# Patient Record
Sex: FEMALE | Race: WHITE | NOT HISPANIC OR LATINO | Employment: OTHER | URBAN - NONMETROPOLITAN AREA
[De-identification: names, ages, dates, MRNs, and addresses within clinical notes are randomized per-mention and may not be internally consistent; named-entity substitution may affect disease eponyms.]

---

## 2023-03-07 ENCOUNTER — TELEPHONE (OUTPATIENT)
Dept: PRIMARY CARE | Facility: CLINIC | Age: 68
End: 2023-03-07
Payer: MEDICAID

## 2023-03-31 ENCOUNTER — TELEPHONE (OUTPATIENT)
Dept: PRIMARY CARE | Facility: CLINIC | Age: 68
End: 2023-03-31
Payer: MEDICAID

## 2023-03-31 DIAGNOSIS — E66.9 DIABETES MELLITUS TYPE 2 IN OBESE: Primary | ICD-10-CM

## 2023-03-31 DIAGNOSIS — E11.69 DIABETES MELLITUS TYPE 2 IN OBESE: Primary | ICD-10-CM

## 2023-03-31 NOTE — TELEPHONE ENCOUNTER
Pt is asking if you could refer her to a Eye doctor so that she can get a DM eye  exam. Please advise, Thank you.

## 2023-04-18 ENCOUNTER — TELEPHONE (OUTPATIENT)
Dept: PRIMARY CARE | Facility: CLINIC | Age: 68
End: 2023-04-18
Payer: MEDICAID

## 2023-05-02 DIAGNOSIS — G62.89 OTHER POLYNEUROPATHY: Primary | ICD-10-CM

## 2023-05-02 PROBLEM — J30.2 SEASONAL ALLERGIES: Status: ACTIVE | Noted: 2023-05-02

## 2023-05-02 PROBLEM — K21.9 GERD (GASTROESOPHAGEAL REFLUX DISEASE): Status: ACTIVE | Noted: 2023-05-02

## 2023-05-02 PROBLEM — G62.9 PERIPHERAL NEUROPATHY: Status: ACTIVE | Noted: 2023-05-02

## 2023-05-02 PROBLEM — E78.5 HYPERLIPIDEMIA LDL GOAL <70: Status: ACTIVE | Noted: 2023-05-02

## 2023-05-02 PROBLEM — N39.3 STRESS INCONTINENCE IN FEMALE: Status: ACTIVE | Noted: 2023-05-02

## 2023-05-02 PROBLEM — G25.81 RESTLESS LEG: Status: ACTIVE | Noted: 2023-05-02

## 2023-05-02 PROBLEM — G47.00 INSOMNIA: Status: ACTIVE | Noted: 2023-05-02

## 2023-05-02 PROBLEM — E53.8 VITAMIN B12 DEFICIENCY: Status: ACTIVE | Noted: 2023-05-02

## 2023-05-02 PROBLEM — F01.50 VASCULAR DEMENTIA, UNCOMPLICATED (MULTI): Status: ACTIVE | Noted: 2023-05-02

## 2023-05-02 PROBLEM — E11.9 DIABETES MELLITUS, TYPE 2 (MULTI): Status: ACTIVE | Noted: 2023-05-02

## 2023-05-02 PROBLEM — M13.0 POLYARTHROPATHY: Status: ACTIVE | Noted: 2023-05-02

## 2023-05-02 PROBLEM — K22.81 ESOPHAGEAL POLYP: Status: ACTIVE | Noted: 2023-05-02

## 2023-05-02 PROBLEM — K58.9 IBS (IRRITABLE BOWEL SYNDROME): Status: ACTIVE | Noted: 2023-05-02

## 2023-05-02 PROBLEM — F32.0 DEPRESSION, MAJOR, SINGLE EPISODE, MILD (CMS-HCC): Status: ACTIVE | Noted: 2023-05-02

## 2023-05-02 PROBLEM — I25.10 CAD (CORONARY ARTERY DISEASE): Status: ACTIVE | Noted: 2023-05-02

## 2023-05-02 RX ORDER — GABAPENTIN 300 MG/1
300 CAPSULE ORAL 3 TIMES DAILY
Qty: 90 CAPSULE | Refills: 5 | Status: SHIPPED | OUTPATIENT
Start: 2023-05-02 | End: 2023-09-15 | Stop reason: SDUPTHER

## 2023-05-02 RX ORDER — GABAPENTIN 300 MG/1
300 CAPSULE ORAL 3 TIMES DAILY
COMMUNITY
Start: 2023-03-16 | End: 2023-05-02 | Stop reason: SDUPTHER

## 2023-06-05 ENCOUNTER — OFFICE VISIT (OUTPATIENT)
Dept: PRIMARY CARE | Facility: CLINIC | Age: 68
End: 2023-06-05
Payer: MEDICARE

## 2023-06-05 VITALS
BODY MASS INDEX: 35.82 KG/M2 | DIASTOLIC BLOOD PRESSURE: 102 MMHG | HEART RATE: 70 BPM | OXYGEN SATURATION: 98 % | HEIGHT: 65 IN | SYSTOLIC BLOOD PRESSURE: 182 MMHG | WEIGHT: 215 LBS

## 2023-06-05 DIAGNOSIS — E66.01 OBESITY, MORBID (MULTI): Primary | ICD-10-CM

## 2023-06-05 DIAGNOSIS — E11.9 TYPE 2 DIABETES MELLITUS WITHOUT COMPLICATION, WITHOUT LONG-TERM CURRENT USE OF INSULIN (MULTI): ICD-10-CM

## 2023-06-05 DIAGNOSIS — K58.0 IRRITABLE BOWEL SYNDROME WITH DIARRHEA: ICD-10-CM

## 2023-06-05 DIAGNOSIS — I10 PRIMARY HYPERTENSION: ICD-10-CM

## 2023-06-05 DIAGNOSIS — F01.50 VASCULAR DEMENTIA, UNCOMPLICATED (MULTI): ICD-10-CM

## 2023-06-05 DIAGNOSIS — E78.5 HYPERLIPIDEMIA LDL GOAL <70: ICD-10-CM

## 2023-06-05 DIAGNOSIS — R53.83 FATIGUE, UNSPECIFIED TYPE: ICD-10-CM

## 2023-06-05 DIAGNOSIS — F32.0 DEPRESSION, MAJOR, SINGLE EPISODE, MILD (CMS-HCC): ICD-10-CM

## 2023-06-05 PROBLEM — N32.81 OVERACTIVE BLADDER: Status: ACTIVE | Noted: 2022-02-16

## 2023-06-05 PROBLEM — Z85.3 HISTORY OF BREAST CANCER: Status: ACTIVE | Noted: 2022-02-16

## 2023-06-05 PROCEDURE — 1159F MED LIST DOCD IN RCRD: CPT | Performed by: INTERNAL MEDICINE

## 2023-06-05 PROCEDURE — 4010F ACE/ARB THERAPY RXD/TAKEN: CPT | Performed by: INTERNAL MEDICINE

## 2023-06-05 PROCEDURE — 99214 OFFICE O/P EST MOD 30 MIN: CPT | Performed by: INTERNAL MEDICINE

## 2023-06-05 PROCEDURE — 3080F DIAST BP >= 90 MM HG: CPT | Performed by: INTERNAL MEDICINE

## 2023-06-05 PROCEDURE — 3077F SYST BP >= 140 MM HG: CPT | Performed by: INTERNAL MEDICINE

## 2023-06-05 PROCEDURE — 3051F HG A1C>EQUAL 7.0%<8.0%: CPT | Performed by: INTERNAL MEDICINE

## 2023-06-05 RX ORDER — LANOLIN ALCOHOL/MO/W.PET/CERES
1000 CREAM (GRAM) TOPICAL
COMMUNITY
Start: 2022-02-16 | End: 2023-08-07

## 2023-06-05 RX ORDER — HYDROXYZINE PAMOATE 25 MG/1
25 CAPSULE ORAL 3 TIMES DAILY PRN
COMMUNITY
Start: 2022-02-15 | End: 2023-11-08 | Stop reason: SDUPTHER

## 2023-06-05 RX ORDER — FLUTICASONE PROPIONATE 50 MCG
2 SPRAY, SUSPENSION (ML) NASAL DAILY
COMMUNITY
Start: 2022-01-06

## 2023-06-05 RX ORDER — LOPERAMIDE HYDROCHLORIDE 2 MG/1
CAPSULE ORAL
COMMUNITY

## 2023-06-05 RX ORDER — ESOMEPRAZOLE MAGNESIUM 40 MG/1
40 CAPSULE, DELAYED RELEASE ORAL
COMMUNITY
Start: 2021-11-12 | End: 2023-09-15 | Stop reason: SDUPTHER

## 2023-06-05 RX ORDER — LOSARTAN POTASSIUM 50 MG/1
50 TABLET ORAL DAILY
Qty: 30 TABLET | Refills: 11 | Status: SHIPPED | OUTPATIENT
Start: 2023-06-05 | End: 2023-09-15 | Stop reason: SDUPTHER

## 2023-06-05 RX ORDER — ACETAMINOPHEN 500 MG
500 TABLET ORAL EVERY 6 HOURS PRN
COMMUNITY

## 2023-06-05 RX ORDER — TRAZODONE HYDROCHLORIDE 50 MG/1
50 TABLET ORAL NIGHTLY
COMMUNITY
Start: 2021-11-11 | End: 2023-08-07

## 2023-06-05 RX ORDER — NITROGLYCERIN 0.4 MG/1
0.4 TABLET SUBLINGUAL EVERY 5 MIN PRN
COMMUNITY
End: 2023-11-22 | Stop reason: SDUPTHER

## 2023-06-05 RX ORDER — ROSUVASTATIN CALCIUM 5 MG/1
10 TABLET, COATED ORAL DAILY
COMMUNITY
Start: 2020-09-11 | End: 2023-08-07

## 2023-06-05 RX ORDER — DESVENLAFAXINE 100 MG/1
100 TABLET, EXTENDED RELEASE ORAL DAILY
COMMUNITY
Start: 2022-04-20 | End: 2023-08-07

## 2023-06-05 RX ORDER — METFORMIN HYDROCHLORIDE 500 MG/1
2 TABLET ORAL 2 TIMES DAILY
COMMUNITY
Start: 2022-03-07 | End: 2023-06-05 | Stop reason: SINTOL

## 2023-06-05 RX ORDER — GLIPIZIDE 2.5 MG/1
2.5 TABLET, EXTENDED RELEASE ORAL DAILY
COMMUNITY
Start: 2023-02-20 | End: 2023-06-26

## 2023-06-05 RX ORDER — METOPROLOL TARTRATE 100 MG/1
100 TABLET ORAL 2 TIMES DAILY
Qty: 60 TABLET | Refills: 5 | Status: SHIPPED | OUTPATIENT
Start: 2023-06-05 | End: 2023-09-15 | Stop reason: SDUPTHER

## 2023-06-05 RX ORDER — NAPROXEN SODIUM 220 MG/1
81 TABLET, FILM COATED ORAL
COMMUNITY

## 2023-06-05 RX ORDER — BUPROPION HYDROCHLORIDE 300 MG/1
300 TABLET ORAL EVERY MORNING
COMMUNITY
Start: 2022-04-20 | End: 2023-08-07

## 2023-06-05 ASSESSMENT — ENCOUNTER SYMPTOMS
PALPITATIONS: 0
ABDOMINAL PAIN: 0
SHORTNESS OF BREATH: 0
ARTHRALGIAS: 0
CHEST TIGHTNESS: 0
NAUSEA: 0
BACK PAIN: 0
WHEEZING: 0
DIARRHEA: 0
VOMITING: 0
COUGH: 0
FATIGUE: 1
BLOOD IN STOOL: 0

## 2023-06-05 NOTE — PATIENT INSTRUCTIONS
Please continue taking your metoprolol twice daily and please also remember to bring all of your pill bottles in a bag so that we can reconcile your medication list  Your blood pressure may have been elevated today due to coming into the doctor's office so I would like for you to check your blood pressure at home with your personal monitor when you have the opportunity to sit and relax for 10 to 20 minutes.  Please do this several times before your next follow-up visit and please bring your monitor with you to the visit  I sent a prescription to your pharmacy for medication called losartan 50 mg to be taken once daily.  This is also a blood pressure medicine that you can take every morning with your first dose of metoprolol  I really suggest that you set up a pill minder where you put all the pills in boxes at the beginning of every week and that way you do not have to worry about whether you have taken or not  I am also asking that you go get lab work so that we can assess your diabetes, your kidney function, your cholesterol, and also take a look at your thyroid and blood count.  This lab work should be fasting and please try to get it done at your earliest convenience

## 2023-06-05 NOTE — ASSESSMENT & PLAN NOTE
-I have asked that she fill her prescription for her testing kit supplies and start checking her sugar twice daily before breakfast and supper so that when she comes back we can evaluate  -She is currently taking Glucotrol XL 2.5 mg daily

## 2023-06-05 NOTE — PROGRESS NOTES
Subjective   Patient ID: Silva Corrigan is a 67 y.o. female who presents for No chief complaint on file..  HPI  She is here today for evaluation and accompanied by her son Jamie.  When she arrived her blood pressure was quite a bit generous.  We spent some time discussing her medications and the importance of taking them as directed and on time.  She is very adamant that she has a system for which she remembers to take it every day.  We talked about simply monitoring to make sure everything is on track.  We talked about her diagnosis of vascular dementia and how sometimes it can lead to forgetting certain important things like taking medicines and so forth.  Her son is noticed several changes in the past several years.  We discussed her irritable bowel syndrome and last visit she had stopped the metformin and felt like it was helping with her symptoms.  She states that she has not picked up her diabetic supplies yet but she will start checking her sugars to see what is going on with the numbers.  We talked about doing some laboratory testing.  I am going to add a blood pressure medication to her regimen today and then her son is going to give her a personal blood pressure monitor to perform at home.  We will see her back to go over the numbers and also reevaluate her blood pressure and how everything is going.  I will summarize everything in a problem based format  Review of Systems   Constitutional:  Positive for fatigue.   Respiratory:  Negative for cough, chest tightness, shortness of breath and wheezing.    Cardiovascular:  Negative for chest pain, palpitations and leg swelling.   Gastrointestinal:  Negative for abdominal pain, blood in stool, diarrhea, nausea and vomiting.   Musculoskeletal:  Negative for arthralgias and back pain.     Objective   Physical Exam  Vitals and nursing note reviewed. Exam conducted with a chaperone present (Angers easily).   Constitutional:       General: She is not in acute  distress.     Appearance: Normal appearance.   HENT:      Head: Normocephalic and atraumatic.   Eyes:      Conjunctiva/sclera: Conjunctivae normal.   Cardiovascular:      Rate and Rhythm: Normal rate and regular rhythm.      Heart sounds: Normal heart sounds.   Pulmonary:      Effort: No respiratory distress.      Breath sounds: No wheezing.   Abdominal:      Palpations: Abdomen is soft.      Tenderness: There is no abdominal tenderness. There is no guarding.   Musculoskeletal:         General: No swelling. Normal range of motion.   Skin:     General: Skin is warm and dry.   Neurological:      General: No focal deficit present.      Mental Status: She is alert and oriented to person, place, and time.   Psychiatric:         Behavior: Behavior normal.         Assessment/Plan   Problem List Items Addressed This Visit          Nervous    Vascular dementia, uncomplicated (CMS/HCC)     -We discussed some of the issues with memory loss            Circulatory    Primary hypertension     -She reports taking her metoprolol tartrate 100 mg twice daily  -I am asking that she bring all of her pill bottles in a bag when she comes for her follow-up visit so we can make sure that what we have listed here is what she is taking at home  -I have requested that she let her son at least monitor her intake to make sure we are on track  -I am adding losartan 50 mg once daily to her blood pressure regimen and she will check her blood pressure at home with her personal monitor and bring it back with her         Relevant Medications    metoprolol tartrate (Lopressor) 100 mg tablet    losartan (Cozaar) 50 mg tablet       Digestive    IBS (irritable bowel syndrome)     -She discontinued the metformin which helped with her bowel symptoms            Endocrine/Metabolic    Diabetes mellitus, type 2 (CMS/HCC)     -I have asked that she fill her prescription for her testing kit supplies and start checking her sugar twice daily before breakfast and  supper so that when she comes back we can evaluate  -She is currently taking Glucotrol XL 2.5 mg daily         Obesity, morbid (CMS/HCC) - Primary       Other    Depression, major, single episode, mild (CMS/HCC)    Hyperlipidemia LDL goal <70     -We will be checking her cholesterol with her set of labs coming up in the next few weeks         Fatigue     -I will order a TSH and CBC as part of her evaluation for chronic fatigue               Alicia Mendoza, DO

## 2023-06-05 NOTE — ASSESSMENT & PLAN NOTE
-She reports taking her metoprolol tartrate 100 mg twice daily  -I am asking that she bring all of her pill bottles in a bag when she comes for her follow-up visit so we can make sure that what we have listed here is what she is taking at home  -I have requested that she let her son at least monitor her intake to make sure we are on track  -I am adding losartan 50 mg once daily to her blood pressure regimen and she will check her blood pressure at home with her personal monitor and bring it back with her

## 2023-06-09 ENCOUNTER — LAB (OUTPATIENT)
Dept: LAB | Facility: LAB | Age: 68
End: 2023-06-09
Payer: MEDICARE

## 2023-06-09 DIAGNOSIS — R53.83 FATIGUE, UNSPECIFIED TYPE: ICD-10-CM

## 2023-06-09 DIAGNOSIS — E11.9 TYPE 2 DIABETES MELLITUS WITHOUT COMPLICATION, WITHOUT LONG-TERM CURRENT USE OF INSULIN (MULTI): ICD-10-CM

## 2023-06-09 DIAGNOSIS — E78.5 HYPERLIPIDEMIA LDL GOAL <70: ICD-10-CM

## 2023-06-09 LAB
ANION GAP IN SER/PLAS: 14 MMOL/L (ref 10–20)
BASOPHILS (10*3/UL) IN BLOOD BY AUTOMATED COUNT: 0.07 X10E9/L (ref 0–0.1)
BASOPHILS/100 LEUKOCYTES IN BLOOD BY AUTOMATED COUNT: 0.9 % (ref 0–2)
CALCIUM (MG/DL) IN SER/PLAS: 9.6 MG/DL (ref 8.6–10.3)
CARBON DIOXIDE, TOTAL (MMOL/L) IN SER/PLAS: 27 MMOL/L (ref 21–32)
CHLORIDE (MMOL/L) IN SER/PLAS: 104 MMOL/L (ref 98–107)
CHOLESTEROL (MG/DL) IN SER/PLAS: 188 MG/DL (ref 0–199)
CHOLESTEROL IN HDL (MG/DL) IN SER/PLAS: 41 MG/DL
CHOLESTEROL/HDL RATIO: 4.6
CREATININE (MG/DL) IN SER/PLAS: 0.92 MG/DL (ref 0.5–1.05)
EOSINOPHILS (10*3/UL) IN BLOOD BY AUTOMATED COUNT: 0.1 X10E9/L (ref 0–0.7)
EOSINOPHILS/100 LEUKOCYTES IN BLOOD BY AUTOMATED COUNT: 1.3 % (ref 0–6)
ERYTHROCYTE DISTRIBUTION WIDTH (RATIO) BY AUTOMATED COUNT: 13.7 % (ref 11.5–14.5)
ERYTHROCYTE MEAN CORPUSCULAR HEMOGLOBIN CONCENTRATION (G/DL) BY AUTOMATED: 32 G/DL (ref 32–36)
ERYTHROCYTE MEAN CORPUSCULAR VOLUME (FL) BY AUTOMATED COUNT: 91 FL (ref 80–100)
ERYTHROCYTES (10*6/UL) IN BLOOD BY AUTOMATED COUNT: 5.02 X10E12/L (ref 4–5.2)
ESTIMATED AVERAGE GLUCOSE FOR HBA1C: 186 MG/DL
GFR FEMALE: 68 ML/MIN/1.73M2
GLUCOSE (MG/DL) IN SER/PLAS: 154 MG/DL (ref 74–99)
HEMATOCRIT (%) IN BLOOD BY AUTOMATED COUNT: 45.9 % (ref 36–46)
HEMOGLOBIN (G/DL) IN BLOOD: 14.7 G/DL (ref 12–16)
HEMOGLOBIN A1C/HEMOGLOBIN TOTAL IN BLOOD: 8.1 %
IMMATURE GRANULOCYTES/100 LEUKOCYTES IN BLOOD BY AUTOMATED COUNT: 0.3 % (ref 0–0.9)
LDL: 95 MG/DL (ref 0–99)
LEUKOCYTES (10*3/UL) IN BLOOD BY AUTOMATED COUNT: 7.8 X10E9/L (ref 4.4–11.3)
LYMPHOCYTES (10*3/UL) IN BLOOD BY AUTOMATED COUNT: 2.68 X10E9/L (ref 1.2–4.8)
LYMPHOCYTES/100 LEUKOCYTES IN BLOOD BY AUTOMATED COUNT: 34.2 % (ref 13–44)
MONOCYTES (10*3/UL) IN BLOOD BY AUTOMATED COUNT: 0.57 X10E9/L (ref 0.1–1)
MONOCYTES/100 LEUKOCYTES IN BLOOD BY AUTOMATED COUNT: 7.3 % (ref 2–10)
NEUTROPHILS (10*3/UL) IN BLOOD BY AUTOMATED COUNT: 4.4 X10E9/L (ref 1.2–7.7)
NEUTROPHILS/100 LEUKOCYTES IN BLOOD BY AUTOMATED COUNT: 56 % (ref 40–80)
NON HDL CHOLESTEROL: 147 MG/DL
PLATELETS (10*3/UL) IN BLOOD AUTOMATED COUNT: 285 X10E9/L (ref 150–450)
POTASSIUM (MMOL/L) IN SER/PLAS: 4.7 MMOL/L (ref 3.5–5.3)
SODIUM (MMOL/L) IN SER/PLAS: 140 MMOL/L (ref 136–145)
THYROTROPIN (MIU/L) IN SER/PLAS BY DETECTION LIMIT <= 0.05 MIU/L: 2.71 MIU/L (ref 0.44–3.98)
TRIGLYCERIDE (MG/DL) IN SER/PLAS: 261 MG/DL (ref 0–149)
UREA NITROGEN (MG/DL) IN SER/PLAS: 16 MG/DL (ref 6–23)
VLDL: 52 MG/DL (ref 0–40)

## 2023-06-09 PROCEDURE — 83036 HEMOGLOBIN GLYCOSYLATED A1C: CPT

## 2023-06-09 PROCEDURE — 80048 BASIC METABOLIC PNL TOTAL CA: CPT

## 2023-06-09 PROCEDURE — 85025 COMPLETE CBC W/AUTO DIFF WBC: CPT

## 2023-06-09 PROCEDURE — 80061 LIPID PANEL: CPT

## 2023-06-09 PROCEDURE — 36415 COLL VENOUS BLD VENIPUNCTURE: CPT

## 2023-06-09 PROCEDURE — 84443 ASSAY THYROID STIM HORMONE: CPT

## 2023-06-26 ENCOUNTER — OFFICE VISIT (OUTPATIENT)
Dept: PRIMARY CARE | Facility: CLINIC | Age: 68
End: 2023-06-26
Payer: MEDICARE

## 2023-06-26 VITALS
BODY MASS INDEX: 35.99 KG/M2 | WEIGHT: 216 LBS | DIASTOLIC BLOOD PRESSURE: 80 MMHG | SYSTOLIC BLOOD PRESSURE: 132 MMHG | HEART RATE: 77 BPM | HEIGHT: 65 IN

## 2023-06-26 DIAGNOSIS — E78.5 HYPERLIPIDEMIA LDL GOAL <70: ICD-10-CM

## 2023-06-26 DIAGNOSIS — L29.9 PRURITUS: ICD-10-CM

## 2023-06-26 DIAGNOSIS — E11.9 TYPE 2 DIABETES MELLITUS WITHOUT COMPLICATION, WITHOUT LONG-TERM CURRENT USE OF INSULIN (MULTI): Primary | ICD-10-CM

## 2023-06-26 PROCEDURE — 3075F SYST BP GE 130 - 139MM HG: CPT | Performed by: INTERNAL MEDICINE

## 2023-06-26 PROCEDURE — 4010F ACE/ARB THERAPY RXD/TAKEN: CPT | Performed by: INTERNAL MEDICINE

## 2023-06-26 PROCEDURE — 3079F DIAST BP 80-89 MM HG: CPT | Performed by: INTERNAL MEDICINE

## 2023-06-26 PROCEDURE — 99214 OFFICE O/P EST MOD 30 MIN: CPT | Performed by: INTERNAL MEDICINE

## 2023-06-26 PROCEDURE — 3052F HG A1C>EQUAL 8.0%<EQUAL 9.0%: CPT | Performed by: INTERNAL MEDICINE

## 2023-06-26 PROCEDURE — 1160F RVW MEDS BY RX/DR IN RCRD: CPT | Performed by: INTERNAL MEDICINE

## 2023-06-26 PROCEDURE — 1159F MED LIST DOCD IN RCRD: CPT | Performed by: INTERNAL MEDICINE

## 2023-06-26 RX ORDER — GLIPIZIDE 5 MG/1
5 TABLET, FILM COATED, EXTENDED RELEASE ORAL DAILY
Qty: 30 TABLET | Refills: 5 | Status: SHIPPED | OUTPATIENT
Start: 2023-06-26 | End: 2023-09-15 | Stop reason: SDUPTHER

## 2023-06-26 NOTE — ASSESSMENT & PLAN NOTE
-We discussed her hemoglobin A1c of 8.1  -We are increasing her glipizide XL from 2.5 mg up to 5 mg daily.  -She will monitor her sugars closely and give us an update and we will see her back in 3 months for reevaluation

## 2023-06-26 NOTE — PROGRESS NOTES
Subjective   Patient ID: Silva Corrigan is a 67 y.o. female who presents for No chief complaint on file..  HPI  She is here today for her 2-week follow-up.  She comes alone.  We went over the issues that we had identified at her last visit and we did specifically talk about her memory loss.  I had requested that she bring in all of her medications in a bag but she forgot.  We also discussed how keeping track of the medications is very important and either omission or may be double dosing can be dangerous.  Regardless her blood pressure looks okay today.  We also went over the results of all of her lab work.  Overall most of her numbers looked great.  Her kidney function is normal and her CBC was also completely normal.  We discussed her cholesterol and the only problem and really seeing her the high triglycerides.  Also unfortunately her blood sugars of also been higher than desirable as evidenced by hemoglobin A1c of 8.1.  This is up from 7 previously.  We discussed how high sugars and high triglycerides often go hand-in-hand so if we can lower the sugars usually the triglycerides also improved.  I am going to increase her glipizide from 2.5 mg up to 5 mg daily and I just asked that she watch sugars closely and give us an update.  We also discussed canceling her August appointment and then recalibrating a follow-up visit 3 months from now.  I will also dictate a list of tasks that we will want to accomplish just prior to her next follow-up visit.  Towards the end of her visit she had mentioned that she was having an itching scalp in place to the crown of her head.  I do not see any lesions per se but it does look like she has little bit of seborrhea so I recommended that she try over-the-counter Selsun Blue twice a week and to call if it is not successful in eradicating her symptoms.  As we were wrapping up she went on to state that she has been itching and she just started scratching.  She does not have a visible  rash.  We talked about how sometimes this can be a sign a dry skin or maybe even a reaction to certain personal hygienic products.  I will have her try a hypoallergenic regimen and if this is not successful in eradicating her itching she will get back with me.  Review of Systems  Objective   Physical Exam  Vitals and nursing note reviewed.   Constitutional:       General: She is not in acute distress.     Appearance: Normal appearance.   HENT:      Head: Normocephalic and atraumatic.   Eyes:      Conjunctiva/sclera: Conjunctivae normal.   Cardiovascular:      Rate and Rhythm: Normal rate and regular rhythm.      Heart sounds: Normal heart sounds.   Pulmonary:      Effort: No respiratory distress.      Breath sounds: No wheezing.   Abdominal:      Palpations: Abdomen is soft.      Tenderness: There is no abdominal tenderness. There is no guarding.   Musculoskeletal:         General: No swelling. Normal range of motion.   Skin:     General: Skin is warm and dry.   Neurological:      General: No focal deficit present.      Mental Status: She is alert and oriented to person, place, and time.   Psychiatric:         Behavior: Behavior normal.       Recent Results (from the past 504 hour(s))   Basic Metabolic Panel    Collection Time: 06/09/23 12:46 PM   Result Value Ref Range    Glucose 154 (H) 74 - 99 mg/dL    Sodium 140 136 - 145 mmol/L    Potassium 4.7 3.5 - 5.3 mmol/L    Chloride 104 98 - 107 mmol/L    Bicarbonate 27 21 - 32 mmol/L    Anion Gap 14 10 - 20 mmol/L    Urea Nitrogen 16 6 - 23 mg/dL    Creatinine 0.92 0.50 - 1.05 mg/dL    GFR Female 68 >90 mL/min/1.73m2    Calcium 9.6 8.6 - 10.3 mg/dL   Hemoglobin A1C    Collection Time: 06/09/23 12:46 PM   Result Value Ref Range    Hemoglobin A1C 8.1 (A) %    Estimated Average Glucose 186 MG/DL   TSH with reflex to Free T4 if abnormal    Collection Time: 06/09/23 12:46 PM   Result Value Ref Range    TSH 2.71 0.44 - 3.98 mIU/L   CBC and Auto Differential    Collection  Time: 06/09/23 12:46 PM   Result Value Ref Range    WBC 7.8 4.4 - 11.3 x10E9/L    RBC 5.02 4.00 - 5.20 x10E12/L    Hemoglobin 14.7 12.0 - 16.0 g/dL    Hematocrit 45.9 36.0 - 46.0 %    MCV 91 80 - 100 fL    MCHC 32.0 32.0 - 36.0 g/dL    Platelets 285 150 - 450 x10E9/L    RDW 13.7 11.5 - 14.5 %    Neutrophils % 56.0 40.0 - 80.0 %    Immature Granulocytes %, Automated 0.3 0.0 - 0.9 %    Lymphocytes % 34.2 13.0 - 44.0 %    Monocytes % 7.3 2.0 - 10.0 %    Eosinophils % 1.3 0.0 - 6.0 %    Basophils % 0.9 0.0 - 2.0 %    Neutrophils Absolute 4.40 1.20 - 7.70 x10E9/L    Lymphocytes Absolute 2.68 1.20 - 4.80 x10E9/L    Monocytes Absolute 0.57 0.10 - 1.00 x10E9/L    Eosinophils Absolute 0.10 0.00 - 0.70 x10E9/L    Basophils Absolute 0.07 0.00 - 0.10 x10E9/L   Lipid panel    Collection Time: 06/09/23 12:46 PM   Result Value Ref Range    Cholesterol 188 0 - 199 mg/dL    HDL 41.0 mg/dL    Cholesterol/HDL Ratio 4.6     LDL 95 0 - 99 mg/dL    VLDL 52 (H) 0 - 40 mg/dL    Triglycerides 261 (H) 0 - 149 mg/dL    Non HDL Cholesterol 147 mg/dL       Assessment/Plan   Problem List Items Addressed This Visit       Diabetes mellitus, type 2 (CMS/[facility]) - Primary     -We discussed her hemoglobin A1c of 8.1  -We are increasing her glipizide XL from 2.5 mg up to 5 mg daily.  -She will monitor her sugars closely and give us an update and we will see her back in 3 months for reevaluation         Relevant Medications    glipiZIDE XL (Glucotrol XL) 5 mg 24 hr tablet    Hyperlipidemia LDL goal <70     -We discussed her cholesterol and basically her triglycerides were high at 261.  We discussed how often times high sugars lead to high triglyceride levels and by lowering her sugar we should see improvement in the triglycerides.  I am making no changes in the dose of her cholesterol-lowering medication and she will continue taking rosuvastatin 5 mg daily         Pruritus     -I have outlined a hypoallergenic regimen including the use of Dove sensitive  soap for bathing  -Limiting bathing to shorter duration with lukewarm temperatures  -Dreft laundry detergent with double rinse cycle  -No fabric softeners of any kind  -Aquaphor twice daily for moisturizing                 Alicia S Royal, DO

## 2023-06-26 NOTE — ASSESSMENT & PLAN NOTE
-I have outlined a hypoallergenic regimen including the use of Dove sensitive soap for bathing  -Limiting bathing to shorter duration with lukewarm temperatures  -Dreft laundry detergent with double rinse cycle  -No fabric softeners of any kind  -Aquaphor twice daily for moisturizing

## 2023-06-26 NOTE — ASSESSMENT & PLAN NOTE
-We discussed her cholesterol and basically her triglycerides were high at 261.  We discussed how often times high sugars lead to high triglyceride levels and by lowering her sugar we should see improvement in the triglycerides.  I am making no changes in the dose of her cholesterol-lowering medication and she will continue taking rosuvastatin 5 mg daily

## 2023-06-26 NOTE — PATIENT INSTRUCTIONS
As we discussed your blood glucose levels have been higher than desirable as evidenced by hemoglobin A1c of 8.1.  This is up from a previous value of 7.0.  We decided to increase the dose of your glipizide from 2.5 mg up to 5 mg a day.  You can use up your 2.5 mg tablets by taking 2 at the very same time and then when you run out you will have a new prescription at the pharmacy for the 5 mg dose.  You will take that once a day.  Please check your sugars with this transition of medication and please call if you are having any difficulties.  I anticipate that by lowering your sugars your triglycerides will follow suit so we are not changing your cholesterol medication at this time.  We discussed the itchy scalp that you experience and I am recommending you try over-the-counter Selsun Blue twice a week and if this does not take care of it let us now.  We are going to cancel your August appointment and have you come back 3 months from now.  Please remember to get your lab work done just prior to that visit  I would still like for you to bring all of your pill bottles including over-the-counter medications in a bag for medication reconciliation  We discussed the itching that you have been experiencing and I do not see any evidence of a rash.  I do appreciate however that it looks like you have some dry skin which is very common as we age because her oil glands dry out.  I am going to asked that you do the following skin treatment and if your itching is not resolved to get back with us.  First:  Use Dove sensitive soap for bathing only it comes in a white box with pale green markings  Do not use any lotions as they can have a lot of perfume and ST alcohol in them which can have a drying effect.  For your dry skin I am going to recommend Aquaphor which you can find it almost any retail store and try to apply that twice a day to your skin.  Aquaphor is very neutral and does not have a lot of chemicals in it.  Use Dreft  laundry detergent for your clothing as a lot of people have allergies to laundry detergents.  Try to use to double rinse cycle in your washing machine so that all the soap is rinsed away and do not use any fabric softeners of any kind  Try to limit your bathing because often times hot long showers or baths can really dry out the skin.  You should try to only bathe a couple times a week and then just do a sponge bath in the hot spots  Please let us know if this is not effective in improving your itching  We will try to track down your last colorectal cancer screening test from [facility]

## 2023-07-20 ENCOUNTER — APPOINTMENT (OUTPATIENT)
Dept: PRIMARY CARE | Facility: CLINIC | Age: 68
End: 2023-07-20
Payer: MEDICAID

## 2023-07-28 DIAGNOSIS — F32.0 DEPRESSION, MAJOR, SINGLE EPISODE, MILD (CMS-HCC): ICD-10-CM

## 2023-07-28 DIAGNOSIS — E78.5 HYPERLIPIDEMIA LDL GOAL <70: ICD-10-CM

## 2023-07-28 DIAGNOSIS — G47.00 INSOMNIA, UNSPECIFIED TYPE: ICD-10-CM

## 2023-07-28 DIAGNOSIS — E53.8 VITAMIN B12 DEFICIENCY: ICD-10-CM

## 2023-07-31 ENCOUNTER — OFFICE VISIT (OUTPATIENT)
Dept: PRIMARY CARE | Facility: CLINIC | Age: 68
End: 2023-07-31
Payer: MEDICARE

## 2023-07-31 VITALS
HEIGHT: 65 IN | OXYGEN SATURATION: 98 % | BODY MASS INDEX: 36.49 KG/M2 | WEIGHT: 219 LBS | DIASTOLIC BLOOD PRESSURE: 80 MMHG | HEART RATE: 69 BPM | SYSTOLIC BLOOD PRESSURE: 126 MMHG

## 2023-07-31 DIAGNOSIS — R26.89 BALANCE DISORDER: Primary | ICD-10-CM

## 2023-07-31 PROCEDURE — 3074F SYST BP LT 130 MM HG: CPT | Performed by: INTERNAL MEDICINE

## 2023-07-31 PROCEDURE — 4010F ACE/ARB THERAPY RXD/TAKEN: CPT | Performed by: INTERNAL MEDICINE

## 2023-07-31 PROCEDURE — 99213 OFFICE O/P EST LOW 20 MIN: CPT | Performed by: INTERNAL MEDICINE

## 2023-07-31 PROCEDURE — 3079F DIAST BP 80-89 MM HG: CPT | Performed by: INTERNAL MEDICINE

## 2023-07-31 PROCEDURE — 1159F MED LIST DOCD IN RCRD: CPT | Performed by: INTERNAL MEDICINE

## 2023-07-31 PROCEDURE — 3052F HG A1C>EQUAL 8.0%<EQUAL 9.0%: CPT | Performed by: INTERNAL MEDICINE

## 2023-07-31 PROCEDURE — 1160F RVW MEDS BY RX/DR IN RCRD: CPT | Performed by: INTERNAL MEDICINE

## 2023-07-31 NOTE — PROGRESS NOTES
"Subjective   Patient ID: Silva Corrigan is a 67 y.o. female who presents for No chief complaint on file..  HPI  She made an appointment today to be seen because \"my son has really been on me recently about my falls \".  When asked how many times she has fallen in the last year she does not quite remember but she does state that she has had 2 falls whereby she luckily did not sustain any serious injuries.  She states she is concerned about her balance.  She states she cannot use a cane but she does have a walker in her possession.  I had her do an get up and go test and she was very slow to get up out of the seat that does not have armrests.  I also had her pace the room and she is very slowed and calculated with her walking.  Based on today's exam I do feel it would be worthwhile to try physical therapy to work on balance and strengthening and also to go over instruction about the use of a walker.  I told her to be very careful when she is moving about and hopefully the physical therapy sessions will improve her balance and reduce her risk of falling.  We also discussed possibly having her son come with her to her subsequent visits and she states that he does have Mondays off and could come with her then.  She states she is thinking about bringing him next time.  Review of Systems  Objective   Physical Exam  Vitals and nursing note reviewed.   Constitutional:       General: She is not in acute distress.     Appearance: Normal appearance.   HENT:      Head: Normocephalic and atraumatic.   Eyes:      Conjunctiva/sclera: Conjunctivae normal.   Cardiovascular:      Rate and Rhythm: Normal rate and regular rhythm.      Heart sounds: Normal heart sounds.   Pulmonary:      Effort: No respiratory distress.      Breath sounds: No wheezing.   Abdominal:      Palpations: Abdomen is soft.      Tenderness: There is no abdominal tenderness. There is no guarding.   Musculoskeletal:         General: No swelling. Normal range of " motion.   Skin:     General: Skin is warm and dry.   Neurological:      General: No focal deficit present.      Mental Status: She is alert and oriented to person, place, and time.   Psychiatric:         Behavior: Behavior normal.       Assessment/Plan   Problem List Items Addressed This Visit       Balance disorder - Primary     -We will send her to physical therapy for evaluation of her balance to work on strengthening and gait with a fall reduction  -I have recommended she use a walker at all times until we get things straightened out         Relevant Orders    Referral to Physical Therapy          Alicia Mendoza DO  Patient was identified as a fall risk. Risk prevention instructions provided.Patient was identified as a fall risk. Risk prevention instructions provided.

## 2023-07-31 NOTE — PATIENT INSTRUCTIONS
As we discussed we are going to refer you to physical therapy to help work on your balance and reduce your risk of falls  I would like for you to use your walker at all times to prevent fall and injury  For your future follow-up visits it would be nice to have your son accompany you      Ways to Help Prevent Falls at Home    Quick Tips   ? Ask for help if you need it. Most people want to help!   ? Get up slowly after sitting or laying down   ? Wear a medical alert device or keep cell phone in your pocket   ? Use night lights, especially areas near a bathroom   ? Keep the items you use often within reach on a small stool or end table   ? Use an assistive device such as walker or cane, as directed by provider/physical therapy   ? Use a non-slip mat and grab bars in your bathroom. Look for home health sections for best options     Other Areas to Focus On   ? Exercise and nutrition: Regular exercise or taking a falls prevention class are great ways improve strength and balance. Don’t forget to stay hydrated and bring a snack!   ? Medicine side effects: Some medicines can make you sleepy or dizzy, which could cause a fall. Ask your healthcare provider about the side effects your medicines could cause. Be sure to let them know if you take any vitamins or supplements as well.   ? Tripping hazards: Remove items you could trip on, such as loose mats, rugs, cords, and clutter. Wear closed toe shoes with rubber soles.   ? Health and wellness: Get regular checkups with your healthcare provider, plus routine vision and hearing screenings. Talk with your healthcare provider about:   o Your medicines and the possible side effects - bring them in a bag if that is easier!   o Problems with balance or feeling dizzy   o Ways to promote bone health, such as Vitamin D and calcium supplements   o Questions or concerns about falling     *Ask your healthcare team if you have questions     [facility], 2022         Ways to Help  Prevent Falls at Home    Quick Tips   ? Ask for help if you need it. Most people want to help!   ? Get up slowly after sitting or laying down   ? Wear a medical alert device or keep cell phone in your pocket   ? Use night lights, especially areas near a bathroom   ? Keep the items you use often within reach on a small stool or end table   ? Use an assistive device such as walker or cane, as directed by provider/physical therapy   ? Use a non-slip mat and grab bars in your bathroom. Look for home health sections for best options     Other Areas to Focus On   ? Exercise and nutrition: Regular exercise or taking a falls prevention class are great ways improve strength and balance. Don’t forget to stay hydrated and bring a snack!   ? Medicine side effects: Some medicines can make you sleepy or dizzy, which could cause a fall. Ask your healthcare provider about the side effects your medicines could cause. Be sure to let them know if you take any vitamins or supplements as well.   ? Tripping hazards: Remove items you could trip on, such as loose mats, rugs, cords, and clutter. Wear closed toe shoes with rubber soles.   ? Health and wellness: Get regular checkups with your healthcare provider, plus routine vision and hearing screenings. Talk with your healthcare provider about:   o Your medicines and the possible side effects - bring them in a bag if that is easier!   o Problems with balance or feeling dizzy   o Ways to promote bone health, such as Vitamin D and calcium supplements   o Questions or concerns about falling     *Ask your healthcare team if you have questions     ©[facility], 2022

## 2023-07-31 NOTE — ASSESSMENT & PLAN NOTE
-We will send her to physical therapy for evaluation of her balance to work on strengthening and gait with a fall reduction  -I have recommended she use a walker at all times until we get things straightened out

## 2023-08-07 RX ORDER — TRAZODONE HYDROCHLORIDE 50 MG/1
50 TABLET ORAL NIGHTLY
Qty: 90 TABLET | Refills: 1 | Status: SHIPPED | OUTPATIENT
Start: 2023-08-07

## 2023-08-07 RX ORDER — ROSUVASTATIN CALCIUM 5 MG/1
5 TABLET, COATED ORAL DAILY
Qty: 90 TABLET | Refills: 1 | Status: SHIPPED | OUTPATIENT
Start: 2023-08-07

## 2023-08-07 RX ORDER — DESVENLAFAXINE 100 MG/1
100 TABLET, EXTENDED RELEASE ORAL DAILY
Qty: 90 TABLET | Refills: 0 | Status: SHIPPED | OUTPATIENT
Start: 2023-08-07 | End: 2023-11-22 | Stop reason: SDUPTHER

## 2023-08-07 RX ORDER — LANOLIN ALCOHOL/MO/W.PET/CERES
1000 CREAM (GRAM) TOPICAL DAILY
Qty: 90 TABLET | Refills: 1 | Status: SHIPPED | OUTPATIENT
Start: 2023-08-07

## 2023-08-07 RX ORDER — BUPROPION HYDROCHLORIDE 300 MG/1
300 TABLET ORAL DAILY
Qty: 90 TABLET | Refills: 1 | Status: SHIPPED | OUTPATIENT
Start: 2023-08-07 | End: 2024-02-14

## 2023-09-12 ENCOUNTER — TELEPHONE (OUTPATIENT)
Dept: PRIMARY CARE | Facility: CLINIC | Age: 68
End: 2023-09-12
Payer: MEDICAID

## 2023-09-12 DIAGNOSIS — R30.0 DYSURIA: Primary | ICD-10-CM

## 2023-09-12 DIAGNOSIS — R32 URINARY INCONTINENCE, UNSPECIFIED TYPE: ICD-10-CM

## 2023-09-12 NOTE — TELEPHONE ENCOUNTER
Please schedule an ultrasound of the bladder  I called her because we had not discussed the symptoms at her prior appointment.  She states is only been getting worse in recent times.  She states she is pretty sure she does not have a urinary tract infection but we did discuss checking a urinalysis and a urine culture.  Because of the incontinence I am also ordering a urinary bladder.  I told her I would call her right away if we saw evidence of a urinary tract infection but otherwise she is fine waiting until her follow-up appointment later this month.

## 2023-09-12 NOTE — TELEPHONE ENCOUNTER
Pt called in requesting a medication for incontinence, states that it is getting worse and she is becoming allergic to the pads she has to wear everyday. Please advise, thank you.     [facility] pharmacy.

## 2023-09-15 DIAGNOSIS — K21.9 GASTROESOPHAGEAL REFLUX DISEASE WITHOUT ESOPHAGITIS: Primary | ICD-10-CM

## 2023-09-15 DIAGNOSIS — G62.89 OTHER POLYNEUROPATHY: ICD-10-CM

## 2023-09-15 DIAGNOSIS — I10 PRIMARY HYPERTENSION: ICD-10-CM

## 2023-09-15 DIAGNOSIS — E11.9 TYPE 2 DIABETES MELLITUS WITHOUT COMPLICATION, WITHOUT LONG-TERM CURRENT USE OF INSULIN (MULTI): ICD-10-CM

## 2023-09-15 RX ORDER — GLIPIZIDE 5 MG/1
5 TABLET, FILM COATED, EXTENDED RELEASE ORAL DAILY
Qty: 30 TABLET | Refills: 5 | Status: SHIPPED
Start: 2023-09-15 | End: 2023-09-22

## 2023-09-15 RX ORDER — LOSARTAN POTASSIUM 50 MG/1
50 TABLET ORAL DAILY
Qty: 30 TABLET | Refills: 11 | Status: SHIPPED | OUTPATIENT
Start: 2023-09-15 | End: 2024-09-14

## 2023-09-15 RX ORDER — METOPROLOL TARTRATE 100 MG/1
100 TABLET ORAL 2 TIMES DAILY
Qty: 60 TABLET | Refills: 5 | Status: SHIPPED | OUTPATIENT
Start: 2023-09-15 | End: 2024-03-13

## 2023-09-15 RX ORDER — GABAPENTIN 300 MG/1
300 CAPSULE ORAL 3 TIMES DAILY
Qty: 90 CAPSULE | Refills: 5 | Status: SHIPPED | OUTPATIENT
Start: 2023-09-15

## 2023-09-15 RX ORDER — ESOMEPRAZOLE MAGNESIUM 40 MG/1
40 CAPSULE, DELAYED RELEASE ORAL
Qty: 90 CAPSULE | Refills: 1 | Status: SHIPPED | OUTPATIENT
Start: 2023-09-15

## 2023-09-21 ENCOUNTER — OFFICE VISIT (OUTPATIENT)
Dept: PRIMARY CARE | Facility: CLINIC | Age: 68
End: 2023-09-21
Payer: MEDICARE

## 2023-09-21 ENCOUNTER — LAB (OUTPATIENT)
Dept: LAB | Facility: LAB | Age: 68
End: 2023-09-21
Payer: COMMERCIAL

## 2023-09-21 VITALS
HEIGHT: 65 IN | HEART RATE: 72 BPM | DIASTOLIC BLOOD PRESSURE: 82 MMHG | BODY MASS INDEX: 36.49 KG/M2 | SYSTOLIC BLOOD PRESSURE: 130 MMHG | WEIGHT: 219 LBS

## 2023-09-21 DIAGNOSIS — R26.89 BALANCE DISORDER: Primary | ICD-10-CM

## 2023-09-21 DIAGNOSIS — E11.9 TYPE 2 DIABETES MELLITUS WITHOUT COMPLICATION, WITHOUT LONG-TERM CURRENT USE OF INSULIN (MULTI): ICD-10-CM

## 2023-09-21 DIAGNOSIS — F01.50 VASCULAR DEMENTIA, UNCOMPLICATED (MULTI): ICD-10-CM

## 2023-09-21 DIAGNOSIS — E78.5 HYPERLIPIDEMIA LDL GOAL <70: ICD-10-CM

## 2023-09-21 DIAGNOSIS — Z00.00 HEALTH CARE MAINTENANCE: ICD-10-CM

## 2023-09-21 DIAGNOSIS — F32.0 DEPRESSION, MAJOR, SINGLE EPISODE, MILD (CMS-HCC): ICD-10-CM

## 2023-09-21 DIAGNOSIS — G47.09 OTHER INSOMNIA: ICD-10-CM

## 2023-09-21 PROBLEM — R30.0 DYSURIA: Status: RESOLVED | Noted: 2023-09-12 | Resolved: 2023-09-21

## 2023-09-21 PROBLEM — L29.9 PRURITUS: Status: RESOLVED | Noted: 2023-06-26 | Resolved: 2023-09-21

## 2023-09-21 PROBLEM — N39.3 STRESS INCONTINENCE IN FEMALE: Status: RESOLVED | Noted: 2023-05-02 | Resolved: 2023-09-21

## 2023-09-21 LAB
ESTIMATED AVERAGE GLUCOSE FOR HBA1C: 209 MG/DL
HEMOGLOBIN A1C/HEMOGLOBIN TOTAL IN BLOOD: 8.9 %

## 2023-09-21 PROCEDURE — 3075F SYST BP GE 130 - 139MM HG: CPT | Performed by: INTERNAL MEDICINE

## 2023-09-21 PROCEDURE — 4010F ACE/ARB THERAPY RXD/TAKEN: CPT | Performed by: INTERNAL MEDICINE

## 2023-09-21 PROCEDURE — 90471 IMMUNIZATION ADMIN: CPT | Performed by: INTERNAL MEDICINE

## 2023-09-21 PROCEDURE — 3079F DIAST BP 80-89 MM HG: CPT | Performed by: INTERNAL MEDICINE

## 2023-09-21 PROCEDURE — 99214 OFFICE O/P EST MOD 30 MIN: CPT | Performed by: INTERNAL MEDICINE

## 2023-09-21 PROCEDURE — 83036 HEMOGLOBIN GLYCOSYLATED A1C: CPT

## 2023-09-21 PROCEDURE — 1160F RVW MEDS BY RX/DR IN RCRD: CPT | Performed by: INTERNAL MEDICINE

## 2023-09-21 PROCEDURE — 1159F MED LIST DOCD IN RCRD: CPT | Performed by: INTERNAL MEDICINE

## 2023-09-21 PROCEDURE — 3052F HG A1C>EQUAL 8.0%<EQUAL 9.0%: CPT | Performed by: INTERNAL MEDICINE

## 2023-09-21 PROCEDURE — 90662 IIV NO PRSV INCREASED AG IM: CPT | Performed by: INTERNAL MEDICINE

## 2023-09-21 PROCEDURE — 36415 COLL VENOUS BLD VENIPUNCTURE: CPT

## 2023-09-21 ASSESSMENT — ENCOUNTER SYMPTOMS
ABDOMINAL PAIN: 0
PALPITATIONS: 0
BLOOD IN STOOL: 0
ARTHRALGIAS: 0
NAUSEA: 0
FATIGUE: 0
COUGH: 0
BACK PAIN: 0
WHEEZING: 0
SHORTNESS OF BREATH: 0
VOMITING: 0
DIARRHEA: 0

## 2023-09-21 NOTE — PATIENT INSTRUCTIONS
Today before leaving you will have your hemoglobin A1c drawn and once the results are known we will contact you.  If your hemoglobin A1c is high we will make adjustments in medications and see you back in mid December for follow-up.  We would like for you to check your sugars regularly as they have been high in the past  We are also giving you the season's flu vaccine for individuals over the age of 65  You are up-to-date with your mammogram and will be due again in February of next year  It sounds like you had colorectal cancer screening by sending in the specimen a few days ago and please call us when you get your report card so that we can update your file  Please continue with physical therapy and please call if you are not doing well

## 2023-09-21 NOTE — PROGRESS NOTES
Subjective   Patient ID: Silva Corrigan is a 68 y.o. female who presents for 3 MONTH FOLLOW UP.  HPI  She is here today for follow-up and accompanied by her son.  We discussed how we do not like to have her family attend her appointments with her because of the issues with memory.  She states she has attended 1 session of physical therapy and will go back tomorrow.  I told her I was very pleased because we want to do everything within her power to keep her from falling in the future.  I told her to be sure and complete her full sessions and do everything that they ask her to do.  We even talked about possibly using a cane or walker if she is not able to maintain steadiness on her own.  We also had intended for her to get lab work done prior to this visit but she forgot.  We are going to check a hemoglobin A1c and I will contact her with results.  We will decide then when she should be seen back for follow-up.  Her last hemoglobin A1c was little high in the 8 range and she states she has not been checking her blood sugars.  We also talked about insomnia and her sleep schedule.  We discussed how she should try to get back on a more routine type sleeping pattern.  She also states she received a kit in the mail for colorectal cancer screening and she completed about 3 days ago and mailed it.  I told her that when she gets her results to let us know as we would like to update her medical chart.  We also are giving her the season's flu vaccine.  She also had her mammogram back in February and all was well.  We will continue with an annual checkup  Review of Systems   Constitutional:  Negative for fatigue.   Respiratory:  Negative for cough, shortness of breath and wheezing.    Cardiovascular:  Negative for chest pain, palpitations and leg swelling.   Gastrointestinal:  Negative for abdominal pain, blood in stool, diarrhea, nausea and vomiting.   Musculoskeletal:  Negative for arthralgias and back pain.     Objective    Physical Exam  Vitals and nursing note reviewed.   Constitutional:       General: She is not in acute distress.     Appearance: Normal appearance.   HENT:      Head: Normocephalic and atraumatic.   Eyes:      Conjunctiva/sclera: Conjunctivae normal.   Cardiovascular:      Rate and Rhythm: Normal rate and regular rhythm.      Heart sounds: Normal heart sounds.   Pulmonary:      Effort: No respiratory distress.      Breath sounds: No wheezing.   Abdominal:      Palpations: Abdomen is soft.      Tenderness: There is no abdominal tenderness. There is no guarding.   Musculoskeletal:         General: No swelling. Normal range of motion.   Skin:     General: Skin is warm and dry.   Neurological:      General: No focal deficit present.      Mental Status: She is alert and oriented to person, place, and time.   Psychiatric:         Behavior: Behavior normal.       Assessment/Plan   Problem List Items Addressed This Visit       Depression, major, single episode, mild (CMS/HCC)    Diabetes mellitus, type 2 (CMS/HCC)     -She will get a hemoglobin A1c drawn today before leaving and I have agreed to contact her with results  -If the hemoglobin A1c is unacceptably high we will make adjustments in medications and see her back in mid December  -If it is normal or within acceptable range we will see her back in 6 months  -She will continue taking glipizide XL 5 mg daily         Relevant Orders    Hemoglobin A1C    Hyperlipidemia LDL goal <70    Insomnia     -I have suggested that she try to get back on a more routine sleep schedule         Vascular dementia, uncomplicated (CMS/HCC)     -She will continue to come with her son or other family members to visits         Balance disorder - Primary     -She will continue with her sessions of physical therapy and call if they are not effective  -We discussed using an assistive device such as a cane or walker               Alicia Mendoza DO

## 2023-09-21 NOTE — ASSESSMENT & PLAN NOTE
-She will continue with her sessions of physical therapy and call if they are not effective  -We discussed using an assistive device such as a cane or walker

## 2023-09-21 NOTE — ASSESSMENT & PLAN NOTE
-She will get a hemoglobin A1c drawn today before leaving and I have agreed to contact her with results  -If the hemoglobin A1c is unacceptably high we will make adjustments in medications and see her back in mid December  -If it is normal or within acceptable range we will see her back in 6 months  -She will continue taking glipizide XL 5 mg daily

## 2023-09-22 DIAGNOSIS — E11.9 TYPE 2 DIABETES MELLITUS WITHOUT COMPLICATION, WITHOUT LONG-TERM CURRENT USE OF INSULIN (MULTI): Primary | ICD-10-CM

## 2023-09-22 RX ORDER — GLIPIZIDE 10 MG/1
10 TABLET, FILM COATED, EXTENDED RELEASE ORAL DAILY
Qty: 30 TABLET | Refills: 11 | Status: SHIPPED | OUTPATIENT
Start: 2023-09-22 | End: 2024-09-21

## 2023-09-22 NOTE — RESULT ENCOUNTER NOTE
Please call Gladys and let her know that her hemoglobin A1c came back high at 8.9 meaning that we need to make an adjustment in her medication.  We have down that she is on the glipizide XL 5 mg daily.  ( She does have some issues with memory) so please ask her to confirm that she is taking the current medication.  You might just have her grab the bottle and read it off.  Tell her that I would like to increase the dose to 10 mg daily and if she is okay with that I will send a new prescription.  Thank you

## 2023-09-29 ENCOUNTER — TELEPHONE (OUTPATIENT)
Dept: PRIMARY CARE | Facility: CLINIC | Age: 68
End: 2023-09-29
Payer: MEDICAID

## 2023-09-29 NOTE — TELEPHONE ENCOUNTER
She does have dementia but acute confusion could be a sign of a medical problem.  For example some people can have a urinary tract infection which can cause confusion.  I suppose since she went back to her baseline then she likely does not have 1 but please advise that if she gets like that again she would need to be evaluated in over the weekend it might include a visit to the emergency department.  Thank you

## 2023-09-29 NOTE — TELEPHONE ENCOUNTER
"Pt son abdi called in stating that yesterday his mom was hallucinating for quite sometime during the day, then just went back to \"normal\". Thinking people are in her attic, she said that she told him she could \"smell\" them up there. He just wanted you to be aware, I did offer appointment.    "

## 2023-10-05 ENCOUNTER — APPOINTMENT (OUTPATIENT)
Dept: PHYSICAL THERAPY | Facility: CLINIC | Age: 68
End: 2023-10-05
Payer: MEDICAID

## 2023-10-06 ENCOUNTER — APPOINTMENT (OUTPATIENT)
Dept: PHYSICAL THERAPY | Facility: CLINIC | Age: 68
End: 2023-10-06
Payer: MEDICARE

## 2023-11-06 DIAGNOSIS — F41.9 ANXIETY: ICD-10-CM

## 2023-11-08 ENCOUNTER — TELEPHONE (OUTPATIENT)
Dept: PRIMARY CARE | Facility: CLINIC | Age: 68
End: 2023-11-08
Payer: MEDICAID

## 2023-11-08 DIAGNOSIS — L29.9 PRURITUS: ICD-10-CM

## 2023-11-08 DIAGNOSIS — E11.9 TYPE 2 DIABETES MELLITUS WITHOUT COMPLICATION, WITHOUT LONG-TERM CURRENT USE OF INSULIN (MULTI): Primary | ICD-10-CM

## 2023-11-08 RX ORDER — HYDROXYZINE PAMOATE 25 MG/1
25 CAPSULE ORAL 3 TIMES DAILY PRN
Qty: 30 CAPSULE | Refills: 3 | Status: SHIPPED | OUTPATIENT
Start: 2023-11-08

## 2023-11-08 RX ORDER — LANCETS
EACH MISCELLANEOUS
Qty: 100 EACH | Refills: 11 | Status: SHIPPED | OUTPATIENT
Start: 2023-11-08

## 2023-11-08 RX ORDER — INSULIN PUMP SYRINGE, 3 ML
EACH MISCELLANEOUS
Qty: 1 EACH | Refills: 0 | Status: SHIPPED | OUTPATIENT
Start: 2023-11-08 | End: 2024-11-07

## 2023-11-08 RX ORDER — IBUPROFEN 200 MG
100 CAPSULE ORAL 2 TIMES DAILY
Qty: 100 STRIP | Refills: 11 | Status: SHIPPED | OUTPATIENT
Start: 2023-11-08

## 2023-11-08 NOTE — TELEPHONE ENCOUNTER
I resent them.  Unfortunately her pharmacy listed was [facility] so I went ahead and changed it.  Thank you I also called her to let her know that I have been sent in

## 2023-11-08 NOTE — TELEPHONE ENCOUNTER
Silva called because [facility] does not have her Rxs.  She said she has called here to get them sent in but it's not being done for some reason.  She needs:    Glipizide  Sugar Kit to check her blood sugar, including lancets, test strips  Hydroxyzine    I told her I wasn't sure what happened but I would send you a message myself to get this taken care of.

## 2023-11-10 RX ORDER — HYDROXYZINE PAMOATE 25 MG/1
25 CAPSULE ORAL 3 TIMES DAILY PRN
Qty: 90 CAPSULE | Refills: 0 | Status: SHIPPED | OUTPATIENT
Start: 2023-11-10

## 2023-11-16 ENCOUNTER — TELEPHONE (OUTPATIENT)
Dept: PRIMARY CARE | Facility: CLINIC | Age: 68
End: 2023-11-16
Payer: MEDICAID

## 2023-11-16 DIAGNOSIS — R30.0 DYSURIA: Primary | ICD-10-CM

## 2023-11-16 NOTE — TELEPHONE ENCOUNTER
I tried to call her this evening around 6 PM and I got no answer but I did get a voicemail.  I explained that I was calling to find out more details about her symptoms.  I did not leave any other information other than I was going to leave a message with you and that she could call and ask for you.  Please explained to her that with urinary incontinence there are all kinds of different causes.  We first need to rule out an infection and I would asked that she go and get a urinalysis and urine culture first.  I did go ahead and place the orders.  I would like to see her in follow-up so we can discuss her urinary incontinence in detail.  Thank you!

## 2023-11-16 NOTE — TELEPHONE ENCOUNTER
Patient called and left . Patient is requesting medication for incontinence. Stated she is wearing a pad 24/7 and it is irritating her skin. She stated that she pees when standing, sitting, etc. I did not speak with her at this time.

## 2023-11-22 ENCOUNTER — OFFICE VISIT (OUTPATIENT)
Dept: PRIMARY CARE | Facility: CLINIC | Age: 68
End: 2023-11-22
Payer: MEDICARE

## 2023-11-22 VITALS
SYSTOLIC BLOOD PRESSURE: 124 MMHG | BODY MASS INDEX: 36.44 KG/M2 | DIASTOLIC BLOOD PRESSURE: 78 MMHG | OXYGEN SATURATION: 94 % | HEART RATE: 86 BPM | WEIGHT: 219 LBS

## 2023-11-22 DIAGNOSIS — F01.50 VASCULAR DEMENTIA, UNCOMPLICATED (MULTI): ICD-10-CM

## 2023-11-22 DIAGNOSIS — R32 URINARY INCONTINENCE, UNSPECIFIED TYPE: Primary | ICD-10-CM

## 2023-11-22 DIAGNOSIS — F32.0 DEPRESSION, MAJOR, SINGLE EPISODE, MILD (CMS-HCC): ICD-10-CM

## 2023-11-22 DIAGNOSIS — I25.83 CORONARY ARTERY DISEASE DUE TO LIPID RICH PLAQUE: ICD-10-CM

## 2023-11-22 DIAGNOSIS — I25.10 CORONARY ARTERY DISEASE DUE TO LIPID RICH PLAQUE: ICD-10-CM

## 2023-11-22 PROCEDURE — 3052F HG A1C>EQUAL 8.0%<EQUAL 9.0%: CPT | Performed by: INTERNAL MEDICINE

## 2023-11-22 PROCEDURE — 99214 OFFICE O/P EST MOD 30 MIN: CPT | Performed by: INTERNAL MEDICINE

## 2023-11-22 PROCEDURE — 1160F RVW MEDS BY RX/DR IN RCRD: CPT | Performed by: INTERNAL MEDICINE

## 2023-11-22 PROCEDURE — 3078F DIAST BP <80 MM HG: CPT | Performed by: INTERNAL MEDICINE

## 2023-11-22 PROCEDURE — 1159F MED LIST DOCD IN RCRD: CPT | Performed by: INTERNAL MEDICINE

## 2023-11-22 PROCEDURE — 3074F SYST BP LT 130 MM HG: CPT | Performed by: INTERNAL MEDICINE

## 2023-11-22 PROCEDURE — 4010F ACE/ARB THERAPY RXD/TAKEN: CPT | Performed by: INTERNAL MEDICINE

## 2023-11-22 RX ORDER — NITROGLYCERIN 0.4 MG/1
0.4 TABLET SUBLINGUAL EVERY 5 MIN PRN
Qty: 30 TABLET | Refills: 1 | Status: SHIPPED | OUTPATIENT
Start: 2023-11-22

## 2023-11-22 RX ORDER — DESVENLAFAXINE 100 MG/1
100 TABLET, EXTENDED RELEASE ORAL DAILY
Qty: 90 TABLET | Refills: 1 | Status: SHIPPED | OUTPATIENT
Start: 2023-11-22

## 2023-11-22 ASSESSMENT — ENCOUNTER SYMPTOMS
WHEEZING: 0
SHORTNESS OF BREATH: 0
COUGH: 0
ARTHRALGIAS: 0
BACK PAIN: 0
FEVER: 0
DYSURIA: 0
DIARRHEA: 0
HEMATURIA: 0

## 2023-11-22 ASSESSMENT — PATIENT HEALTH QUESTIONNAIRE - PHQ9
1. LITTLE INTEREST OR PLEASURE IN DOING THINGS: NOT AT ALL
SUM OF ALL RESPONSES TO PHQ9 QUESTIONS 1 AND 2: 0
2. FEELING DOWN, DEPRESSED OR HOPELESS: NOT AT ALL

## 2023-11-22 NOTE — ASSESSMENT & PLAN NOTE
-She was diagnosed with vascular mention by a prior provider around 2018.  -I told them that it was perfectly fine to see a neurologist for evaluation and more or less a second opinion about her condition and possible treatments  -We will help facilitate a referral  -We talked about heart healthy lifestyle practices

## 2023-11-22 NOTE — PATIENT INSTRUCTIONS
As we discussed we will have you submit a urine specimen by using the clean-catch technique and once the results come back I will contact you.  The staff will be helping you to get an appointment with a neurologist to discuss your memory loss

## 2023-11-22 NOTE — PROGRESS NOTES
Subjective   Patient ID: Silva Corrigan is a 68 y.o. female who presents for UTI (Patient reports urinary incontinence. ).  UTI   Pertinent negatives include no hematuria.   She is here today for evaluation and accompanied by her son.  She has been having problems with urinary incontinence and would like to work towards a solution.  We talked about her symptoms.  She seems to have a combination of stress and urge incontinence and we discussed further evaluation.  She did have an ultrasound of her bladder which did not show a significant postvoid residual.  She denies ever seeing blood in her urine but she states she sometimes has a feeling of fullness and points to the lower portion of her pelvic region.  She does drink Coca-Cola and her son reports she probably goes through a couple of 2 L bottles every couple of days.  We talked about how the caffeinated beverage with caffeine can really cause a lot of bladder symptoms including bladder spasms and increased urgency and frequency.  I did challenge her to give up the soda to see if her urine symptoms would improve.  We talked about weaning the beverage because of caffeine withdrawal headache.  We also will be checking a clean-catch urinalysis to make sure there is no blood and also rule out any infection.  I told her that once these test results come back I will contact her and then we can decide to try medicine or even possibly see a specialist.  Her son also inquired about seeing a neurologist for her dementia.  Long ago she was diagnosed with vascular dementia and he is seeing some changes.  I told him there is nothing wrong with sitting down with a specialist just to see if there is other options or possibly treatments etc.  We talked about vascular dementia specifically and about how we want to control blood pressure well, keep cholesterol low, and practice heart healthy lifestyle practices including exercise and eating healthy.  We are providing some refills on  medications today.  Review of Systems   Constitutional:  Negative for fever.   Respiratory:  Negative for cough, shortness of breath and wheezing.    Gastrointestinal:  Negative for diarrhea.   Genitourinary:  Negative for dysuria and hematuria.   Musculoskeletal:  Negative for arthralgias and back pain.     Objective   Physical Exam  Vitals and nursing note reviewed.   Constitutional:       General: She is not in acute distress.     Appearance: Normal appearance.   HENT:      Head: Normocephalic and atraumatic.   Eyes:      Conjunctiva/sclera: Conjunctivae normal.   Cardiovascular:      Rate and Rhythm: Normal rate and regular rhythm.      Heart sounds: Normal heart sounds.   Pulmonary:      Effort: No respiratory distress.      Breath sounds: No wheezing.   Abdominal:      Palpations: Abdomen is soft.      Tenderness: There is no abdominal tenderness. There is no guarding.   Musculoskeletal:         General: No swelling. Normal range of motion.   Skin:     General: Skin is warm and dry.   Neurological:      General: No focal deficit present.      Mental Status: She is alert and oriented to person, place, and time.   Psychiatric:         Behavior: Behavior normal.       Assessment/Plan   Problem List Items Addressed This Visit             ICD-10-CM    CAD (coronary artery disease) I25.10    Relevant Medications    nitroglycerin (Nitrostat) 0.4 mg SL tablet    Depression, major, single episode, mild (CMS/HCC) F32.0     -Stable         Relevant Medications    desvenlafaxine 100 mg 24 hr tablet    Vascular dementia, uncomplicated (CMS/HCC) F01.50     -She was diagnosed with vascular mention by a prior provider around 2018.  -I told them that it was perfectly fine to see a neurologist for evaluation and more or less a second opinion about her condition and possible treatments  -We will help facilitate a referral  -We talked about heart healthy lifestyle practices         Relevant Orders    Referral to Neurology     Urinary incontinence - Primary R32     -Bladder ultrasound this past year did not show significant postvoid residual  -We discussed the role of caffeinated beverages and causing a lot of her urinary symptoms  -I have recommended she give up the caffeinated beverages and soda for the next 4 weeks to see if it has a positive impact on her urinary symptoms  -She will provide a clean-catch specimen today for checking blood and also checking for infection.  -I have agreed to contact her with the results  -We discussed possibly a trial of medication and we also talked about urogynecology         Relevant Orders    Urinalysis with Reflex Microscopic    Urine Culture          Alicia Mendoza DO

## 2023-12-12 ENCOUNTER — LAB (OUTPATIENT)
Dept: LAB | Facility: LAB | Age: 68
End: 2023-12-12
Payer: MEDICARE

## 2023-12-12 DIAGNOSIS — R32 URINARY INCONTINENCE, UNSPECIFIED TYPE: ICD-10-CM

## 2023-12-12 LAB
APPEARANCE UR: ABNORMAL
BACTERIA #/AREA URNS AUTO: ABNORMAL /HPF
BILIRUB UR STRIP.AUTO-MCNC: NEGATIVE MG/DL
COLOR UR: ABNORMAL
GLUCOSE UR STRIP.AUTO-MCNC: ABNORMAL MG/DL
KETONES UR STRIP.AUTO-MCNC: NEGATIVE MG/DL
LEUKOCYTE ESTERASE UR QL STRIP.AUTO: ABNORMAL
MUCOUS THREADS #/AREA URNS AUTO: ABNORMAL /LPF
NITRITE UR QL STRIP.AUTO: POSITIVE
PH UR STRIP.AUTO: 5 [PH]
PROT UR STRIP.AUTO-MCNC: ABNORMAL MG/DL
RBC # UR STRIP.AUTO: NEGATIVE /UL
RBC #/AREA URNS AUTO: ABNORMAL /HPF
SP GR UR STRIP.AUTO: 1.03
SQUAMOUS #/AREA URNS AUTO: ABNORMAL /HPF
UROBILINOGEN UR STRIP.AUTO-MCNC: <2 MG/DL
WBC #/AREA URNS AUTO: >50 /HPF
WBC CLUMPS #/AREA URNS AUTO: ABNORMAL /HPF

## 2023-12-12 PROCEDURE — 81001 URINALYSIS AUTO W/SCOPE: CPT

## 2023-12-14 DIAGNOSIS — N30.00 ACUTE CYSTITIS WITHOUT HEMATURIA: Primary | ICD-10-CM

## 2023-12-14 RX ORDER — NITROFURANTOIN 25; 75 MG/1; MG/1
100 CAPSULE ORAL 2 TIMES DAILY
Qty: 10 CAPSULE | Refills: 0 | Status: SHIPPED | OUTPATIENT
Start: 2023-12-14 | End: 2023-12-19

## 2023-12-14 NOTE — RESULT ENCOUNTER NOTE
I called her.  We had ordered a urinalysis a long time ago but she just recently ended up going to get the urinalysis.  She states she is having some slight discomfort with urination.  I told her I thought she had a urinary tract infection and we would treat her with Macrobid.  I sent to her pharmacy and asked that she give us a call after the completion of the antibiotic to let us know if she has any more symptoms.  She agreed

## 2024-02-13 DIAGNOSIS — F32.0 DEPRESSION, MAJOR, SINGLE EPISODE, MILD (CMS-HCC): ICD-10-CM

## 2024-02-14 RX ORDER — BUPROPION HYDROCHLORIDE 300 MG/1
300 TABLET ORAL DAILY
Qty: 90 TABLET | Refills: 1 | Status: SHIPPED | OUTPATIENT
Start: 2024-02-14

## 2024-02-25 ENCOUNTER — CLINICAL SUPPORT (OUTPATIENT)
Dept: CARDIOLOGY | Facility: CLINIC | Age: 69
End: 2024-02-25
Payer: MEDICARE

## 2024-02-25 ENCOUNTER — APPOINTMENT (OUTPATIENT)
Dept: RADIOLOGY | Facility: HOSPITAL | Age: 69
End: 2024-02-25
Payer: MEDICARE

## 2024-02-25 ENCOUNTER — HOSPITAL ENCOUNTER (OUTPATIENT)
Facility: HOSPITAL | Age: 69
Setting detail: OBSERVATION
LOS: 1 days | Discharge: NURSING, REHAB OR LONG-TERM CARE | End: 2024-02-27
Attending: EMERGENCY MEDICINE | Admitting: INTERNAL MEDICINE
Payer: MEDICARE

## 2024-02-25 DIAGNOSIS — Z74.09 IMPAIRED MOBILITY AND ADLS: ICD-10-CM

## 2024-02-25 DIAGNOSIS — R29.6 FREQUENT FALLS: Primary | ICD-10-CM

## 2024-02-25 DIAGNOSIS — S10.93XA CONTUSION OF NECK, INITIAL ENCOUNTER: ICD-10-CM

## 2024-02-25 DIAGNOSIS — S09.90XA HEAD INJURY, INITIAL ENCOUNTER: ICD-10-CM

## 2024-02-25 DIAGNOSIS — Z78.9 IMPAIRED MOBILITY AND ADLS: ICD-10-CM

## 2024-02-25 LAB
ALBUMIN SERPL BCP-MCNC: 3.6 G/DL (ref 3.4–5)
ALP SERPL-CCNC: 54 U/L (ref 33–136)
ALT SERPL W P-5'-P-CCNC: 13 U/L (ref 7–45)
ANION GAP SERPL CALC-SCNC: 12 MMOL/L (ref 10–20)
APPEARANCE UR: CLEAR
AST SERPL W P-5'-P-CCNC: 13 U/L (ref 9–39)
BACTERIA #/AREA URNS AUTO: ABNORMAL /HPF
BILIRUB SERPL-MCNC: 0.8 MG/DL (ref 0–1.2)
BILIRUB UR STRIP.AUTO-MCNC: NEGATIVE MG/DL
BUN SERPL-MCNC: 12 MG/DL (ref 6–23)
CALCIUM SERPL-MCNC: 8.8 MG/DL (ref 8.6–10.3)
CARDIAC TROPONIN I PNL SERPL HS: 3 NG/L (ref 0–13)
CHLORIDE SERPL-SCNC: 102 MMOL/L (ref 98–107)
CO2 SERPL-SCNC: 27 MMOL/L (ref 21–32)
COLOR UR: YELLOW
CREAT SERPL-MCNC: 0.66 MG/DL (ref 0.5–1.05)
EGFRCR SERPLBLD CKD-EPI 2021: >90 ML/MIN/1.73M*2
ERYTHROCYTE [DISTWIDTH] IN BLOOD BY AUTOMATED COUNT: 14.1 % (ref 11.5–14.5)
FLUAV RNA RESP QL NAA+PROBE: NOT DETECTED
FLUBV RNA RESP QL NAA+PROBE: NOT DETECTED
GLUCOSE SERPL-MCNC: 161 MG/DL (ref 74–99)
GLUCOSE UR STRIP.AUTO-MCNC: NEGATIVE MG/DL
HCT VFR BLD AUTO: 39.8 % (ref 36–46)
HGB BLD-MCNC: 13.1 G/DL (ref 12–16)
KETONES UR STRIP.AUTO-MCNC: NEGATIVE MG/DL
LEUKOCYTE ESTERASE UR QL STRIP.AUTO: ABNORMAL
MCH RBC QN AUTO: 28.5 PG (ref 26–34)
MCHC RBC AUTO-ENTMCNC: 32.9 G/DL (ref 32–36)
MCV RBC AUTO: 87 FL (ref 80–100)
NITRITE UR QL STRIP.AUTO: NEGATIVE
NRBC BLD-RTO: 0 /100 WBCS (ref 0–0)
PH UR STRIP.AUTO: 6 [PH]
PLATELET # BLD AUTO: 253 X10*3/UL (ref 150–450)
POTASSIUM SERPL-SCNC: 3.2 MMOL/L (ref 3.5–5.3)
PROT SERPL-MCNC: 6.3 G/DL (ref 6.4–8.2)
PROT UR STRIP.AUTO-MCNC: NEGATIVE MG/DL
RBC # BLD AUTO: 4.6 X10*6/UL (ref 4–5.2)
RBC # UR STRIP.AUTO: NEGATIVE /UL
RBC #/AREA URNS AUTO: ABNORMAL /HPF
SARS-COV-2 RNA RESP QL NAA+PROBE: NOT DETECTED
SODIUM SERPL-SCNC: 138 MMOL/L (ref 136–145)
SP GR UR STRIP.AUTO: 1.01
SQUAMOUS #/AREA URNS AUTO: ABNORMAL /HPF
UROBILINOGEN UR STRIP.AUTO-MCNC: 4 MG/DL
WBC # BLD AUTO: 10.9 X10*3/UL (ref 4.4–11.3)
WBC #/AREA URNS AUTO: ABNORMAL /HPF

## 2024-02-25 PROCEDURE — 1100000001 HC PRIVATE ROOM DAILY

## 2024-02-25 PROCEDURE — 72125 CT NECK SPINE W/O DYE: CPT

## 2024-02-25 PROCEDURE — 84484 ASSAY OF TROPONIN QUANT: CPT | Performed by: PHYSICIAN ASSISTANT

## 2024-02-25 PROCEDURE — 73560 X-RAY EXAM OF KNEE 1 OR 2: CPT | Mod: RIGHT SIDE | Performed by: RADIOLOGY

## 2024-02-25 PROCEDURE — 36415 COLL VENOUS BLD VENIPUNCTURE: CPT | Performed by: PHYSICIAN ASSISTANT

## 2024-02-25 PROCEDURE — 80053 COMPREHEN METABOLIC PANEL: CPT | Performed by: PHYSICIAN ASSISTANT

## 2024-02-25 PROCEDURE — 93005 ELECTROCARDIOGRAM TRACING: CPT

## 2024-02-25 PROCEDURE — 99222 1ST HOSP IP/OBS MODERATE 55: CPT | Performed by: INTERNAL MEDICINE

## 2024-02-25 PROCEDURE — 85027 COMPLETE CBC AUTOMATED: CPT | Performed by: PHYSICIAN ASSISTANT

## 2024-02-25 PROCEDURE — 72100 X-RAY EXAM L-S SPINE 2/3 VWS: CPT

## 2024-02-25 PROCEDURE — 87086 URINE CULTURE/COLONY COUNT: CPT | Mod: SAMLAB | Performed by: PHYSICIAN ASSISTANT

## 2024-02-25 PROCEDURE — 2500000001 HC RX 250 WO HCPCS SELF ADMINISTERED DRUGS (ALT 637 FOR MEDICARE OP): Performed by: STUDENT IN AN ORGANIZED HEALTH CARE EDUCATION/TRAINING PROGRAM

## 2024-02-25 PROCEDURE — 72170 X-RAY EXAM OF PELVIS: CPT | Mod: FOREIGN READ | Performed by: RADIOLOGY

## 2024-02-25 PROCEDURE — 96361 HYDRATE IV INFUSION ADD-ON: CPT | Mod: 59

## 2024-02-25 PROCEDURE — 72100 X-RAY EXAM L-S SPINE 2/3 VWS: CPT | Mod: FOREIGN READ | Performed by: RADIOLOGY

## 2024-02-25 PROCEDURE — 70450 CT HEAD/BRAIN W/O DYE: CPT | Performed by: RADIOLOGY

## 2024-02-25 PROCEDURE — 2500000004 HC RX 250 GENERAL PHARMACY W/ HCPCS (ALT 636 FOR OP/ED): Performed by: PHYSICIAN ASSISTANT

## 2024-02-25 PROCEDURE — 73560 X-RAY EXAM OF KNEE 1 OR 2: CPT | Mod: RT

## 2024-02-25 PROCEDURE — 72170 X-RAY EXAM OF PELVIS: CPT

## 2024-02-25 PROCEDURE — 99285 EMERGENCY DEPT VISIT HI MDM: CPT | Mod: 25

## 2024-02-25 PROCEDURE — 72125 CT NECK SPINE W/O DYE: CPT | Performed by: RADIOLOGY

## 2024-02-25 PROCEDURE — 81001 URINALYSIS AUTO W/SCOPE: CPT | Performed by: PHYSICIAN ASSISTANT

## 2024-02-25 PROCEDURE — 70450 CT HEAD/BRAIN W/O DYE: CPT

## 2024-02-25 PROCEDURE — 87636 SARSCOV2 & INF A&B AMP PRB: CPT | Performed by: PHYSICIAN ASSISTANT

## 2024-02-25 RX ORDER — ROSUVASTATIN CALCIUM 5 MG/1
5 TABLET, COATED ORAL DAILY
Status: DISCONTINUED | OUTPATIENT
Start: 2024-02-25 | End: 2024-02-25 | Stop reason: ALTCHOICE

## 2024-02-25 RX ORDER — SODIUM CHLORIDE 9 MG/ML
125 INJECTION, SOLUTION INTRAVENOUS CONTINUOUS
Status: DISCONTINUED | OUTPATIENT
Start: 2024-02-25 | End: 2024-02-25

## 2024-02-25 RX ORDER — NITROGLYCERIN 0.4 MG/1
0.4 TABLET SUBLINGUAL EVERY 5 MIN PRN
Status: DISCONTINUED | OUTPATIENT
Start: 2024-02-25 | End: 2024-02-27 | Stop reason: HOSPADM

## 2024-02-25 RX ORDER — GABAPENTIN 300 MG/1
300 CAPSULE ORAL 3 TIMES DAILY
Status: DISCONTINUED | OUTPATIENT
Start: 2024-02-25 | End: 2024-02-26 | Stop reason: ALTCHOICE

## 2024-02-25 RX ORDER — BUPROPION HYDROCHLORIDE 300 MG/1
300 TABLET ORAL DAILY
Status: DISCONTINUED | OUTPATIENT
Start: 2024-02-25 | End: 2024-02-27 | Stop reason: HOSPADM

## 2024-02-25 RX ORDER — INSULIN LISPRO 100 [IU]/ML
0-10 INJECTION, SOLUTION INTRAVENOUS; SUBCUTANEOUS
Status: DISCONTINUED | OUTPATIENT
Start: 2024-02-26 | End: 2024-02-27 | Stop reason: HOSPADM

## 2024-02-25 RX ORDER — NAPROXEN SODIUM 220 MG/1
81 TABLET, FILM COATED ORAL DAILY
Status: DISCONTINUED | OUTPATIENT
Start: 2024-02-25 | End: 2024-02-27 | Stop reason: HOSPADM

## 2024-02-25 RX ORDER — FENTANYL CITRATE 50 UG/ML
50 INJECTION, SOLUTION INTRAMUSCULAR; INTRAVENOUS ONCE
Status: DISCONTINUED | OUTPATIENT
Start: 2024-02-25 | End: 2024-02-27 | Stop reason: HOSPADM

## 2024-02-25 RX ORDER — PANTOPRAZOLE SODIUM 40 MG/1
40 TABLET, DELAYED RELEASE ORAL
Status: DISCONTINUED | OUTPATIENT
Start: 2024-02-26 | End: 2024-02-27 | Stop reason: HOSPADM

## 2024-02-25 RX ORDER — DESVENLAFAXINE 50 MG/1
100 TABLET, EXTENDED RELEASE ORAL DAILY
Status: DISCONTINUED | OUTPATIENT
Start: 2024-02-25 | End: 2024-02-27 | Stop reason: HOSPADM

## 2024-02-25 RX ORDER — DEXTROSE 50 % IN WATER (D50W) INTRAVENOUS SYRINGE
25
Status: DISCONTINUED | OUTPATIENT
Start: 2024-02-25 | End: 2024-02-27 | Stop reason: HOSPADM

## 2024-02-25 RX ORDER — HYDROXYZINE PAMOATE 25 MG/1
25 CAPSULE ORAL 3 TIMES DAILY PRN
Status: DISCONTINUED | OUTPATIENT
Start: 2024-02-25 | End: 2024-02-27 | Stop reason: HOSPADM

## 2024-02-25 RX ORDER — ONDANSETRON HYDROCHLORIDE 2 MG/ML
4 INJECTION, SOLUTION INTRAVENOUS ONCE
Status: DISCONTINUED | OUTPATIENT
Start: 2024-02-25 | End: 2024-02-27 | Stop reason: HOSPADM

## 2024-02-25 RX ORDER — LOSARTAN POTASSIUM 50 MG/1
50 TABLET ORAL DAILY
Status: DISCONTINUED | OUTPATIENT
Start: 2024-02-25 | End: 2024-02-27 | Stop reason: HOSPADM

## 2024-02-25 RX ORDER — METOPROLOL TARTRATE 100 MG/1
100 TABLET ORAL 2 TIMES DAILY
Status: DISCONTINUED | OUTPATIENT
Start: 2024-02-25 | End: 2024-02-27 | Stop reason: HOSPADM

## 2024-02-25 RX ORDER — DEXTROSE MONOHYDRATE 100 MG/ML
0.3 INJECTION, SOLUTION INTRAVENOUS ONCE AS NEEDED
Status: DISCONTINUED | OUTPATIENT
Start: 2024-02-25 | End: 2024-02-27 | Stop reason: HOSPADM

## 2024-02-25 RX ORDER — ATORVASTATIN CALCIUM 20 MG/1
20 TABLET, FILM COATED ORAL NIGHTLY
Status: DISCONTINUED | OUTPATIENT
Start: 2024-02-25 | End: 2024-02-27 | Stop reason: HOSPADM

## 2024-02-25 RX ORDER — TRAZODONE HYDROCHLORIDE 50 MG/1
50 TABLET ORAL NIGHTLY
Status: DISCONTINUED | OUTPATIENT
Start: 2024-02-25 | End: 2024-02-27 | Stop reason: HOSPADM

## 2024-02-25 RX ADMIN — METOPROLOL TARTRATE 100 MG: 100 TABLET, FILM COATED ORAL at 20:06

## 2024-02-25 RX ADMIN — SODIUM CHLORIDE 500 ML: 9 INJECTION, SOLUTION INTRAVENOUS at 14:00

## 2024-02-25 RX ADMIN — ATORVASTATIN CALCIUM 20 MG: 20 TABLET, FILM COATED ORAL at 20:06

## 2024-02-25 RX ADMIN — TRAZODONE HYDROCHLORIDE 50 MG: 50 TABLET ORAL at 20:06

## 2024-02-25 RX ADMIN — GABAPENTIN 300 MG: 300 CAPSULE ORAL at 20:06

## 2024-02-25 SDOH — SOCIAL STABILITY: SOCIAL INSECURITY: ARE THERE ANY APPARENT SIGNS OF INJURIES/BEHAVIORS THAT COULD BE RELATED TO ABUSE/NEGLECT?: NO

## 2024-02-25 SDOH — SOCIAL STABILITY: SOCIAL INSECURITY: DO YOU FEEL UNSAFE GOING BACK TO THE PLACE WHERE YOU ARE LIVING?: NO

## 2024-02-25 SDOH — SOCIAL STABILITY: SOCIAL INSECURITY: DOES ANYONE TRY TO KEEP YOU FROM HAVING/CONTACTING OTHER FRIENDS OR DOING THINGS OUTSIDE YOUR HOME?: NO

## 2024-02-25 SDOH — SOCIAL STABILITY: SOCIAL INSECURITY: WERE YOU ABLE TO COMPLETE ALL THE BEHAVIORAL HEALTH SCREENINGS?: YES

## 2024-02-25 SDOH — SOCIAL STABILITY: SOCIAL INSECURITY: ABUSE: ADULT

## 2024-02-25 SDOH — SOCIAL STABILITY: SOCIAL INSECURITY: DO YOU FEEL ANYONE HAS EXPLOITED OR TAKEN ADVANTAGE OF YOU FINANCIALLY OR OF YOUR PERSONAL PROPERTY?: NO

## 2024-02-25 SDOH — SOCIAL STABILITY: SOCIAL INSECURITY: HAS ANYONE EVER THREATENED TO HURT YOUR FAMILY OR YOUR PETS?: NO

## 2024-02-25 SDOH — SOCIAL STABILITY: SOCIAL INSECURITY: HAVE YOU HAD THOUGHTS OF HARMING ANYONE ELSE?: NO

## 2024-02-25 SDOH — SOCIAL STABILITY: SOCIAL INSECURITY: ARE YOU OR HAVE YOU BEEN THREATENED OR ABUSED PHYSICALLY, EMOTIONALLY, OR SEXUALLY BY ANYONE?: NO

## 2024-02-25 ASSESSMENT — PATIENT HEALTH QUESTIONNAIRE - PHQ9
2. FEELING DOWN, DEPRESSED OR HOPELESS: NOT AT ALL
SUM OF ALL RESPONSES TO PHQ9 QUESTIONS 1 & 2: 0
1. LITTLE INTEREST OR PLEASURE IN DOING THINGS: NOT AT ALL

## 2024-02-25 ASSESSMENT — ACTIVITIES OF DAILY LIVING (ADL)
ADEQUATE_TO_COMPLETE_ADL: YES
ASSISTIVE_DEVICE: WALKER
FEEDING YOURSELF: NEEDS ASSISTANCE
PATIENT'S MEMORY ADEQUATE TO SAFELY COMPLETE DAILY ACTIVITIES?: NO
JUDGMENT_ADEQUATE_SAFELY_COMPLETE_DAILY_ACTIVITIES: YES
TOILETING: NEEDS ASSISTANCE
HEARING - RIGHT EAR: FUNCTIONAL
GROOMING: NEEDS ASSISTANCE
WALKS IN HOME: NEEDS ASSISTANCE
BATHING: NEEDS ASSISTANCE
LACK_OF_TRANSPORTATION: PATIENT UNABLE TO ANSWER
HEARING - LEFT EAR: FUNCTIONAL
DRESSING YOURSELF: NEEDS ASSISTANCE

## 2024-02-25 ASSESSMENT — COGNITIVE AND FUNCTIONAL STATUS - GENERAL
TOILETING: A LOT
DAILY ACTIVITIY SCORE: 13
DRESSING REGULAR UPPER BODY CLOTHING: A LOT
WALKING IN HOSPITAL ROOM: A LOT
EATING MEALS: A LITTLE
STANDING UP FROM CHAIR USING ARMS: A LOT
CLIMB 3 TO 5 STEPS WITH RAILING: A LOT
MOVING FROM LYING ON BACK TO SITTING ON SIDE OF FLAT BED WITH BEDRAILS: A LOT
MOBILITY SCORE: 12
MOVING TO AND FROM BED TO CHAIR: A LOT
DRESSING REGULAR LOWER BODY CLOTHING: A LOT
HELP NEEDED FOR BATHING: A LOT
PATIENT BASELINE BEDBOUND: NO
PERSONAL GROOMING: A LOT
TURNING FROM BACK TO SIDE WHILE IN FLAT BAD: A LOT

## 2024-02-25 ASSESSMENT — LIFESTYLE VARIABLES
AUDIT-C TOTAL SCORE: 0
HOW OFTEN DO YOU HAVE A DRINK CONTAINING ALCOHOL: NEVER
HOW OFTEN DO YOU HAVE 6 OR MORE DRINKS ON ONE OCCASION: NEVER
SKIP TO QUESTIONS 9-10: 1
AUDIT-C TOTAL SCORE: 0
HOW MANY STANDARD DRINKS CONTAINING ALCOHOL DO YOU HAVE ON A TYPICAL DAY: PATIENT DOES NOT DRINK

## 2024-02-25 ASSESSMENT — COLUMBIA-SUICIDE SEVERITY RATING SCALE - C-SSRS
2. HAVE YOU ACTUALLY HAD ANY THOUGHTS OF KILLING YOURSELF?: NO
6. HAVE YOU EVER DONE ANYTHING, STARTED TO DO ANYTHING, OR PREPARED TO DO ANYTHING TO END YOUR LIFE?: NO
1. IN THE PAST MONTH, HAVE YOU WISHED YOU WERE DEAD OR WISHED YOU COULD GO TO SLEEP AND NOT WAKE UP?: NO

## 2024-02-25 ASSESSMENT — PAIN SCALES - GENERAL
PAINLEVEL_OUTOF10: 5 - MODERATE PAIN
PAINLEVEL_OUTOF10: 2

## 2024-02-25 ASSESSMENT — PAIN - FUNCTIONAL ASSESSMENT: PAIN_FUNCTIONAL_ASSESSMENT: PAINAD (PAIN ASSESSMENT IN ADVANCED DEMENTIA SCALE)

## 2024-02-25 NOTE — ED PROVIDER NOTES
HPI   Chief Complaint   Patient presents with    Fall     Brought to ED per [facility] with C-collar in place from home with report of frequent falls over the last week. She c/o R knee pain and neck/back pain. Pt has dementia and EMS reports that family told them that she is supposed to be going to a nursing home in the near future.        History comes from son who the patient is living with currently.  Son states in the past few weeks the patient has drastically decline in her ability to perform ADLs and assist in her care.  She is no longer being able to bathe herself or care for herself in general.  The son states he and his  have to walk her to the bathroom and both of them have to hold her up to use the facilities.  Patient has not had any recent illnesses.  No fever or chills.  Patient is being fed by the family and therefore appetite is uncertain.  The son also noted that the patient is wandering about and getting up without asking for assistance and she is following no less than 3 times today.  She is now complaining of neck pain.  From one of her falls.      History provided by:  Relative  History limited by:  Dementia                      Opal Coma Scale Score: 14                     Patient History   Past Medical History:   Diagnosis Date    Hyperlipidemia, unspecified 07/14/2022    Hyperlipidemia LDL goal <70     Past Surgical History:   Procedure Laterality Date    OTHER SURGICAL HISTORY  06/02/2022    Knee surgery    OTHER SURGICAL HISTORY  06/02/2022    Mastectomy    OTHER SURGICAL HISTORY  06/02/2022    Knee replacement    OTHER SURGICAL HISTORY  06/02/2022    Carpal tunnel surgery     Family History   Problem Relation Name Age of Onset    No Known Problems Mother      No Known Problems Father       Social History     Tobacco Use    Smoking status: Some Days     Types: Cigarettes    Smokeless tobacco: Never   Substance Use Topics    Alcohol use: Not Currently    Drug use: Not Currently        Physical Exam   ED Triage Vitals [02/25/24 1327]   Temperature Heart Rate Respirations BP   36.6 °C (97.9 °F) 74 16 (!) 110/92      Pulse Ox Temp src Heart Rate Source Patient Position   94 % -- -- --      BP Location FiO2 (%)     -- --       Physical Exam  Vitals and nursing note reviewed.   Constitutional:       General: She is not in acute distress.     Appearance: Normal appearance. She is normal weight. She is not ill-appearing or toxic-appearing.   HENT:      Head: Normocephalic and atraumatic.      Right Ear: Ear canal and external ear normal.      Left Ear: Ear canal and external ear normal.      Nose: Nose normal.      Mouth/Throat:      Lips: Pink. No lesions.      Mouth: Mucous membranes are moist.      Pharynx: Oropharynx is clear. No oropharyngeal exudate.   Eyes:      General: No scleral icterus.     Conjunctiva/sclera: Conjunctivae normal.   Cardiovascular:      Rate and Rhythm: Normal rate and regular rhythm.      Pulses:           Radial pulses are 2+ on the right side and 2+ on the left side.        Dorsalis pedis pulses are 2+ on the right side and 2+ on the left side.        Posterior tibial pulses are 2+ on the right side and 2+ on the left side.      Heart sounds: Normal heart sounds.   Pulmonary:      Effort: Pulmonary effort is normal.      Breath sounds: Normal breath sounds and air entry.   Chest:      Chest wall: No tenderness.   Abdominal:      General: Bowel sounds are normal. There is no distension.      Palpations: Abdomen is soft.      Tenderness: There is no abdominal tenderness. There is no right CVA tenderness, left CVA tenderness or guarding.   Musculoskeletal:         General: No tenderness or deformity.      Cervical back: No tenderness.      Right lower leg: No edema.      Left lower leg: No edema.      Comments: Palpating upper and lower extremities does not elicit any areas of discomfort.  Range of motion is intact.  No midline axial spine tenderness on exam.  No  step-off deformity   Skin:     General: Skin is warm.      Capillary Refill: Capillary refill takes less than 2 seconds.      Findings: No bruising or lesion.   Neurological:      General: No focal deficit present.      Mental Status: She is alert. Mental status is at baseline. She is disoriented.      Cranial Nerves: No cranial nerve deficit or facial asymmetry.      Sensory: No sensory deficit.      Motor: No weakness.   Psychiatric:         Attention and Perception: Attention normal.         Mood and Affect: Affect is labile.         Speech: Speech normal.         Behavior: Behavior normal. Behavior is cooperative.         Cognition and Memory: Cognition is impaired. Memory is impaired.         Judgment: Judgment is impulsive.         ED Course & MDM   Diagnoses as of 02/25/24 1709   Frequent falls   Contusion of neck, initial encounter   Impaired mobility and ADLs   Head injury, initial encounter       Medical Decision Making  History comes from son who the patient is living with currently.  Son states in the past few weeks the patient has drastically decline in her ability to perform ADLs and assist in her care.  She is no longer being able to bathe herself or care for herself in general.  The son states he and his  have to walk her to the bathroom and both of them have to hold her up to use the facilities.  Patient has not had any recent illnesses.  No fever or chills.  Patient is being fed by the family and therefore appetite is uncertain.  The son also noted that the patient is wandering about and getting up without asking for assistance and she is following no less than 3 times today.  She is now complaining of neck pain.  From one of her falls.    Ddx: Infection, fracture, contusion, CVA, electrolyte, metabolic, other    Will obtain basic labs and radiographic studies.    No acute concerns were noted.  Patient was found multiple times trying to get out of bed.  Her ability to understand what the  expectations of her behavior are here in the ED seem beyond her scope and ability.  The family although has an appointment tomorrow at the nursing facility they do not feel comfortable taking the patient home therefore I consulted the hospitalist for admission with acceptance    Problems Addressed:  Contusion of neck, initial encounter: acute illness or injury  Frequent falls: undiagnosed new problem with uncertain prognosis  Head injury, initial encounter: acute illness or injury  Impaired mobility and ADLs: undiagnosed new problem with uncertain prognosis    Amount and/or Complexity of Data Reviewed  Labs: ordered. Decision-making details documented in ED Course.  Radiology: ordered and independent interpretation performed. Decision-making details documented in ED Course.  ECG/medicine tests: ordered and independent interpretation performed. Decision-making details documented in ED Course.     Details: Read by myself showing normal sinus rhythm at a ventricular rate of 75 bpm.  IN interval 118 and a QT of 424.  Normal axis.  No ST segment elevation        Procedure  Procedures     Gina Miramontes PA-C  02/25/24 9341

## 2024-02-26 ENCOUNTER — TELEPHONE (OUTPATIENT)
Dept: PRIMARY CARE | Facility: CLINIC | Age: 69
End: 2024-02-26
Payer: MEDICARE

## 2024-02-26 PROBLEM — R29.6 FALLING: Status: ACTIVE | Noted: 2024-02-26

## 2024-02-26 LAB
ALBUMIN SERPL BCP-MCNC: 3.3 G/DL (ref 3.4–5)
ANION GAP SERPL CALC-SCNC: 9 MMOL/L (ref 10–20)
BACTERIA UR CULT: NO GROWTH
BUN SERPL-MCNC: 9 MG/DL (ref 6–23)
CALCIUM SERPL-MCNC: 8.7 MG/DL (ref 8.6–10.3)
CHLORIDE SERPL-SCNC: 106 MMOL/L (ref 98–107)
CO2 SERPL-SCNC: 29 MMOL/L (ref 21–32)
CREAT SERPL-MCNC: 0.66 MG/DL (ref 0.5–1.05)
EGFRCR SERPLBLD CKD-EPI 2021: >90 ML/MIN/1.73M*2
ERYTHROCYTE [DISTWIDTH] IN BLOOD BY AUTOMATED COUNT: 14.5 % (ref 11.5–14.5)
GLUCOSE BLD MANUAL STRIP-MCNC: 162 MG/DL (ref 74–99)
GLUCOSE BLD MANUAL STRIP-MCNC: 165 MG/DL (ref 74–99)
GLUCOSE BLD MANUAL STRIP-MCNC: 169 MG/DL (ref 74–99)
GLUCOSE BLD MANUAL STRIP-MCNC: 74 MG/DL (ref 74–99)
GLUCOSE SERPL-MCNC: 58 MG/DL (ref 74–99)
HCT VFR BLD AUTO: 37 % (ref 36–46)
HGB BLD-MCNC: 12.2 G/DL (ref 12–16)
HOLD SPECIMEN: NORMAL
MAGNESIUM SERPL-MCNC: 1.85 MG/DL (ref 1.6–2.4)
MCH RBC QN AUTO: 28.4 PG (ref 26–34)
MCHC RBC AUTO-ENTMCNC: 33 G/DL (ref 32–36)
MCV RBC AUTO: 86 FL (ref 80–100)
NRBC BLD-RTO: 0 /100 WBCS (ref 0–0)
PHOSPHATE SERPL-MCNC: 4.1 MG/DL (ref 2.5–4.9)
PLATELET # BLD AUTO: 256 X10*3/UL (ref 150–450)
POTASSIUM SERPL-SCNC: 2.8 MMOL/L (ref 3.5–5.3)
POTASSIUM SERPL-SCNC: 3.6 MMOL/L (ref 3.5–5.3)
RBC # BLD AUTO: 4.29 X10*6/UL (ref 4–5.2)
SODIUM SERPL-SCNC: 141 MMOL/L (ref 136–145)
WBC # BLD AUTO: 8.6 X10*3/UL (ref 4.4–11.3)

## 2024-02-26 PROCEDURE — 85027 COMPLETE CBC AUTOMATED: CPT | Performed by: STUDENT IN AN ORGANIZED HEALTH CARE EDUCATION/TRAINING PROGRAM

## 2024-02-26 PROCEDURE — 97161 PT EVAL LOW COMPLEX 20 MIN: CPT | Mod: GP

## 2024-02-26 PROCEDURE — 2500000004 HC RX 250 GENERAL PHARMACY W/ HCPCS (ALT 636 FOR OP/ED): Performed by: INTERNAL MEDICINE

## 2024-02-26 PROCEDURE — 96366 THER/PROPH/DIAG IV INF ADDON: CPT | Mod: 59

## 2024-02-26 PROCEDURE — 96365 THER/PROPH/DIAG IV INF INIT: CPT | Mod: 59

## 2024-02-26 PROCEDURE — 2500000001 HC RX 250 WO HCPCS SELF ADMINISTERED DRUGS (ALT 637 FOR MEDICARE OP): Performed by: INTERNAL MEDICINE

## 2024-02-26 PROCEDURE — 84100 ASSAY OF PHOSPHORUS: CPT | Performed by: STUDENT IN AN ORGANIZED HEALTH CARE EDUCATION/TRAINING PROGRAM

## 2024-02-26 PROCEDURE — 36415 COLL VENOUS BLD VENIPUNCTURE: CPT

## 2024-02-26 PROCEDURE — 2500000002 HC RX 250 W HCPCS SELF ADMINISTERED DRUGS (ALT 637 FOR MEDICARE OP, ALT 636 FOR OP/ED): Performed by: STUDENT IN AN ORGANIZED HEALTH CARE EDUCATION/TRAINING PROGRAM

## 2024-02-26 PROCEDURE — 83735 ASSAY OF MAGNESIUM: CPT | Performed by: STUDENT IN AN ORGANIZED HEALTH CARE EDUCATION/TRAINING PROGRAM

## 2024-02-26 PROCEDURE — 2500000004 HC RX 250 GENERAL PHARMACY W/ HCPCS (ALT 636 FOR OP/ED)

## 2024-02-26 PROCEDURE — 2500000001 HC RX 250 WO HCPCS SELF ADMINISTERED DRUGS (ALT 637 FOR MEDICARE OP): Performed by: STUDENT IN AN ORGANIZED HEALTH CARE EDUCATION/TRAINING PROGRAM

## 2024-02-26 PROCEDURE — G0378 HOSPITAL OBSERVATION PER HR: HCPCS

## 2024-02-26 PROCEDURE — 96374 THER/PROPH/DIAG INJ IV PUSH: CPT | Mod: 59

## 2024-02-26 PROCEDURE — 97165 OT EVAL LOW COMPLEX 30 MIN: CPT | Mod: GO

## 2024-02-26 PROCEDURE — 84132 ASSAY OF SERUM POTASSIUM: CPT | Mod: 59

## 2024-02-26 PROCEDURE — 82947 ASSAY GLUCOSE BLOOD QUANT: CPT | Mod: 59

## 2024-02-26 PROCEDURE — 36415 COLL VENOUS BLD VENIPUNCTURE: CPT | Performed by: STUDENT IN AN ORGANIZED HEALTH CARE EDUCATION/TRAINING PROGRAM

## 2024-02-26 PROCEDURE — 2500000002 HC RX 250 W HCPCS SELF ADMINISTERED DRUGS (ALT 637 FOR MEDICARE OP, ALT 636 FOR OP/ED)

## 2024-02-26 PROCEDURE — 99232 SBSQ HOSP IP/OBS MODERATE 35: CPT

## 2024-02-26 RX ORDER — POTASSIUM CHLORIDE 20 MEQ/1
40 TABLET, EXTENDED RELEASE ORAL ONCE
Status: COMPLETED | OUTPATIENT
Start: 2024-02-26 | End: 2024-02-26

## 2024-02-26 RX ORDER — MORPHINE SULFATE 2 MG/ML
2 INJECTION, SOLUTION INTRAMUSCULAR; INTRAVENOUS
Status: DISCONTINUED | OUTPATIENT
Start: 2024-02-26 | End: 2024-02-27 | Stop reason: HOSPADM

## 2024-02-26 RX ORDER — ACETAMINOPHEN 325 MG/1
975 TABLET ORAL 3 TIMES DAILY
Status: DISCONTINUED | OUTPATIENT
Start: 2024-02-26 | End: 2024-02-27 | Stop reason: HOSPADM

## 2024-02-26 RX ORDER — TRAMADOL HYDROCHLORIDE 50 MG/1
50 TABLET ORAL EVERY 6 HOURS PRN
Status: DISCONTINUED | OUTPATIENT
Start: 2024-02-26 | End: 2024-02-27 | Stop reason: HOSPADM

## 2024-02-26 RX ORDER — POTASSIUM CHLORIDE 14.9 MG/ML
20 INJECTION INTRAVENOUS ONCE
Status: COMPLETED | OUTPATIENT
Start: 2024-02-26 | End: 2024-02-26

## 2024-02-26 RX ADMIN — METOPROLOL TARTRATE 100 MG: 100 TABLET, FILM COATED ORAL at 08:57

## 2024-02-26 RX ADMIN — MORPHINE SULFATE 2 MG: 2 INJECTION, SOLUTION INTRAMUSCULAR; INTRAVENOUS at 09:17

## 2024-02-26 RX ADMIN — ASPIRIN 81 MG CHEWABLE TABLET 81 MG: 81 TABLET CHEWABLE at 08:57

## 2024-02-26 RX ADMIN — ACETAMINOPHEN 975 MG: 325 TABLET ORAL at 14:42

## 2024-02-26 RX ADMIN — ATORVASTATIN CALCIUM 20 MG: 20 TABLET, FILM COATED ORAL at 21:02

## 2024-02-26 RX ADMIN — TRAMADOL HYDROCHLORIDE 50 MG: 50 TABLET, COATED ORAL at 21:05

## 2024-02-26 RX ADMIN — POTASSIUM CHLORIDE 20 MEQ: 14.9 INJECTION, SOLUTION INTRAVENOUS at 08:57

## 2024-02-26 RX ADMIN — TRAMADOL HYDROCHLORIDE 50 MG: 50 TABLET, COATED ORAL at 06:25

## 2024-02-26 RX ADMIN — ACETAMINOPHEN 975 MG: 325 TABLET ORAL at 21:02

## 2024-02-26 RX ADMIN — PANTOPRAZOLE SODIUM 40 MG: 40 TABLET, DELAYED RELEASE ORAL at 06:07

## 2024-02-26 RX ADMIN — BUPROPION HYDROCHLORIDE 300 MG: 300 TABLET, EXTENDED RELEASE ORAL at 08:58

## 2024-02-26 RX ADMIN — LOSARTAN POTASSIUM 50 MG: 50 TABLET, FILM COATED ORAL at 08:57

## 2024-02-26 RX ADMIN — TRAZODONE HYDROCHLORIDE 50 MG: 50 TABLET ORAL at 21:03

## 2024-02-26 RX ADMIN — DESVENLAFAXINE 100 MG: 50 TABLET, FILM COATED, EXTENDED RELEASE ORAL at 08:55

## 2024-02-26 RX ADMIN — ACETAMINOPHEN 975 MG: 325 TABLET ORAL at 08:57

## 2024-02-26 RX ADMIN — POTASSIUM CHLORIDE 40 MEQ: 1500 TABLET, EXTENDED RELEASE ORAL at 08:57

## 2024-02-26 RX ADMIN — METOPROLOL TARTRATE 100 MG: 100 TABLET, FILM COATED ORAL at 21:02

## 2024-02-26 ASSESSMENT — PAIN - FUNCTIONAL ASSESSMENT
PAIN_FUNCTIONAL_ASSESSMENT: 0-10

## 2024-02-26 ASSESSMENT — COGNITIVE AND FUNCTIONAL STATUS - GENERAL
WALKING IN HOSPITAL ROOM: A LOT
HELP NEEDED FOR BATHING: A LOT
CLIMB 3 TO 5 STEPS WITH RAILING: A LOT
MOVING FROM LYING ON BACK TO SITTING ON SIDE OF FLAT BED WITH BEDRAILS: A LOT
MOBILITY SCORE: 15
PERSONAL GROOMING: A LITTLE
STANDING UP FROM CHAIR USING ARMS: A LITTLE
MOVING TO AND FROM BED TO CHAIR: A LOT
TOILETING: A LOT
DAILY ACTIVITIY SCORE: 15
DRESSING REGULAR LOWER BODY CLOTHING: A LOT
MOBILITY SCORE: 12
DRESSING REGULAR UPPER BODY CLOTHING: A LITTLE
TURNING FROM BACK TO SIDE WHILE IN FLAT BAD: A LOT
EATING MEALS: A LITTLE
CLIMB 3 TO 5 STEPS WITH RAILING: A LOT
MOVING FROM LYING ON BACK TO SITTING ON SIDE OF FLAT BED WITH BEDRAILS: A LITTLE
TURNING FROM BACK TO SIDE WHILE IN FLAT BAD: A LITTLE
STANDING UP FROM CHAIR USING ARMS: A LOT
MOVING TO AND FROM BED TO CHAIR: A LOT
WALKING IN HOSPITAL ROOM: A LOT

## 2024-02-26 ASSESSMENT — ACTIVITIES OF DAILY LIVING (ADL)
BATHING_ASSISTANCE: MAXIMAL
LACK_OF_TRANSPORTATION: PATIENT UNABLE TO ANSWER

## 2024-02-26 ASSESSMENT — PAIN SCALES - GENERAL
PAINLEVEL_OUTOF10: 6
PAINLEVEL_OUTOF10: 5 - MODERATE PAIN
PAINLEVEL_OUTOF10: 9
PAINLEVEL_OUTOF10: 5 - MODERATE PAIN
PAINLEVEL_OUTOF10: 5 - MODERATE PAIN
PAINLEVEL_OUTOF10: 4
PAINLEVEL_OUTOF10: 5 - MODERATE PAIN
PAINLEVEL_OUTOF10: 5 - MODERATE PAIN

## 2024-02-26 ASSESSMENT — PAIN DESCRIPTION - ORIENTATION: ORIENTATION: RIGHT;LEFT

## 2024-02-26 ASSESSMENT — PAIN DESCRIPTION - LOCATION: LOCATION: BACK

## 2024-02-26 NOTE — H&P
History Of Present Illness  Silva Corrigan is a 68 y.o. female  Who presented to the emergency room after having a fall at home complaining from right knee pain and neck pain and back pain.  On presentation, blood pressure 110/92, heart rate 74, respiratory rate 16, afebrile, saturation oxygen 94% on room air.  Blood workup came back grossly within normal limits except for a potassium of 3.2.  Urinalysis showed some trace leukocyte esterase and bacteria.  Urine culture sent.  CT scan of the head and cervical spine did not show any acute fractures.  Knee x-ray did not show any acute fracture.  Patient was given in the emergency room IV fluids and then admitted to the medical service for further investigation management.  Patient is currently resting comfortably in her bed.  She is awake.  But a poor historian.  Denies having any pain at the time being.  No family at bedside.  Rest of the history was taken from the emergency room records.  Patient lives with her son.  Son noted that patient has been having a generalized progressive decline in her functional status over the past few weeks.  Has been requiring more care with her ADLs.  Mainly bathing showering getting dressed.  And she has been even requiring assistance with ambulating to the bathroom.  Her gait has become unsteady and she has been falling.  So she was brought to the emergency room         ROS  10 systems were reviewed and were negative except for those noted in the history of present illness.    Past Medical History  Past Medical History:   Diagnosis Date    Hyperlipidemia, unspecified 07/14/2022    Hyperlipidemia LDL goal <70    Hypertension     MI (myocardial infarction) (CMS/[facility]) 2014     Pertinent medical history also documented in my below narrative    Surgical History  Past Surgical History:   Procedure Laterality Date    OTHER SURGICAL HISTORY  06/02/2022    Knee surgery    OTHER SURGICAL HISTORY  06/02/2022    Mastectomy    OTHER SURGICAL HISTORY   06/02/2022    Knee replacement    OTHER SURGICAL HISTORY  06/02/2022    Carpal tunnel surgery      Pertinent surgical history also documented in my below narrative    Social History  She reports that she has been smoking cigarettes. She has never used smokeless tobacco. She reports that she does not currently use alcohol. She reports that she does not currently use drugs.    Family History  Family History   Problem Relation Name Age of Onset    No Known Problems Mother      No Known Problems Father          Allergies  Adhesive tape-silicones, Ibuprofen, Latex, and Penicillins    Medications Prior to Admission   Medication Sig Dispense Refill Last Dose    acetaminophen (Tylenol) 500 mg tablet Take 1 tablet (500 mg) by mouth every 6 hours if needed.       aspirin 81 mg chewable tablet Chew 1 tablet (81 mg).       blood sugar diagnostic (Blood Glucose Test) strip 100 strips 2 times a day. 100 strip 11     BLOOD SUGAR DIAGNOSTIC MISC by in vitro route.       blood sugar diagnostic strip        buPROPion XL (Wellbutrin XL) 300 mg 24 hr tablet TAKE 1 TABLET BY MOUTH DAILY 90 tablet 1     calcium carbonate-vitamin D3 600 mg-10 mcg (400 unit) chewable tablet Chew 1 tablet once daily.       cyanocobalamin (Vitamin B-12) 1,000 mcg tablet TAKE ONE TABLET BY MOUTH DAILY 90 tablet 1     desvenlafaxine 100 mg 24 hr tablet Take 1 tablet (100 mg) by mouth once daily. 90 tablet 1     esomeprazole (NexIUM) 40 mg DR capsule Take 1 capsule (40 mg) by mouth once daily in the morning. Take before meals. 90 capsule 1     fluticasone (Flonase) 50 mcg/actuation nasal spray Administer 2 sprays into each nostril once daily.       FreeStyle glucose monitoring kit Test BID 1 each 0     gabapentin (Neurontin) 300 mg capsule Take 1 capsule (300 mg) by mouth 3 times a day. 90 capsule 5     glipiZIDE XL (Glucotrol XL) 10 mg 24 hr tablet Take 1 tablet (10 mg) by mouth once daily. Do not crush, chew, or split. 30 tablet 11     hydrOXYzine pamoate  "(Vistaril) 25 mg capsule Take 1 capsule (25 mg) by mouth 3 times a day as needed for anxiety. 90 capsule 0     hydrOXYzine pamoate (Vistaril) 25 mg capsule Take 1 capsule (25 mg) by mouth 3 times a day as needed for anxiety. 30 capsule 3     lancets misc Test twice daily 100 each 11     loperamide (Imodium) 2 mg capsule Take by mouth.       losartan (Cozaar) 50 mg tablet Take 1 tablet (50 mg) by mouth once daily. 30 tablet 11     metoprolol tartrate (Lopressor) 100 mg tablet Take 1 tablet (100 mg) by mouth 2 times a day. 60 tablet 5     nitroglycerin (Nitrostat) 0.4 mg SL tablet Place 1 tablet (0.4 mg) under the tongue every 5 minutes if needed for chest pain. 30 tablet 1     rosuvastatin (Crestor) 5 mg tablet TAKE ONE TABLET BY MOUTH DAILY 90 tablet 1     traZODone (Desyrel) 50 mg tablet TAKE ONE TABLET BY MOUTH EVERY NIGHT AT BEDTIME 90 tablet 1         Last Recorded Vitals  Blood pressure 130/72, pulse 71, temperature 37.6 °C (99.7 °F), resp. rate 18, height 1.651 m (5' 5\"), weight 100 kg (221 lb), SpO2 91 %.    Physical Exam  Constitutional:       General: She is not in acute distress.     Appearance: She is not ill-appearing.      Comments: Awake alert and oriented x1   HENT:      Mouth/Throat:      Pharynx: Oropharynx is clear.   Eyes:      Pupils: Pupils are equal, round, and reactive to light.   Cardiovascular:      Rate and Rhythm: Normal rate and regular rhythm.      Heart sounds: Normal heart sounds.   Pulmonary:      Effort: No respiratory distress.      Breath sounds: Normal breath sounds. No wheezing or rhonchi.   Abdominal:      General: Abdomen is flat. Bowel sounds are normal. There is no distension.      Palpations: Abdomen is soft.      Tenderness: There is no abdominal tenderness.   Musculoskeletal:         General: No swelling.   Skin:     General: Skin is warm.   Neurological:      General: No focal deficit present.   Psychiatric:         Mood and Affect: Mood normal.           Relevant " Results  Results for orders placed or performed during the hospital encounter of 02/25/24 (from the past 24 hour(s))   Comprehensive metabolic panel   Result Value Ref Range    Glucose 161 (H) 74 - 99 mg/dL    Sodium 138 136 - 145 mmol/L    Potassium 3.2 (L) 3.5 - 5.3 mmol/L    Chloride 102 98 - 107 mmol/L    Bicarbonate 27 21 - 32 mmol/L    Anion Gap 12 10 - 20 mmol/L    Urea Nitrogen 12 6 - 23 mg/dL    Creatinine 0.66 0.50 - 1.05 mg/dL    eGFR >90 >60 mL/min/1.73m*2    Calcium 8.8 8.6 - 10.3 mg/dL    Albumin 3.6 3.4 - 5.0 g/dL    Alkaline Phosphatase 54 33 - 136 U/L    Total Protein 6.3 (L) 6.4 - 8.2 g/dL    AST 13 9 - 39 U/L    Bilirubin, Total 0.8 0.0 - 1.2 mg/dL    ALT 13 7 - 45 U/L   Troponin I, High Sensitivity   Result Value Ref Range    Troponin I, High Sensitivity 3 0 - 13 ng/L   CBC   Result Value Ref Range    WBC 10.9 4.4 - 11.3 x10*3/uL    nRBC 0.0 0.0 - 0.0 /100 WBCs    RBC 4.60 4.00 - 5.20 x10*6/uL    Hemoglobin 13.1 12.0 - 16.0 g/dL    Hematocrit 39.8 36.0 - 46.0 %    MCV 87 80 - 100 fL    MCH 28.5 26.0 - 34.0 pg    MCHC 32.9 32.0 - 36.0 g/dL    RDW 14.1 11.5 - 14.5 %    Platelets 253 150 - 450 x10*3/uL   Influenza A, and B PCR   Result Value Ref Range    Flu A Result Not Detected Not Detected    Flu B Result Not Detected Not Detected   Sars-CoV-2 PCR   Result Value Ref Range    Coronavirus 2019, PCR Not Detected Not Detected   Urinalysis with Reflex Culture and Microscopic   Result Value Ref Range    Color, Urine Yellow Straw, Yellow    Appearance, Urine Clear Clear    Specific Gravity, Urine 1.014 1.005 - 1.035    pH, Urine 6.0 5.0, 5.5, 6.0, 6.5, 7.0, 7.5, 8.0    Protein, Urine NEGATIVE NEGATIVE mg/dL    Glucose, Urine NEGATIVE NEGATIVE mg/dL    Blood, Urine NEGATIVE NEGATIVE    Ketones, Urine NEGATIVE NEGATIVE mg/dL    Bilirubin, Urine NEGATIVE NEGATIVE    Urobilinogen, Urine 4.0 (N) <2.0 mg/dL    Nitrite, Urine NEGATIVE NEGATIVE    Leukocyte Esterase, Urine TRACE (A) NEGATIVE   Microscopic Only,  Urine   Result Value Ref Range    WBC, Urine 1-5 1-5, NONE /HPF    RBC, Urine 1-2 NONE, 1-2, 3-5 /HPF    Squamous Epithelial Cells, Urine 1-9 (SPARSE) Reference range not established. /HPF    Bacteria, Urine 1+ (A) NONE SEEN /HPF        XR knee right 1-2 views    Result Date: 2/25/2024  STUDY: Knee Radiographs; 02/25/2024 at 1:48 PM INDICATION: Right knee pain. COMPARISON: None Available. ACCESSION NUMBER(S): [id number] ORDERING CLINICIAN: IJEOMA WALTER TECHNIQUE:  Two view(s) of the right knee. FINDINGS:  There is no displaced fracture.  The alignment is anatomic.  No soft tissue abnormality is seen.  There is no joint effusion.  Moderate patellofemoral degenerative change.    No fracture. Signed by Sanjeev Adames MD    XR pelvis 1-2 views    Result Date: 2/25/2024  STUDY: Pelvis Radiographs; 2/25/2024 1:49 PM INDICATION: Evaluate for injury post fall. COMPARISON: None Available. ACCESSION NUMBER(S): [id number] ORDERING CLINICIAN: IJEOMA WALTER TECHNIQUE:  One view(s) of the pelvis. FINDINGS:  The pelvic ring is intact.  There is no acute fracture.  Mild degenerative change of both hips.    No acute osseous abnormality. Signed by Jonathan Neri MD    XR lumbar spine 2-3 views    Result Date: 2/25/2024  STUDY: Lumbar Spine Radiographs; 2/25/2024 1:49 PM INDICATION: Back pain post fall. COMPARISON: 2/16/2023 XR lumbosacral. ACCESSION NUMBER(S): [id number] ORDERING CLINICIAN: IJEOMA WALTER TECHNIQUE:  Three view(s) of the lumbar spine. FINDINGS:  Minimal levoscoliosis.  Vertebral body height is normal without compression deformity.  There is no evidence of an acute fracture. Mild to moderate disc space narrowing throughout the lumbar spine. Mild facet arthrosis from L4 to S1.  Vascular calcifications noted.     No acute osseous abnormality. Signed by Jonathan Neri MD    CT cervical spine wo IV contrast    Result Date: 2/25/2024  Interpreted By:  Abdiaziz Cisse, STUDY: CT CERVICAL SPINE WO IV CONTRAST;   2/25/2024 2:06 pm   INDICATION: Signs/Symptoms:pain.   COMPARISON: None.   ACCESSION NUMBER(S): [id number]   ORDERING CLINICIAN: IJEOMA WALTER   TECHNIQUE: Contiguous axial CT sections are performed from the skullbase to the upper thoracic spine and supplemented with coronal and sagittal reformatted images.   FINDINGS: There is reversal the cervical lordosis. There is minimal degenerative posterior subluxation of C4 relative to C3 and C5 measuring 2 mm. The facet joints align normally.   There is multilevel cervical spondylosis which is greater at C4-5 and C6-7 with disc space narrowing and marginal spurring. The cervical vertebral body heights are maintained. The C1 ring is intact. There is no sign of acute cervical spine fracture. There is no bone destruction or aggressive periosteal reaction. No lytic or blastic lesion is identified. The visualized surrounding osseous structures are also intact.   The C1-2 relationship is within normal limits.   The C2-3 disc space level demonstrates moderate facet arthrosis on the left. There is no significant central canal or neural foraminal stenosis.   The C3-4 disc space level demonstrates moderate to severe facet arthrosis on the left. There is mild bulging disc and marginal osteophyte and mild uncovertebral arthrosis. There is mild to moderate narrowing of the left neural foramen. There is some mass effect upon the ventral subarachnoid space.   The C4-5 disc space level demonstrates moderate facet arthrosis on the left and mild facet arthrosis on the right. There is bulging disc and marginal osteophyte and mild bilateral uncovertebral arthrosis greater on the left. There is mild narrowing of the right neural foramen and moderate narrowing of the left neural foramen. There is mild central canal narrowing with mass effect upon the ventral subarachnoid space asymmetric to the left.   The C5-6 disc space level demonstrates moderate to severe facet arthrosis on the left and mild  facet arthrosis on the right. There is mild bulging disc with some effacement of the ventral subarachnoid space. There is severe narrowing of the left neural foramen and mild narrowing of the right neural foramen.   The C6-7 disc space level demonstrates mild bilateral facet arthrosis. There is bulging disc and marginal osteophyte with bilateral uncovertebral arthrosis. There is moderate bilateral neural foraminal narrowing and some effacement of the ventral subarachnoid space.   The C7-T1 disc space level reveals no significant central canal or neural foraminal stenosis. There is mild facet arthrosis on the right.   There is prevertebral soft tissue prominence though no retropharyngeal air. This in part is related to medial course of the internal carotid arteries. The surrounding visualized soft tissues are unremarkable.       No CT evidence of acute fracture or traumatic subluxation.   Multilevel cervical spondylosis with reversal the cervical lordosis.   Mild multilevel central canal narrowing secondary to bulging disc and marginal osteophyte.   Multilevel bilateral neural foraminal narrowing greater on the left as detailed above.   Signed by: Abdiaziz Cisse 2/25/2024 2:41 PM Dictation workstation:   [id number]    CT head wo IV contrast    Result Date: 2/25/2024  Interpreted By:  Abdiaziz Cisse, STUDY: CT HEAD WO IV CONTRAST;  2/25/2024 2:06 pm   INDICATION: Signs/Symptoms:fall.   COMPARISON: None.   ACCESSION NUMBER(S): [id number]   ORDERING CLINICIAN: IJEOMA WALTER   TECHNIQUE: Contiguous unenhanced axial CT sections are performed from the skull base to the vertex.   FINDINGS: The visualized osseous structures are intact. The visualized portions of the paranasal sinuses and mastoid air cells are clear.   There is mild generalized parenchymal volume loss with symmetric enlargement of the cortical sulci and CSF spaces. There is diffuse hypoattenuation in the cerebral white matter bilaterally  compatible small vessel ischemia. There is extension into the internal and external capsules anteriorly. There is superimposed 9 x 4 mm hypodensity in the left basal ganglia anteriorly centered at the anterior limb of the left internal capsule and abutting the caudate head and lentiform nuclei. The appearance suggests old lacunar infarct.   There are bilateral basal ganglia calcifications. There is no evidence of parenchymal hematoma or dense extra-axial fluid collection. There is no mass effect or midline shift. The gray matter/white-matter differentiation is preserved.       Age-related atrophy and diffuse small vessel ischemic changes of the cerebral white matter.   Chronic appearing lacunar infarct in the anterior left basal ganglia.   No CT evidence of acute intracranial hemorrhage or mass effect.   Incidental bilateral basal ganglia calcifications.   Signed by: Abdiaziz Cisse 2/25/2024 2:37 PM Dictation workstation:   [id number]        Assessment/Plan       68-year-old obese female with a past medical history of vascular dementia, coronary artery disease, depression, overactive bladder, urinary tract infections, GERD, esophageal polyp, diabetes mellitus, IBS, insomnia, restless leg syndrome, peripheral neuropathy, and hypertension who presented to the emergency room after having recurrent and frequent falls at home.  Only pertinent findings on blood workup was hypokalemia.    I will admit the patient to the observation medical service with vital signs monitoring.  Consult physical therapy and Occupational Therapy.  Pain control.  Consult  to assist with a safe discharge plan.  Resume home medications  SCDs for DVT prophylaxis  Patient is full code      (This note was generated with voice recognition software and may contain errors including spelling, grammar, syntax and misrecognition of what was dictated, that are not fully corrected)     Justin Marmolejo MD

## 2024-02-26 NOTE — PROGRESS NOTES
Physical Therapy    Physical Therapy Evaluation    Patient Name: Silva Corrigan  MRN: [id number]  Today's Date: 2/26/2024   Time Calculation  Start Time: 0821  Stop Time: 0843  Time Calculation (min): 22 min    Assessment/Plan   PT Assessment  PT Assessment Results: Decreased strength, Decreased endurance, Impaired balance, Decreased mobility, Impaired judgement, Decreased safety awareness, Pain  Rehab Prognosis: Good  Evaluation/Treatment Tolerance: Patient limited by pain, Patient limited by fatigue  End of Session Communication: Bedside nurse  Assessment Comment: Pt presents with decreased functional mobility secondary to weakness, decreased balance, decreased safety awareness, and high fall risk. Pt will benefit from skilled therapy in hospital setting.  End of Session Patient Position: Up in chair, Alarm on  IP OR SWING BED PT PLAN  Inpatient or Swing Bed: Inpatient  PT Plan  Treatment/Interventions: Bed mobility, Transfer training, Gait training, Stair training, Balance training, Neuromuscular re-education, Strengthening, Endurance training, Range of motion, Therapeutic exercise, Therapeutic activity, Home exercise program  PT Plan: Skilled PT  PT Frequency: 5 times per week  PT Discharge Recommendations: Moderate intensity level of continued care, 24 hr supervision due to cognition  PT Recommended Transfer Status: Assist x2, Assistive device  PT - OK to Discharge: Yes (PT eval complete, ok to d/c to next level of care with 24 hour assist when deemed medically appropriate.)    Subjective     Current Problem:  Patient Active Problem List   Diagnosis    CAD (coronary artery disease)    Depression, major, single episode, mild (CMS/HCC)    Diabetes mellitus, type 2 (CMS/HCC)    Esophageal polyp    GERD (gastroesophageal reflux disease)    Hyperlipidemia LDL goal <70    IBS (irritable bowel syndrome)    Insomnia    Peripheral neuropathy    Restless leg    Seasonal allergies    Polyarthropathy    Vascular dementia,  "uncomplicated (CMS/[facility])    Vitamin B12 deficiency    Obesity, morbid (CMS/[facility])    History of breast cancer    Overactive bladder    Primary hypertension    Fatigue    Balance disorder    Urinary incontinence    Dysuria    Acute cystitis without hematuria    Frequent falls    Falling       General Visit Information:  General  Reason for Referral: impaired mobility  Referred By: Tanya PT/OT  Past Medical History Relevant to Rehab: HTN, CAD, neuropathy, depression, DM, GERD, IBS, RLS, vascular dementia, insomnia  Family/Caregiver Present: No  Co-Treatment: OT  Co-Treatment Reason: Maximize pt safety while assessing discipline specific needs  Prior to Session Communication: Bedside nurse  Patient Position Received: Bed, 3 rail up, Alarm on  General Comment: Pt to ED 2/25 after a fall at home. 2/25 CTH, c spine (-) fx. XR R knee (-) acute.    Home Living:  Home Living  Home Living Comments: Pt lives in basement apartment with son in upper portion of house. 2 steps in from garage level to basement. Walk in shower, no seat or grab bars. Owns ww.    Prior Level of Function:  Prior Function Per Pt/Caregiver Report  Prior Function Comments: PTA pt stating independence with all ADLs and laundry. Has  every 2 weeks. Does not drive. Reports \"too many falls to count\". Per EMR, son stating pt has required increased assistance with mobility including ambulating to bathroom. Uses ww for mobility.    Precautions:  Precautions  Medical Precautions: Fall precautions  Precautions Comment: purewick external catheter (discontinued for mobility)    Vital Signs:     Objective     Pain:  Pain Assessment  Pain Assessment: 0-10  Pain Score: 5 - Moderate pain  Pain Type: Chronic pain  Pain Location: Back  Pain Orientation: Mid  Pain Interventions: Repositioned    Cognition:  Cognition  Overall Cognitive Status:  (decreased insight into deficits, decreased safety awareness, impulsive)  Orientation Level: Oriented X4    General " Assessments:      Activity Tolerance  Endurance: Decreased tolerance for upright activites     Strength  Strength Comments: BLE MMT 4-/5 overall        Postural Control  Posture Comment: Forward flexed posture  Static Sitting Balance  Static Sitting-Comment/Number of Minutes: Good  Dynamic Sitting Balance  Dynamic Sitting-Comments: Fair +  Static Standing Balance  Static Standing-Comment/Number of Minutes: Fair  Dynamic Standing Balance  Dynamic Standing-Comments: Fair -    Functional Assessments:     Bed Mobility  Bed Mobility: Yes  Bed Mobility 1  Bed Mobility 1: Supine to sitting  Level of Assistance 1: Moderate assistance (x2 person)  Bed Mobility Comments 1: Cues for hand placement on bed rails, for log roll technique, HOB elevated, assist for trunk control, assist for bringing BLE's over EOB, use of bed pad to scoot towards EOB  Transfers  Transfer: Yes  Transfer 1  Technique 1: Sit to stand, Stand to sit  Transfer Device 1: Walker, Gait belt  Transfer Level of Assistance 1: Moderate assistance (x2 person)  Trials/Comments 1: x2 reps, assist for balance upon stand, pt impulsive, cues for ww management  Ambulation/Gait Training  Ambulation/Gait Training Performed: Yes  Ambulation/Gait Training 1  Surface 1: Level tile  Device 1: Rolling walker  Gait Support Devices: Gait belt  Assistance 1: Moderate assistance  Quality of Gait 1: Forward flexed posture, Inconsistent stride length  Comments/Distance (ft) 1: Pt ambulates ~15' in room, assist for ww management, impulsive and demos knees buckling due to back pain, increased UE support on ww          Extremity/Trunk Assessments:        RLE   RLE : Within Functional Limits  LLE   LLE : Within Functional Limits    Outcome Measures:  [facility] Basic Mobility  Turning from your back to your side while in a flat bed without using bedrails: A lot  Moving from lying on your back to sitting on the side of a flat bed without using bedrails: A lot  Moving to and from bed to chair  (including a wheelchair): A lot  Standing up from a chair using your arms (e.g. wheelchair or bedside chair): A lot  To walk in hospital room: A lot  Climbing 3-5 steps with railing: A lot  Basic Mobility - Total Score: 12                            Goals:  Encounter Problems       Encounter Problems (Active)       PT Problem       Pt will demonstrate sup > sit and sit > sup bed mobility mod I (Progressing)       Start:  02/26/24    Expected End:  03/11/24            Pt will demo sit > stand and stand > sit transfer with ww and SBA  (Progressing)       Start:  02/26/24    Expected End:  03/11/24            Pt will ambulate 50' with ww and SBA, without LOB  (Progressing)       Start:  02/26/24    Expected End:  03/11/24            Pt will demonstrate ability to tolerate 8 minutes of seated or standing therapeutic exercise with 4 or less rest breaks to demonstrate improved activity tolerance.  (Progressing)       Start:  02/26/24    Expected End:  03/11/24               Pain - Adult            Education Documentation  Mobility Training, taught by Pearl Cuellar, PT at 2/26/2024  9:27 AM.  Learner: Patient  Readiness: Acceptance  Method: Explanation  Response: Needs Reinforcement    Education Comments  No comments found.

## 2024-02-26 NOTE — PROGRESS NOTES
02/26/24 1315   Discharge Planning   Living Arrangements Children   Support Systems Children   Assistance Needed walker, assist x 1-2   Type of Residence Private residence   Number of Stairs to Enter Residence 2   Do you have animals or pets at home? Yes   Type of Animals or Pets 4 cats, 3 dogs   Who is requesting discharge planning? Provider   Home or Post Acute Services Post acute facilities (Rehab/SNF/etc)   Type of Post Acute Facility Services Long term care   Patient expects to be discharged to: SNF   Does the patient need discharge transport arranged? Yes   RoundTrip coordination needed? Yes   Has discharge transport been arranged? No   Financial Resource Strain   How hard is it for you to pay for the very basics like food, housing, medical care, and heating? Pt Unable   Housing Stability   In the last 12 months, was there a time when you were not able to pay the mortgage or rent on time? Pt Unable   In the last 12 months, how many places have you lived? 2   In the last 12 months, was there a time when you did not have a steady place to sleep or slept in a shelter (including now)? Pt Unable   Transportation Needs   In the past 12 months, has lack of transportation kept you from medical appointments or from getting medications? Pt Unable   In the past 12 months, has lack of transportation kept you from meetings, work, or from getting things needed for daily living? Pt Unable   Patient Choice   Provider Choice list and CMS website (https://medicare.gov/care-compare#search) for post-acute Quality and Resource Measure Data were provided and reviewed with: Family   Patient / Family choosing to utilize agency / facility established prior to hospitalization Yes     Care Transitions: Patient reviewed during care round meeting this AM. ADOD 24 hours. Met with patient at bedside. Alert, confused at times. Demographics and contacts verified. States she lives with her son and son in law and feels safe at their home.  She is an active patient with her PCP Dr. Mendoza. She uses [facility] in [place]. States she is independent with ADL's but does not drive. Her son Amol is her mode of transportation. States she has a cane and walker but does not always use it. Discussed discharge plans. States her son is currently visiting a SNF for placement but she's not sure which facility. Permission to call son to discuss placement. Maia Walker, RN    -1210 Son Amol Rivera at bedside and requested TCC. Son verified above information since patient has some confusion. Stated he spoke/met with Almaz chu at [facility]. He is looking for long term placement in the dementia unit. Stated they have a bed there for her. Will send a referral through [facility]. Message left for Almaz @ [facility] to inquire if patient will be coming as skilled and then long term. Care team to follow. Maia Walker RN/TCC    -1500 Spoke to Almaz chu @ [facility]. They are able to accept her in the dementia unit as skilled for therapy. Once therapy is completed she will be private pay, then apply for long term care Medicaid. Verified through Eliana Lara   that patient does have Medicaid with Q1 and Q2 benefits only currently and not traditional Medicaid. Will update Almaz at [facility] through [facility]. Requested precert be submitted per DSC auth team. Spoke to son Amol to update. Care team to follow for SNF auth. Maia Walker RN/TCC

## 2024-02-26 NOTE — PROGRESS NOTES
"Nutrition Initial Assessment:   Nutrition Assessment    Reason for Assessment: Admission nursing screening    Patient is a 68 y.o. female presenting with: fall     2/26/24 patient seen - stated eats well at home but is picky.  Does not monitor blood sugar as does not have a glucometer but her doctor was to get one.  Plans for placement at discharge.         Nutrition History:  Energy Intake: Good > 75 %  Food and Nutrient History: she and her son cooks at home, is a picky eater  Vitamin/Herbal Supplement Use: unsure  Food Allergies/Intolerances:  None  GI Symptoms: None  Oral Problems: None       Anthropometrics:  Height: 165.1 cm (5' 5\")   Weight: 100 kg (221 lb)   BMI (Calculated): 36.78  IBW/kg (Dietitian Calculated): 56.8 kg  Percent of IBW: 176 %  Adjusted Body Weight (kg): 68 kg    Weight History:     Weight Change %:  Weight History / % Weight Change: 219# 7/31/23 -  Significant Weight Loss: No  Significant Weight Gain: Non-fluid related    Nutrition Focused Physical Exam Findings:  defer: not indicated    Nutrition Significant Labs:  BG POCT trend:   Results from last 7 days   Lab Units 02/26/24  1133 02/26/24  0739   POCT GLUCOSE mg/dL 165* 74          Dietary Orders (From admission, onward)       Start     Ordered    02/26/24 0713  Adult diet Carb Controlled; 75 gram carb/meal, 45 gram Carb evening snack  Diet effective now        Question Answer Comment   Diet type Carb Controlled    Carb diet selection: 75 gram carb/meal, 45 gram Carb evening snack        02/26/24 0712    02/25/24 1847  May Participate in Room Service With Assistance  Once        Question:  .  Answer:  Yes    02/25/24 1847                     Estimated Needs:   Total Energy Estimated Needs (kCal): 1836 kCal  Method for Estimating Needs: 25-30 kcal/kg Adjusted IBW 68 kg = 5392-6506 kcal  Total Protein Estimated Needs (g): 68 g  Method for Estimating Needs: 0.8-1 gm/kg = 54-68  Total Fluid Estimated Needs (mL): 1700 mL           Nutrition " Diagnosis   Malnutrition Diagnosis  Patient has Malnutrition Diagnosis: No    Nutrition Diagnosis  Patient has Nutrition Diagnosis: Yes  Diagnosis Status (1): New  Nutrition Diagnosis 1: Obese  Related to (1): excess calorie intake  As Evidenced by (1): BMI 36.78 kg/m2       Nutrition Interventions/Recommendations         Nutrition Prescription:  Individualized Nutrition Prescription Provided for : Carb controlled diet        Nutrition Interventions:   Interventions: Meals and snacks  Meals and Snacks: Carbohydrate-modified diet  Goal: Provide diet ordered for history of DM    Collaboration and Referral of Nutrition Care: Collaboration by nutrition professional with other providers  Goal: CNP    Nutrition Education:  Educated about meal service  Nutrition Monitoring and Evaluation   Food/Nutrient Related History Monitoring  Monitoring and Evaluation Plan: Amount of food  Amount of Food: Estimated amout of food  Criteria: Will consume >50% meals       Biochemical Data, Medical Tests and Procedures  Monitoring and Evaluation Plan: Glucose/endocrine profile  Glucose/Endocrine Profile: Glucose, casual  Criteria: 100-140 mg/dl       Time Spent/Follow-up Reminder:   Time Spent (min): 30 minutes  Last Date of Nutrition Visit: 02/26/24  Nutrition Follow-Up Needed?: 7-10 days    Evi Hagan RDN, LD

## 2024-02-26 NOTE — TELEPHONE ENCOUNTER
Patients son stated she has fallen 6x in the last 5 days. She is currently in the hospital due to the falls. No fractures. He is meeting with [facility] at 11am today to set up her being transferred to long term care.

## 2024-02-26 NOTE — PROGRESS NOTES
"Silva Corrigan is a 68 y.o. female on day 1 of admission presenting with Frequent falls.    Subjective   Patient is sitting up in chair and states \"I am full of stress months.\"  Asked patient to explain and she is very vague and complains of back pain but unable to describe.  Able to rates 9 out of 10.  She answers questions appropriately and is alert and oriented x 3       Objective     Last Recorded Vitals  /75 (BP Location: Left arm, Patient Position: Sitting)   Pulse 70   Temp 36.6 °C (97.9 °F)   Resp 18   Wt 100 kg (221 lb)   SpO2 91%   Intake/Output last 3 Shifts:    Intake/Output Summary (Last 24 hours) at 2/26/2024 1535  Last data filed at 2/26/2024 1300  Gross per 24 hour   Intake 1140 ml   Output 950 ml   Net 190 ml       Admission Weight  Weight: 100 kg (221 lb) (02/25/24 1327)    Daily Weight  02/25/24 : 100 kg (221 lb)    Image Results  ECG 12 lead  Normal sinus rhythm  Cannot rule out Anterior infarct , age undetermined  Abnormal ECG  No previous ECGs available      Physical Exam  General Appearance: AAO x 3, not in acute distress  Skin: skin color pink, warm, and dry; no suspicious rashes or lesions  Eyes : PERRL, EOM's intact  ENT: mucous membranes pink and moist  Neck: normocephalic  Respiratory: lungs clear to auscultation anteriorly; no wheezing, rhonchi, or crackles.   Heart: regular rate and rhythm.   Abdomen: Nondistended, positive bowel sounds x4, soft,  nontender  Extremities: no edema   Peripheral pulses: normal x4 extremities  Neuro: alert, coherent and conversant, no focal motor deficits    Relevant Results  Results for orders placed or performed during the hospital encounter of 02/25/24 (from the past 24 hour(s))   Urinalysis with Reflex Culture and Microscopic   Result Value Ref Range    Color, Urine Yellow Straw, Yellow    Appearance, Urine Clear Clear    Specific Gravity, Urine 1.014 1.005 - 1.035    pH, Urine 6.0 5.0, 5.5, 6.0, 6.5, 7.0, 7.5, 8.0    Protein, Urine NEGATIVE " NEGATIVE mg/dL    Glucose, Urine NEGATIVE NEGATIVE mg/dL    Blood, Urine NEGATIVE NEGATIVE    Ketones, Urine NEGATIVE NEGATIVE mg/dL    Bilirubin, Urine NEGATIVE NEGATIVE    Urobilinogen, Urine 4.0 (N) <2.0 mg/dL    Nitrite, Urine NEGATIVE NEGATIVE    Leukocyte Esterase, Urine TRACE (A) NEGATIVE   Extra Urine Gray Tube   Result Value Ref Range    Extra Tube Hold for add-ons.    Microscopic Only, Urine   Result Value Ref Range    WBC, Urine 1-5 1-5, NONE /HPF    RBC, Urine 1-2 NONE, 1-2, 3-5 /HPF    Squamous Epithelial Cells, Urine 1-9 (SPARSE) Reference range not established. /HPF    Bacteria, Urine 1+ (A) NONE SEEN /HPF   Urine Culture    Specimen: Clean Catch/Voided; Urine   Result Value Ref Range    Urine Culture No growth    CBC   Result Value Ref Range    WBC 8.6 4.4 - 11.3 x10*3/uL    nRBC 0.0 0.0 - 0.0 /100 WBCs    RBC 4.29 4.00 - 5.20 x10*6/uL    Hemoglobin 12.2 12.0 - 16.0 g/dL    Hematocrit 37.0 36.0 - 46.0 %    MCV 86 80 - 100 fL    MCH 28.4 26.0 - 34.0 pg    MCHC 33.0 32.0 - 36.0 g/dL    RDW 14.5 11.5 - 14.5 %    Platelets 256 150 - 450 x10*3/uL   Renal Function Panel   Result Value Ref Range    Glucose 58 (L) 74 - 99 mg/dL    Sodium 141 136 - 145 mmol/L    Potassium 2.8 (LL) 3.5 - 5.3 mmol/L    Chloride 106 98 - 107 mmol/L    Bicarbonate 29 21 - 32 mmol/L    Anion Gap 9 (L) 10 - 20 mmol/L    Urea Nitrogen 9 6 - 23 mg/dL    Creatinine 0.66 0.50 - 1.05 mg/dL    eGFR >90 >60 mL/min/1.73m*2    Calcium 8.7 8.6 - 10.3 mg/dL    Phosphorus 4.1 2.5 - 4.9 mg/dL    Albumin 3.3 (L) 3.4 - 5.0 g/dL   Magnesium   Result Value Ref Range    Magnesium 1.85 1.60 - 2.40 mg/dL   POCT GLUCOSE   Result Value Ref Range    POCT Glucose 74 74 - 99 mg/dL   POCT GLUCOSE   Result Value Ref Range    POCT Glucose 165 (H) 74 - 99 mg/dL     Scheduled medications  acetaminophen, 975 mg, oral, TID  aspirin, 81 mg, oral, Daily  atorvastatin, 20 mg, oral, Nightly  buPROPion XL, 300 mg, oral, Daily  desvenlafaxine, 100 mg, oral,  Daily  fentaNYL, 50 mcg, intravenous, Once  insulin lispro, 0-10 Units, subcutaneous, TID with meals  losartan, 50 mg, oral, Daily  metoprolol tartrate, 100 mg, oral, BID  ondansetron, 4 mg, intravenous, Once  pantoprazole, 40 mg, oral, Daily before breakfast  traZODone, 50 mg, oral, Nightly      Continuous medications     PRN medications  PRN medications: dextrose 10 % in water (D10W), dextrose, glucagon, hydrOXYzine pamoate, morphine, nitroglycerin, traMADol     Assessment/Plan      Principal Problem:    Frequent falls  Active Problems:    Falling    68-year-old obese female with a past medical history of vascular dementia, coronary artery disease, depression, overactive bladder, urinary tract infections, GERD, esophageal polyp, diabetes mellitus, IBS, insomnia, restless leg syndrome, peripheral neuropathy, and hypertension who presented to the emergency room after having recurrent and frequent falls at home. Only pertinent findings on blood workup was hypokalemia.     Plan:    Falls/weakness  -PT and OT consulted.  AM-PAC score 12.  Patient would benefit from rehab placement    Hypokalemia  -Potassium replaced for K of 2.8.  Will repeat level to see if additional replacement needed    Back pain  -Continue pain management  -PT/OT following    Hyperglycemia  -Dietitian following.  Blood sugar improved after patient received food    CAD  -Continue aspirin    Hyperlipidemia  -Continue atorvastatin    Depression  -Continue bupropion, Pristiq    Hypertension  -Continue losartan and metoprolol    disCharge disposition: Anticipate discharge to rehab when pre-CERT obtained.  Patient medically stable    Tianna Hayes, APRN-CNP

## 2024-02-27 VITALS
WEIGHT: 221 LBS | DIASTOLIC BLOOD PRESSURE: 91 MMHG | SYSTOLIC BLOOD PRESSURE: 172 MMHG | BODY MASS INDEX: 36.82 KG/M2 | HEART RATE: 68 BPM | RESPIRATION RATE: 18 BRPM | TEMPERATURE: 97.7 F | HEIGHT: 65 IN | OXYGEN SATURATION: 96 %

## 2024-02-27 PROBLEM — E87.6 HYPOKALEMIA: Status: ACTIVE | Noted: 2024-02-27

## 2024-02-27 PROBLEM — E87.6 HYPOKALEMIA: Status: RESOLVED | Noted: 2024-02-27 | Resolved: 2024-02-27

## 2024-02-27 LAB
ALBUMIN SERPL BCP-MCNC: 3.1 G/DL (ref 3.4–5)
ANION GAP SERPL CALC-SCNC: 11 MMOL/L (ref 10–20)
BUN SERPL-MCNC: 13 MG/DL (ref 6–23)
CALCIUM SERPL-MCNC: 8.9 MG/DL (ref 8.6–10.3)
CHLORIDE SERPL-SCNC: 107 MMOL/L (ref 98–107)
CO2 SERPL-SCNC: 26 MMOL/L (ref 21–32)
CREAT SERPL-MCNC: 0.74 MG/DL (ref 0.5–1.05)
EGFRCR SERPLBLD CKD-EPI 2021: 88 ML/MIN/1.73M*2
ERYTHROCYTE [DISTWIDTH] IN BLOOD BY AUTOMATED COUNT: 14.2 % (ref 11.5–14.5)
GLUCOSE BLD MANUAL STRIP-MCNC: 116 MG/DL (ref 74–99)
GLUCOSE BLD MANUAL STRIP-MCNC: 146 MG/DL (ref 74–99)
GLUCOSE SERPL-MCNC: 153 MG/DL (ref 74–99)
HCT VFR BLD AUTO: 36.4 % (ref 36–46)
HGB BLD-MCNC: 12 G/DL (ref 12–16)
MAGNESIUM SERPL-MCNC: 1.77 MG/DL (ref 1.6–2.4)
MCH RBC QN AUTO: 28.5 PG (ref 26–34)
MCHC RBC AUTO-ENTMCNC: 33 G/DL (ref 32–36)
MCV RBC AUTO: 87 FL (ref 80–100)
NRBC BLD-RTO: 0 /100 WBCS (ref 0–0)
PHOSPHATE SERPL-MCNC: 4.3 MG/DL (ref 2.5–4.9)
PLATELET # BLD AUTO: 271 X10*3/UL (ref 150–450)
POTASSIUM SERPL-SCNC: 3.5 MMOL/L (ref 3.5–5.3)
RBC # BLD AUTO: 4.21 X10*6/UL (ref 4–5.2)
SARS-COV-2 RNA RESP QL NAA+PROBE: NOT DETECTED
SODIUM SERPL-SCNC: 140 MMOL/L (ref 136–145)
WBC # BLD AUTO: 7 X10*3/UL (ref 4.4–11.3)

## 2024-02-27 PROCEDURE — 2500000004 HC RX 250 GENERAL PHARMACY W/ HCPCS (ALT 636 FOR OP/ED): Performed by: INTERNAL MEDICINE

## 2024-02-27 PROCEDURE — 87635 SARS-COV-2 COVID-19 AMP PRB: CPT

## 2024-02-27 PROCEDURE — 2500000002 HC RX 250 W HCPCS SELF ADMINISTERED DRUGS (ALT 637 FOR MEDICARE OP, ALT 636 FOR OP/ED): Performed by: STUDENT IN AN ORGANIZED HEALTH CARE EDUCATION/TRAINING PROGRAM

## 2024-02-27 PROCEDURE — 2500000001 HC RX 250 WO HCPCS SELF ADMINISTERED DRUGS (ALT 637 FOR MEDICARE OP): Performed by: STUDENT IN AN ORGANIZED HEALTH CARE EDUCATION/TRAINING PROGRAM

## 2024-02-27 PROCEDURE — 97116 GAIT TRAINING THERAPY: CPT | Mod: GP,CQ

## 2024-02-27 PROCEDURE — G0378 HOSPITAL OBSERVATION PER HR: HCPCS

## 2024-02-27 PROCEDURE — 80069 RENAL FUNCTION PANEL: CPT | Performed by: STUDENT IN AN ORGANIZED HEALTH CARE EDUCATION/TRAINING PROGRAM

## 2024-02-27 PROCEDURE — 96376 TX/PRO/DX INJ SAME DRUG ADON: CPT | Mod: 59

## 2024-02-27 PROCEDURE — 36415 COLL VENOUS BLD VENIPUNCTURE: CPT | Performed by: STUDENT IN AN ORGANIZED HEALTH CARE EDUCATION/TRAINING PROGRAM

## 2024-02-27 PROCEDURE — 85027 COMPLETE CBC AUTOMATED: CPT | Performed by: STUDENT IN AN ORGANIZED HEALTH CARE EDUCATION/TRAINING PROGRAM

## 2024-02-27 PROCEDURE — 82947 ASSAY GLUCOSE BLOOD QUANT: CPT

## 2024-02-27 PROCEDURE — 97535 SELF CARE MNGMENT TRAINING: CPT | Mod: GO

## 2024-02-27 PROCEDURE — 83735 ASSAY OF MAGNESIUM: CPT | Performed by: STUDENT IN AN ORGANIZED HEALTH CARE EDUCATION/TRAINING PROGRAM

## 2024-02-27 PROCEDURE — 99231 SBSQ HOSP IP/OBS SF/LOW 25: CPT

## 2024-02-27 RX ORDER — ATORVASTATIN CALCIUM 20 MG/1
20 TABLET, FILM COATED ORAL NIGHTLY
Start: 2024-02-27

## 2024-02-27 RX ORDER — TRAMADOL HYDROCHLORIDE 50 MG/1
50 TABLET ORAL EVERY 6 HOURS PRN
Qty: 10 TABLET | Refills: 0 | Status: SHIPPED
Start: 2024-02-27

## 2024-02-27 RX ADMIN — METOPROLOL TARTRATE 100 MG: 100 TABLET, FILM COATED ORAL at 09:04

## 2024-02-27 RX ADMIN — ACETAMINOPHEN 975 MG: 325 TABLET ORAL at 09:04

## 2024-02-27 RX ADMIN — DESVENLAFAXINE 100 MG: 50 TABLET, FILM COATED, EXTENDED RELEASE ORAL at 09:04

## 2024-02-27 RX ADMIN — ASPIRIN 81 MG CHEWABLE TABLET 81 MG: 81 TABLET CHEWABLE at 09:04

## 2024-02-27 RX ADMIN — PANTOPRAZOLE SODIUM 40 MG: 40 TABLET, DELAYED RELEASE ORAL at 06:44

## 2024-02-27 RX ADMIN — LOSARTAN POTASSIUM 50 MG: 50 TABLET, FILM COATED ORAL at 09:04

## 2024-02-27 RX ADMIN — BUPROPION HYDROCHLORIDE 300 MG: 300 TABLET, EXTENDED RELEASE ORAL at 09:04

## 2024-02-27 RX ADMIN — MORPHINE SULFATE 2 MG: 2 INJECTION, SOLUTION INTRAMUSCULAR; INTRAVENOUS at 01:09

## 2024-02-27 ASSESSMENT — COGNITIVE AND FUNCTIONAL STATUS - GENERAL
DRESSING REGULAR UPPER BODY CLOTHING: A LITTLE
PERSONAL GROOMING: A LITTLE
CLIMB 3 TO 5 STEPS WITH RAILING: A LITTLE
CLIMB 3 TO 5 STEPS WITH RAILING: A LITTLE
MOVING FROM LYING ON BACK TO SITTING ON SIDE OF FLAT BED WITH BEDRAILS: A LITTLE
MOBILITY SCORE: 22
HELP NEEDED FOR BATHING: A LOT
DAILY ACTIVITIY SCORE: 16
WALKING IN HOSPITAL ROOM: A LITTLE
TURNING FROM BACK TO SIDE WHILE IN FLAT BAD: A LITTLE
WALKING IN HOSPITAL ROOM: A LITTLE
DAILY ACTIVITIY SCORE: 22
MOBILITY SCORE: 20
HELP NEEDED FOR BATHING: A LITTLE
DRESSING REGULAR LOWER BODY CLOTHING: A LITTLE
EATING MEALS: A LITTLE
TOILETING: A LITTLE
TOILETING: A LOT

## 2024-02-27 ASSESSMENT — PAIN SCALES - GENERAL
PAINLEVEL_OUTOF10: 0 - NO PAIN
PAINLEVEL_OUTOF10: 8

## 2024-02-27 ASSESSMENT — PAIN - FUNCTIONAL ASSESSMENT
PAIN_FUNCTIONAL_ASSESSMENT: 0-10

## 2024-02-27 ASSESSMENT — ACTIVITIES OF DAILY LIVING (ADL): HOME_MANAGEMENT_TIME_ENTRY: 26

## 2024-02-27 NOTE — PROGRESS NOTES
Occupational Therapy    OT Treatment    Patient Name: Silva Corrigan  MRN: [id number]  Today's Date: 2/27/2024  Time Calculation  Start Time: 1112  Stop Time: 1138  Time Calculation (min): 26 min         Assessment:  OT Assessment: pt with significant imporvement in balance during functional tasks.  pt with improved command following yet still needs constant cues for safety throughout ADL.  Continued OT services warranted in order to return safely to PLOF and prevent future falls     Plan:  Treatment Interventions: ADL retraining, Functional transfer training, Endurance training, Neuromuscular reeducation  OT Frequency: 4 times per week  OT Discharge Recommendations: Moderate intensity level of continued care  OT Recommended Transfer Status: Assist of 2 (due to unpredictable behaviors and poor safety)  OT - OK to Discharge: Yes (ok to DC once medically stable)  Treatment Interventions: ADL retraining, Functional transfer training, Endurance training, Neuromuscular reeducation    Subjective   Previous Visit Info:     General:  General  Reason for Referral: 68 year old female admitted for frequent falls at home with family unable to care for her due to significant need for increased help in ADLs and mobility.  CT head, CT cspine, amnd xray right knee all negative for acute changes.  Referred By: Tanya PT/OT  Family/Caregiver Present: No  Co-Treatment: PT  Co-Treatment Reason: Maximize pt safety while assessing discipline specific needs  Patient Position Received: Alarm on, Up in chair  Precautions: fall risk    Pain:  Pain Assessment  Pain Assessment: 0-10  Pain Score: 0 - No pain    Objective      Activities of Daily Living: Grooming  Grooming Level of Assistance: Contact guard, Minimal verbal cues  Grooming Where Assessed: Standing sinkside  Grooming Comments: pt stands at sink for oral hygiene.  pt needs min VCs for sequencing throughout.  standing balance unsupported much improved with overall CGA.         BYRON  Dressing  LE Dressing: Yes  Pants Level of Assistance: Minimal verbal cues, Minimum assistance  LE Dressing Where Assessed: Chair  LE Dressing Comments: pt performs sit to stands during LB Als with CG/min A and VCs for hand placement and safety       Functional Standing Tolerance:  Time: 34 minutes  Activity: brushing teeth  Bed Mobility/Transfers: Transfer 1  Transfer From 1: Sit to, Stand to  Transfer to 1: Sit, Stand  Technique 1: Sit to stand, Stand to sit  Transfer Device 1: Walker, Gait belt  Transfer Level of Assistance 1: Minimum assistance      Ambulation/Gait Training:  Ambulation/Gait Training  Ambulation/Gait Training Performed: Yes  Ambulation/Gait Training 1  Surface 1: Level tile  Device 1: Rolling walker  Gait Support Devices: Gait belt  Assistance 1: Contact guard, Minimum assistance  Comments/Distance (ft) 1: mobility around room to and from bathroom with FWW with min A.  pt needs constant reminders to keep body inside of walker  Standing Balance:  standing at sink for ADLs with CG/min A         Outcome Measures:[facility] Daily Activity  Putting on and taking off regular lower body clothing: A little  Bathing (including washing, rinsing, drying): A lot  Putting on and taking off regular upper body clothing: A little  Toileting, which includes using toilet, bedpan or urinal: A lot  Taking care of personal grooming such as brushing teeth: A little  Eating Meals: A little  Daily Activity - Total Score: 16        Education Documentation  Body Mechanics, taught by Evi Valentine OT at 2/27/2024  1:35 PM.  Learner: Patient  Readiness: Eager  Method: Explanation, Demonstration  Response: Needs Reinforcement, Demonstrated Understanding    ADL Training, taught by Evi Valentine OT at 2/27/2024  1:35 PM.  Learner: Patient  Readiness: Eager  Method: Explanation, Demonstration  Response: Needs Reinforcement, Demonstrated Understanding    Education Comments  No comments found.             Goals:  Encounter Problems        Encounter Problems (Active)       ADLs       Patient will perform LB bathing  with contact guard assist level of assistance . (Progressing)       Start:  02/26/24    Expected End:  03/11/24            Patient with complete lower body dressing with contact guard assist level of assistance donning all LE clothes  with PRN adaptive equipment (Progressing)       Start:  02/26/24    Expected End:  03/11/24            Patient will complete toileting including hygiene clothing management/hygiene with stand by assist level of assistance . (Progressing)       Start:  02/26/24    Expected End:  03/11/24               BALANCE       Pt will maintain dynamic standing balance during ADL task with stand by assist level of assistance in order to demonstrate decreased risk of falling and improved postural control. (Progressing)       Start:  02/26/24    Expected End:  03/11/24               MOBILITY       Patient will perform Functional mobility  Household distances with stand by assist level of assistance and least restrictive device in order to improve safety and functional mobility. (Progressing)       Start:  02/26/24    Expected End:  03/11/24

## 2024-02-27 NOTE — DISCHARGE INSTRUCTIONS
Discharge:    Discharge to [facility]  Resume home meds. Started on atorvastatin daily. Tramadol 50 mg as needed for pain  Follow-up with PCP in 2 weeks    Thank you for allowing  [facility] to participate in your care. Return to the ER  if symptoms worsen

## 2024-02-27 NOTE — NURSING NOTE
Discharge Note: 2/27/2024 1455 Discharged via wheelchair by [facility] to SNF, paperwork packet sent with transporter, personal belongings taken by transporter, no distress noted, no complaints voiced. Kane LAZARO

## 2024-02-27 NOTE — DISCHARGE SUMMARY
Discharge Diagnosis  Frequent falls    Issues Requiring Follow-Up  Frequent falls    Discharge Meds     Your medication list        START taking these medications        Instructions Last Dose Given Next Dose Due   atorvastatin 20 mg tablet  Commonly known as: Lipitor      Take 1 tablet (20 mg) by mouth once daily at bedtime.       traMADol 50 mg tablet  Commonly known as: Ultram      Take 1 tablet (50 mg) by mouth every 6 hours if needed for moderate pain (4 - 6).              CONTINUE taking these medications        Instructions Last Dose Given Next Dose Due   acetaminophen 500 mg tablet  Commonly known as: Tylenol           aspirin 81 mg chewable tablet           Blood glucose monitoring meter kit kit      Test BID       blood sugar diagnostic strip           BLOOD SUGAR DIAGNOSTIC MISC           Blood Glucose Test strip  Generic drug: blood sugar diagnostic      100 strips 2 times a day.       buPROPion  mg 24 hr tablet  Commonly known as: Wellbutrin XL      TAKE 1 TABLET BY MOUTH DAILY       calcium carbonate-vitamin D3 600 mg-10 mcg (400 unit) chewable tablet           cyanocobalamin 1,000 mcg tablet  Commonly known as: Vitamin B-12      TAKE ONE TABLET BY MOUTH DAILY       desvenlafaxine 100 mg 24 hr tablet  Commonly known as: Pristiq      Take 1 tablet (100 mg) by mouth once daily.       esomeprazole 40 mg DR capsule  Commonly known as: NexIUM      Take 1 capsule (40 mg) by mouth once daily in the morning. Take before meals.       fluticasone 50 mcg/actuation nasal spray  Commonly known as: Flonase           gabapentin 300 mg capsule  Commonly known as: Neurontin      Take 1 capsule (300 mg) by mouth 3 times a day.       glipiZIDE XL 10 mg 24 hr tablet  Commonly known as: Glucotrol XL      Take 1 tablet (10 mg) by mouth once daily. Do not crush, chew, or split.       hydrOXYzine pamoate 25 mg capsule  Commonly known as: Vistaril      Take 1 capsule (25 mg) by mouth 3 times a day as needed for  anxiety.       hydrOXYzine pamoate 25 mg capsule  Commonly known as: Vistaril      Take 1 capsule (25 mg) by mouth 3 times a day as needed for anxiety.       lancets misc      Test twice daily       loperamide 2 mg capsule  Commonly known as: Imodium           losartan 50 mg tablet  Commonly known as: Cozaar      Take 1 tablet (50 mg) by mouth once daily.       metoprolol tartrate 100 mg tablet  Commonly known as: Lopressor      Take 1 tablet (100 mg) by mouth 2 times a day.       nitroglycerin 0.4 mg SL tablet  Commonly known as: Nitrostat      Place 1 tablet (0.4 mg) under the tongue every 5 minutes if needed for chest pain.       rosuvastatin 5 mg tablet  Commonly known as: Crestor      TAKE ONE TABLET BY MOUTH DAILY       traZODone 50 mg tablet  Commonly known as: Desyrel      TAKE ONE TABLET BY MOUTH EVERY NIGHT AT BEDTIME                 Where to Get Your Medications        Information about where to get these medications is not yet available    Ask your nurse or doctor about these medications  atorvastatin 20 mg tablet  traMADol 50 mg tablet         Test Results Pending At Discharge  Pending Labs       No current pending labs.            Hospital Course   Silva Corrigan is a 68 y.o. female  Who presented to the emergency room after having a fall at home complaining from right knee pain and neck pain and back pain.  On presentation, blood pressure 110/92, heart rate 74, respiratory rate 16, afebrile, saturation oxygen 94% on room air.  Blood workup came back grossly within normal limits except for a potassium of 3.2.  Urinalysis showed some trace leukocyte esterase and bacteria.  Urine culture sent.  CT scan of the head and cervical spine did not show any acute fractures.  Knee x-ray did not show any acute fracture.  Patient was given in the emergency room IV fluids and then admitted to the medical service for further investigation management.  Patient is currently resting comfortably in her bed.  She is awake.   But a poor historian.  Denies having any pain at the time being.  No family at bedside.  Rest of the history was taken from the emergency room records.  Patient lives with her son.  Son noted that patient has been having a generalized progressive decline in her functional status over the past few weeks.  Has been requiring more care with her ADLs.  Mainly bathing showering getting dressed.  And she has been even requiring assistance with ambulating to the bathroom.  Her gait has become unsteady and she has been falling.  So she was brought to the emergency room    Patient was seen by PT and OT commended SNF placement.  Patient verbalizes recurrent falls at home and bruising noted to bilateral buttocks.  Patient urine culture showed no growth.  Patient is in fair condition to discharge to rehab.  Hypokalemia has resolved and potassium level is 3.5 this a.m. resume home meds.  Patient started on atorvastatin 20 mg daily.  Patient to follow-up with PCP in 2 weeks    Pertinent Physical Exam At Time of Discharge  Physical Exam  General Appearance: AAO x 3, not in acute distress  Skin: skin color pink, warm, and dry; bruising to bilateral buttocks from falls  Eyes : PERRL, EOM's intact  ENT: mucous membranes pink and moist  Neck: normocephalic  Respiratory: lungs clear to auscultation anteriorly; no wheezing, rhonchi, or crackles.   Heart: regular rate and rhythm.   Abdomen: Nondistended, positive bowel sounds x4, soft,  nontender  Extremities: no edema   Peripheral pulses: normal x4 extremities  Neuro: alert, coherent and conversant, no focal motor deficits  Outpatient Follow-Up  Future Appointments   Date Time Provider Department Center   3/18/2024  1:00 PM Alicia Mendoza DO [id number] Imer Hayes, APRN-CNP

## 2024-02-27 NOTE — DISCHARGE INSTR - OTHER ORDERS
Preventing falls in adults    The Basics  Written by the doctors and editors at [facility]  Am I at risk of falling? -- Falls can happen to anyone, no matter how old they are. Several things can increase your risk of a fall, including:  ? Vision problems  ? Having certain chronic health conditions, being sick, having recently been in the hospital, or having had surgery  ? Changing the medicines you take  ? An unsafe or unfamiliar setting (for example, a room with rugs or furniture that might trip you, or an area you don't know well)  Your risk of falling increases as you grow older. That's because getting older can make it harder to walk steadily and keep your balance. Also, the effects of falls are more serious for older people.  Overall, 3 to 4 out of every 10 people over the age of 65 fall each year. Many people who fracture a hip never fully recover to how they were before the fracture. If you have fallen in the past, you are at higher risk of falling again.  How can my doctor help me avoid falling? -- Your doctor can talk to you about the following things:  ? Past falls - It is important to tell your doctor about any times that you have fallen or almost fallen. They can then suggest ways to prevent another fall.  ? Your health conditions - Some health problems can put you at risk of falling. These include conditions that affect eyesight, hearing, muscle strength, or balance.  ? What to do if you are in the hospital - Falls can happen in the hospital because you are in an unfamiliar place. You might feel unsteady or drowsy from medicines or anesthesia. Or you might be attached to catheters or IV tubing that you could trip over. You are also likely to be weaker than usual.  ? Your medicines - Certain medicines can increase the risk of falling. These include some medicines for sleeping problems, anxiety, high blood pressure, or depression. Adding new medicines, or changing doses of some medicines, can also affect  your risk of falling.  The more your doctor knows about your situation, the better they can help you. For example, if you fell because you have a condition that causes pain, your doctor might suggest treatments to help with the pain. Or if any of your medicines are making you dizzy and more likely to fall, your doctor might switch you to a different medicine.  Is there anything I can do on my own? -- Yes. To help keep from falling, you can:  ? Make your home safer - To avoid falling at home, get rid of things that might make you trip or slip. This can include furniture, electrical cords, clutter, and loose rugs (figure 1). Keep your home well lit so you can easily see where you are going. Avoid storing things in high places so you don't have to reach or climb.  ? Wear non-slip socks or sturdy shoes that fit well - Wearing shoes with high heels or slippery soles, or shoes that are too loose, can lead to falls. Walking around in bare feet, or only socks, can also increase your risk of falling.  ? Take vitamin D pills - Taking vitamin D might lower the risk of falls in older people. This is because vitamin D helps make bones and muscles stronger. Your doctor can talk to you about whether you should take extra vitamin D, and how much.  ? Stay active - Moving your body regularly can help lower your risk of falling. It might also help prevent you from getting hurt if you do fall. It is best to do a few different activities that help with both strength and balance. There are many kinds of exercise that can be safe for older people. These include walking, swimming, and yoni chi (a Chinese martial art involving slow, gentle movements).  ? Use a cane, walker, or other safety devices - If your doctor recommends that you use a cane or walker, make sure that it's the right size and you know how to use it. There are other devices that might help you avoid falling, too. These include grab bars or a sturdy seat for the shower,  non-slip bath mats, and hand rails or treads for the stairs (to prevent slipping).  ? Take extra care if you have had surgery or are in the hospital - Ask for help with getting out of bed, getting up from a chair, or going to the bathroom. Your body needs time to heal, and it's normal to need help with these things while you recover. It can also help to keep your belongings within reach, and avoid walking around in the dark. If you need help, use the call button instead of trying to get up.  If you worry that you could fall, you can get an emergency alert system. This is usually an alarm button that lets you call for help if you fall and can't get up. If you live alone and you do not have an emergency alert system, always carry a cell phone or portable phone with you when moving around the house.  How do I get up from a fall? -- If you do fall, try not to be afraid. Stay calm, and take slow, deep breaths. If someone is near you, call for help right away. If you have an emergency alert system, use it.  Try to find out if you are hurt. If you are hurt badly, trying to get up can make things worse. If you do not think that you are hurt badly, come up with a plan to get up off of the floor.  Some tips to get up after a fall if you are alone:  ? Look around you for a piece of sturdy furniture such as a couch or chair. Try to move your body to the furniture. You might need to scoot, crawl, or roll to get close. Do these movements very slowly. If you feel any sharp pains, you might need to stop.  ? Once you are close to the furniture, roll onto your side. Pull your knees up toward your chest, and try to get on your hands and knees.  ? Put your hands on the seat of the couch or chair.  ? Bring 1 leg forward so the foot is flat on the floor. If you have a stronger leg, move this leg first.  ? Now, try to stand up. Once both feet are on the ground, slowly turn and lower yourself to sit down on the seat.  What should I do  after a fall? -- If you had a fall, see your doctor right away, even if you aren't hurt. Your doctor can try to figure out what caused you to fall, and how likely you are to fall again. They will do an exam and talk to you about your health problems, medicines, and activities. They can also check how well you walk, move, and balance. Then, they can suggest things you can do to lower your risk of falling again.  Many older people have a hard time recovering after a fall. Doing things to prevent falling can help you protect your health and independence.  All topics are updated as new evidence becomes available and our peer review process is complete.  This topic retrieved from [employer] on: Feb 26, 2024.

## 2024-02-27 NOTE — PROGRESS NOTES
Physical Therapy    Physical Therapy Treatment    Patient Name: Silva Crorigan  MRN: [id number]  Today's Date: 2/27/2024  Time Calculation  Start Time: 1118  Stop Time: 1148  Time Calculation (min): 30 min       Assessment/Plan   PT Assessment  PT Assessment Results: Decreased strength, Decreased endurance, Impaired balance, Decreased mobility, Impaired judgement, Decreased safety awareness, Pain  Rehab Prognosis: Good  Evaluation/Treatment Tolerance: Patient limited by pain, Patient limited by fatigue  End of Session Communication: Bedside nurse  Assessment Comment: OT present in room upon arrival.  Pt. agreeable to seeing both OT/PT this session.  CGA/min assist x1 with STS transfers.  Pt. required verbal cues with hand placement during transfers with walker.  Pt. also given cues with keeping the walker close to her during gait especially when she is turning directions.  Pt. ambulated 74ft and then required a seated rest break d/t fatigue.  She denies SOB.  Pt. walked another 20ft where she was in the bathroom with OT brushing her teeth.  Pt. able to maintain standing balance but does c/o legs getting shaky towards end of session.  Pt. returned to chair which she completed LE exercises with ease.  Continue with POC and progress per tolerance to improve endurance and ease with mobility.  MB  End of Session Patient Position: Up in chair, Alarm on  PT Plan  Inpatient/Swing Bed or Outpatient: Inpatient  PT Plan  Treatment/Interventions: Bed mobility, Transfer training, Gait training, Stair training, Balance training, Neuromuscular re-education, Strengthening, Endurance training, Range of motion, Therapeutic exercise, Therapeutic activity, Home exercise program  PT Plan: Skilled PT  PT Frequency: 5 times per week  PT Discharge Recommendations: Moderate intensity level of continued care, 24 hr supervision due to cognition  PT Recommended Transfer Status: Assist x2, Assistive device  PT - OK to Discharge: Yes (PT eval  complete, ok to d/c to next level of care with 24 hour assist when deemed medically appropriate.)    Current Problem:  Patient Active Problem List   Diagnosis    CAD (coronary artery disease)    Depression, major, single episode, mild (CMS/[facility])    Diabetes mellitus, type 2 (CMS/[facility])    Esophageal polyp    GERD (gastroesophageal reflux disease)    Hyperlipidemia LDL goal <70    IBS (irritable bowel syndrome)    Insomnia    Peripheral neuropathy    Restless leg    Seasonal allergies    Polyarthropathy    Vascular dementia, uncomplicated (CMS/[facility])    Vitamin B12 deficiency    Obesity, morbid (CMS/[facility])    History of breast cancer    Overactive bladder    Primary hypertension    Fatigue    Balance disorder    Urinary incontinence    Dysuria    Acute cystitis without hematuria    Frequent falls    Falling       General Visit Information:   PT  Visit  PT Received On: 02/27/24  General  Co-Treatment: OT  Co-Treatment Reason: to maximize patient's safety during session  Patient Position Received: Alarm on, Up in chair  Subjective     Precautions:  Precautions  Medical Precautions: Fall precautions  Precautions Comment: purewick external catheter (discontinued for mobility)    Vital Signs:  Vital Signs  SpO2: 96 %  Objective     Pain:  Pain Assessment  Pain Assessment: 0-10  Pain Score: 0 - No pain    Cognition:       Postural Control:       Extremity/Trunk Assessments:                Activity Tolerance:       Treatments:  Therapeutic Exercise  Therapeutic Exercise Performed: Yes  Therapeutic Exercise Activity 1: Seated LAQ's b/l x10  Therapeutic Exercise Activity 2: Seated Marches, alt b/l x10  Therapeutic Exercise Activity 3: Seated Heel raises b/l x10  Therapeutic Exercise Activity 4: Seated Toe raises b/l x10  Therapeutic Exercise Activity 5: Seated hip adduction x10           Ambulation/Gait Training  Ambulation/Gait Training Performed: Yes  Ambulation/Gait Training 1  Surface 1: Level tile  Device 1: Rolling  walker  Gait Support Devices: Gait belt  Assistance 1: Minimum assistance  Quality of Gait 1: Narrow base of support  Comments/Distance (ft) 1: 74ft.  Ambulation/Gait Training 2  Surface 2: Level tile  Device 2: Rolling walker  Gait Support Devices: Gait belt  Assistance 2: Minimum assistance  Quality of Gait 2: Narrow base of support  Comments/Distance (ft) 2: 20ft  Transfers  Transfer: Yes  Transfer 1  Transfer From 1: Sit to  Transfer to 1: Stand  Transfer Device 1: Gait belt, Walker  Transfer Level of Assistance 1: Minimum assistance  Transfers 2  Transfer From 2: Stand to  Transfer to 2: Sit  Transfer Device 2: Gait belt  Transfer Level of Assistance 2: Contact guard          Outcome Measures:  [facility] Basic Mobility  Turning from your back to your side while in a flat bed without using bedrails: A little  Moving from lying on your back to sitting on the side of a flat bed without using bedrails: A little  Moving to and from bed to chair (including a wheelchair): None  Standing up from a chair using your arms (e.g. wheelchair or bedside chair): None  To walk in hospital room: A little  Climbing 3-5 steps with railing: A little  Basic Mobility - Total Score: 20  Education Documentation  Mobility Training, taught by Maia Navarrete PTA at 2/27/2024 12:06 PM.  Learner: Patient  Readiness: Acceptance  Method: Explanation  Response: Needs Reinforcement    Education Comments  No comments found.        EDUCATION:     Encounter Problems       Encounter Problems (Active)       PT Problem       Pt will demonstrate sup > sit and sit > sup bed mobility mod I (Progressing)       Start:  02/26/24    Expected End:  03/11/24            Pt will demo sit > stand and stand > sit transfer with ww and SBA  (Progressing)       Start:  02/26/24    Expected End:  03/11/24            Pt will ambulate 50' with ww and SBA, without LOB  (Progressing)       Start:  02/26/24    Expected End:  03/11/24            Pt will demonstrate ability  to tolerate 8 minutes of seated or standing therapeutic exercise with 4 or less rest breaks to demonstrate improved activity tolerance.  (Progressing)       Start:  02/26/24    Expected End:  03/11/24               Pain - Adult

## 2024-02-28 NOTE — PROGRESS NOTES
1545:  assisted ISIDRO/Jenny with this case this afternoon as patient's son was expressing concern and frustration to the team that patient's son was not informed that patient was appropriate for discharge today, and was not informed of patient's transport time until patient was already in transit van. Per previous notes, patient has dementia at baseline and does not have capacity to make patient's own decisions. Patient's son is patient's POA. SW met with patient, house supervisor/Dari, and patient's  from [facility] at transit van. Patient was able to use patient's phone to find son's number, and team phoned patient's son with patient on speaker phone. Patient's son reiterated above concerns. SW apologized for team's lack of appropriate communication with patient's son, and asked if son were comfortable with plan for patient's discharge and transportation to [facility] today. Patient's son expressed agreement with same. SW provided phone number for Patient Advocacy Line for patient's son to be able to voice his above concerns. Patient's son expressed appreciation. SW updated Care Transitions Supervisor/Sravanthi Chang re: situation. Patient to discharge to [facility] today. No further Care Transitions needs foreseen. Care Transitions available upon request. TORREY Rick

## 2024-02-28 NOTE — PROGRESS NOTES
Pt son Amol Rivera called extremely upset (his mother called him to tell him she was being taken somewhere) that he had not been notified that his mother was being discharged today and he had been here today.  I had not been in the room when the son was here. According to the previous days entry the son was aware she was going to [facility] on discharge. [facility] arrived 2 hours earlier than expected and I had not yet called to notify him. I explained to him that I thought he was aware she was going there. He said I thought it was going to be a few days. He then hung up and the next phone call I received was from the discharge nurse stating the son was on the phone with [facility] telling them not to leave. TCC then consulted SW as TCC and Discharge nurse were at a loss as to what to do next. SW went to where [facility] was with pt and son on the phone to assess the situation. Jenny Martinez BSN/RN-TCC

## 2024-02-29 ENCOUNTER — NURSING HOME VISIT (OUTPATIENT)
Dept: POST ACUTE CARE | Facility: EXTERNAL LOCATION | Age: 69
End: 2024-02-29
Payer: MEDICARE

## 2024-02-29 DIAGNOSIS — E11.9 TYPE 2 DIABETES MELLITUS WITHOUT COMPLICATION, WITHOUT LONG-TERM CURRENT USE OF INSULIN (MULTI): Primary | ICD-10-CM

## 2024-02-29 DIAGNOSIS — F01.A0 MILD VASCULAR DEMENTIA WITHOUT BEHAVIORAL DISTURBANCE, PSYCHOTIC DISTURBANCE, MOOD DISTURBANCE, OR ANXIETY (MULTI): ICD-10-CM

## 2024-02-29 PROCEDURE — 99309 SBSQ NF CARE MODERATE MDM 30: CPT | Performed by: NURSE PRACTITIONER

## 2024-02-29 NOTE — LETTER
Patient: Silva Corrigan  : 1955    Encounter Date: 2024    Subjective  Patient ID: Silva Corrigan is a 68 y.o. female who presents for No chief complaint on file..  69 yo new resident at [facility] with history of HTN, DM, dementia, depression, insomnia.  Resident is without any c/o or concerns at this time.          Review of Systems   Constitutional:  Negative for chills, fatigue and fever.   Respiratory: Negative.     Cardiovascular: Negative.    Gastrointestinal: Negative.    Genitourinary: Negative.        Objective  Physical Exam  Constitutional:       General: She is not in acute distress.     Appearance: She is not ill-appearing.   HENT:      Head: Normocephalic.      Nose: No rhinorrhea.   Eyes:      General:         Right eye: No discharge.         Left eye: No discharge.   Neck:      Vascular: No carotid bruit.   Cardiovascular:      Rate and Rhythm: Normal rate and regular rhythm.   Pulmonary:      Effort: Pulmonary effort is normal.      Breath sounds: Normal breath sounds. No wheezing, rhonchi or rales.   Abdominal:      General: Bowel sounds are normal.      Tenderness: There is no abdominal tenderness.   Lymphadenopathy:      Cervical: No cervical adenopathy.   Skin:     General: Skin is warm and dry.   Neurological:      Mental Status: She is alert.   Psychiatric:         Mood and Affect: Mood normal.           Current Outpatient Medications:   •  acetaminophen (Tylenol) 500 mg tablet, Take 1 tablet (500 mg) by mouth every 6 hours if needed., Disp: , Rfl:   •  aspirin 81 mg chewable tablet, Chew 1 tablet (81 mg)., Disp: , Rfl:   •  atorvastatin (Lipitor) 20 mg tablet, Take 1 tablet (20 mg) by mouth once daily at bedtime., Disp: , Rfl:   •  blood sugar diagnostic (Blood Glucose Test) strip, 100 strips 2 times a day., Disp: 100 strip, Rfl: 11  •  BLOOD SUGAR DIAGNOSTIC MISC, by in vitro route., Disp: , Rfl:   •  blood sugar diagnostic strip, , Disp: , Rfl:   •  buPROPion XL (Wellbutrin XL) 300 mg 24  hr tablet, TAKE 1 TABLET BY MOUTH DAILY, Disp: 90 tablet, Rfl: 1  •  calcium carbonate-vitamin D3 600 mg-10 mcg (400 unit) chewable tablet, Chew 1 tablet once daily., Disp: , Rfl:   •  cyanocobalamin (Vitamin B-12) 1,000 mcg tablet, TAKE ONE TABLET BY MOUTH DAILY, Disp: 90 tablet, Rfl: 1  •  desvenlafaxine 100 mg 24 hr tablet, Take 1 tablet (100 mg) by mouth once daily., Disp: 90 tablet, Rfl: 1  •  esomeprazole (NexIUM) 40 mg DR capsule, Take 1 capsule (40 mg) by mouth once daily in the morning. Take before meals., Disp: 90 capsule, Rfl: 1  •  fluticasone (Flonase) 50 mcg/actuation nasal spray, Administer 2 sprays into each nostril once daily., Disp: , Rfl:   •  FreeStyle glucose monitoring kit, Test BID, Disp: 1 each, Rfl: 0  •  gabapentin (Neurontin) 300 mg capsule, Take 1 capsule (300 mg) by mouth 3 times a day., Disp: 90 capsule, Rfl: 5  •  glipiZIDE XL (Glucotrol XL) 10 mg 24 hr tablet, Take 1 tablet (10 mg) by mouth once daily. Do not crush, chew, or split., Disp: 30 tablet, Rfl: 11  •  hydrOXYzine pamoate (Vistaril) 25 mg capsule, Take 1 capsule (25 mg) by mouth 3 times a day as needed for anxiety., Disp: 90 capsule, Rfl: 0  •  hydrOXYzine pamoate (Vistaril) 25 mg capsule, Take 1 capsule (25 mg) by mouth 3 times a day as needed for anxiety., Disp: 30 capsule, Rfl: 3  •  lancets misc, Test twice daily, Disp: 100 each, Rfl: 11  •  loperamide (Imodium) 2 mg capsule, Take by mouth., Disp: , Rfl:   •  losartan (Cozaar) 50 mg tablet, Take 1 tablet (50 mg) by mouth once daily., Disp: 30 tablet, Rfl: 11  •  metoprolol tartrate (Lopressor) 100 mg tablet, Take 1 tablet (100 mg) by mouth 2 times a day., Disp: 60 tablet, Rfl: 5  •  nitroglycerin (Nitrostat) 0.4 mg SL tablet, Place 1 tablet (0.4 mg) under the tongue every 5 minutes if needed for chest pain., Disp: 30 tablet, Rfl: 1  •  rosuvastatin (Crestor) 5 mg tablet, TAKE ONE TABLET BY MOUTH DAILY, Disp: 90 tablet, Rfl: 1  •  traMADol (Ultram) 50 mg tablet, Take 1  tablet (50 mg) by mouth every 6 hours if needed for moderate pain (4 - 6)., Disp: 10 tablet, Rfl: 0  •  traZODone (Desyrel) 50 mg tablet, TAKE ONE TABLET BY MOUTH EVERY NIGHT AT BEDTIME, Disp: 90 tablet, Rfl: 1     Assessment/Plan  Problem List Items Addressed This Visit          Endocrine/Metabolic    Diabetes mellitus, type 2 (CMS/HCC) - Primary       Neuro    Vascular dementia (CMS/HCC)   Hgba1c is 7.4.  Chart reviewed.  Will continue same meds at this time.  Vitals stable.  Will continue same regimen.  Continue supportive care.             Electronically Signed By: ROWENA Barahona   3/13/24  9:38 AM

## 2024-03-12 LAB
ATRIAL RATE: 75 BPM
P AXIS: 13 DEGREES
P OFFSET: 202 MS
P ONSET: 160 MS
PR INTERVAL: 118 MS
Q ONSET: 219 MS
QRS COUNT: 13 BEATS
QRS DURATION: 102 MS
QT INTERVAL: 424 MS
QTC CALCULATION(BAZETT): 473 MS
QTC FREDERICIA: 456 MS
R AXIS: 12 DEGREES
T AXIS: 41 DEGREES
T OFFSET: 431 MS
VENTRICULAR RATE: 75 BPM

## 2024-03-13 ASSESSMENT — ENCOUNTER SYMPTOMS
CARDIOVASCULAR NEGATIVE: 1
CHILLS: 0
GASTROINTESTINAL NEGATIVE: 1
FEVER: 0
FATIGUE: 0
RESPIRATORY NEGATIVE: 1

## 2024-03-13 NOTE — PROGRESS NOTES
Subjective   Patient ID: Silva Corrigan is a 68 y.o. female who presents for No chief complaint on file..  69 yo new resident at [facility] with history of HTN, DM, dementia, depression, insomnia.  Resident is without any c/o or concerns at this time.          Review of Systems   Constitutional:  Negative for chills, fatigue and fever.   Respiratory: Negative.     Cardiovascular: Negative.    Gastrointestinal: Negative.    Genitourinary: Negative.        Objective   Physical Exam  Constitutional:       General: She is not in acute distress.     Appearance: She is not ill-appearing.   HENT:      Head: Normocephalic.      Nose: No rhinorrhea.   Eyes:      General:         Right eye: No discharge.         Left eye: No discharge.   Neck:      Vascular: No carotid bruit.   Cardiovascular:      Rate and Rhythm: Normal rate and regular rhythm.   Pulmonary:      Effort: Pulmonary effort is normal.      Breath sounds: Normal breath sounds. No wheezing, rhonchi or rales.   Abdominal:      General: Bowel sounds are normal.      Tenderness: There is no abdominal tenderness.   Lymphadenopathy:      Cervical: No cervical adenopathy.   Skin:     General: Skin is warm and dry.   Neurological:      Mental Status: She is alert.   Psychiatric:         Mood and Affect: Mood normal.           Current Outpatient Medications:     acetaminophen (Tylenol) 500 mg tablet, Take 1 tablet (500 mg) by mouth every 6 hours if needed., Disp: , Rfl:     aspirin 81 mg chewable tablet, Chew 1 tablet (81 mg)., Disp: , Rfl:     atorvastatin (Lipitor) 20 mg tablet, Take 1 tablet (20 mg) by mouth once daily at bedtime., Disp: , Rfl:     blood sugar diagnostic (Blood Glucose Test) strip, 100 strips 2 times a day., Disp: 100 strip, Rfl: 11    BLOOD SUGAR DIAGNOSTIC MISC, by in vitro route., Disp: , Rfl:     blood sugar diagnostic strip, , Disp: , Rfl:     buPROPion XL (Wellbutrin XL) 300 mg 24 hr tablet, TAKE 1 TABLET BY MOUTH DAILY, Disp: 90 tablet, Rfl: 1     calcium carbonate-vitamin D3 600 mg-10 mcg (400 unit) chewable tablet, Chew 1 tablet once daily., Disp: , Rfl:     cyanocobalamin (Vitamin B-12) 1,000 mcg tablet, TAKE ONE TABLET BY MOUTH DAILY, Disp: 90 tablet, Rfl: 1    desvenlafaxine 100 mg 24 hr tablet, Take 1 tablet (100 mg) by mouth once daily., Disp: 90 tablet, Rfl: 1    esomeprazole (NexIUM) 40 mg DR capsule, Take 1 capsule (40 mg) by mouth once daily in the morning. Take before meals., Disp: 90 capsule, Rfl: 1    fluticasone (Flonase) 50 mcg/actuation nasal spray, Administer 2 sprays into each nostril once daily., Disp: , Rfl:     FreeStyle glucose monitoring kit, Test BID, Disp: 1 each, Rfl: 0    gabapentin (Neurontin) 300 mg capsule, Take 1 capsule (300 mg) by mouth 3 times a day., Disp: 90 capsule, Rfl: 5    glipiZIDE XL (Glucotrol XL) 10 mg 24 hr tablet, Take 1 tablet (10 mg) by mouth once daily. Do not crush, chew, or split., Disp: 30 tablet, Rfl: 11    hydrOXYzine pamoate (Vistaril) 25 mg capsule, Take 1 capsule (25 mg) by mouth 3 times a day as needed for anxiety., Disp: 90 capsule, Rfl: 0    hydrOXYzine pamoate (Vistaril) 25 mg capsule, Take 1 capsule (25 mg) by mouth 3 times a day as needed for anxiety., Disp: 30 capsule, Rfl: 3    lancets misc, Test twice daily, Disp: 100 each, Rfl: 11    loperamide (Imodium) 2 mg capsule, Take by mouth., Disp: , Rfl:     losartan (Cozaar) 50 mg tablet, Take 1 tablet (50 mg) by mouth once daily., Disp: 30 tablet, Rfl: 11    metoprolol tartrate (Lopressor) 100 mg tablet, Take 1 tablet (100 mg) by mouth 2 times a day., Disp: 60 tablet, Rfl: 5    nitroglycerin (Nitrostat) 0.4 mg SL tablet, Place 1 tablet (0.4 mg) under the tongue every 5 minutes if needed for chest pain., Disp: 30 tablet, Rfl: 1    rosuvastatin (Crestor) 5 mg tablet, TAKE ONE TABLET BY MOUTH DAILY, Disp: 90 tablet, Rfl: 1    traMADol (Ultram) 50 mg tablet, Take 1 tablet (50 mg) by mouth every 6 hours if needed for moderate pain (4 - 6)., Disp: 10  tablet, Rfl: 0    traZODone (Desyrel) 50 mg tablet, TAKE ONE TABLET BY MOUTH EVERY NIGHT AT BEDTIME, Disp: 90 tablet, Rfl: 1     Assessment/Plan   Problem List Items Addressed This Visit          Endocrine/Metabolic    Diabetes mellitus, type 2 (CMS/HCC) - Primary       Neuro    Vascular dementia (CMS/HCC)   Hgba1c is 7.4.  Chart reviewed.  Will continue same meds at this time.  Vitals stable.  Will continue same regimen.  Continue supportive care.

## 2024-03-18 ENCOUNTER — APPOINTMENT (OUTPATIENT)
Dept: PRIMARY CARE | Facility: CLINIC | Age: 69
End: 2024-03-18
Payer: MEDICARE

## 2024-03-21 ENCOUNTER — NURSING HOME VISIT (OUTPATIENT)
Dept: POST ACUTE CARE | Facility: EXTERNAL LOCATION | Age: 69
End: 2024-03-21

## 2024-03-21 ENCOUNTER — APPOINTMENT (OUTPATIENT)
Dept: PRIMARY CARE | Facility: CLINIC | Age: 69
End: 2024-03-21
Payer: MEDICARE

## 2024-03-21 DIAGNOSIS — R29.6 FREQUENT FALLS: Primary | ICD-10-CM

## 2024-03-21 DIAGNOSIS — F01.A0 MILD VASCULAR DEMENTIA WITHOUT BEHAVIORAL DISTURBANCE, PSYCHOTIC DISTURBANCE, MOOD DISTURBANCE, OR ANXIETY (MULTI): ICD-10-CM

## 2024-03-21 PROCEDURE — 99304 1ST NF CARE SF/LOW MDM 25: CPT | Performed by: INTERNAL MEDICINE

## 2024-03-21 NOTE — LETTER
Patient: Silva Corrigan  : 1955    Encounter Date: 2024    Pt was seen in the NH.  69 YO F WITH PMH CAD DM2 HLD VASCULAR DEMENTIA HERE FOR REHAB AFTER FALL AND HEAD INJURY FEELS FINE NOW  Pt is in usual state , no complaint  General appearance: Comfortable, no distress  ROS: No SOB  Medications reviewed  Head: Normal  Neck: Soft  Heart: Regular  Lungs: Clear  Abdomen: soft    Impression REHAB, SUPPORTIVE CARE,, continue current management    Problem List Items Addressed This Visit       Vascular dementia (CMS/HCC)    Frequent falls - Primary          Electronically Signed By: Chevy Ramachandran MD   3/21/24 10:00 PM

## 2024-03-22 NOTE — PROGRESS NOTES
Pt was seen in the NH.  69 YO F WITH PMH CAD DM2 HLD VASCULAR DEMENTIA HERE FOR REHAB AFTER FALL AND HEAD INJURY FEELS FINE NOW  Pt is in usual state , no complaint  General appearance: Comfortable, no distress  ROS: No SOB  Medications reviewed  Head: Normal  Neck: Soft  Heart: Regular  Lungs: Clear  Abdomen: soft    Impression REHAB, SUPPORTIVE CARE,, continue current management    Problem List Items Addressed This Visit       Vascular dementia (CMS/HCC)    Frequent falls - Primary

## 2024-04-11 ENCOUNTER — NURSING HOME VISIT (OUTPATIENT)
Dept: POST ACUTE CARE | Facility: EXTERNAL LOCATION | Age: 69
End: 2024-04-11
Payer: MEDICARE

## 2024-04-11 DIAGNOSIS — F01.A0 MILD VASCULAR DEMENTIA WITHOUT BEHAVIORAL DISTURBANCE, PSYCHOTIC DISTURBANCE, MOOD DISTURBANCE, OR ANXIETY (MULTI): Primary | ICD-10-CM

## 2024-04-11 PROCEDURE — 99309 SBSQ NF CARE MODERATE MDM 30: CPT | Performed by: NURSE PRACTITIONER

## 2024-04-11 ASSESSMENT — ENCOUNTER SYMPTOMS
CARDIOVASCULAR NEGATIVE: 1
FEVER: 0
RESPIRATORY NEGATIVE: 1
NUMBNESS: 0
CHILLS: 0
HEADACHES: 0
NERVOUS/ANXIOUS: 1
DIZZINESS: 0
FATIGUE: 0
GASTROINTESTINAL NEGATIVE: 1
CONFUSION: 1

## 2024-04-11 NOTE — PROGRESS NOTES
Subjective   Patient ID: Silva Corrigan is a 68 y.o. female who presents for No chief complaint on file..  67 yo female at [facility] with history of dm, dementia, anxiety, htn, old MI, GERD, IBS, frequent falls.  Staff requesting evaluation as resident has seemed more confused lately and just not herself.  She has been staying in her room more and not associating with others.  Staff reports resident is making more confused statements.          Review of Systems   Constitutional:  Negative for chills, fatigue and fever.   Respiratory: Negative.     Cardiovascular: Negative.    Gastrointestinal: Negative.    Musculoskeletal:         Resident reports she was having some back pain a couple days ago and into her shoulders however, states she has not had any pain since yesterday.     Neurological:  Negative for dizziness, numbness and headaches.   Psychiatric/Behavioral:  Positive for confusion. The patient is nervous/anxious.        Objective   Physical Exam  Constitutional:       Comments: Resting in bed in room.  Awakens easily.    Cardiovascular:      Rate and Rhythm: Normal rate and regular rhythm.   Pulmonary:      Effort: Pulmonary effort is normal.      Breath sounds: Normal breath sounds. No wheezing, rhonchi or rales.   Abdominal:      General: Bowel sounds are normal.      Comments: States she has a little discomfort when palpating over bladder.     Musculoskeletal:      Right lower leg: No edema.      Left lower leg: No edema.   Skin:     General: Skin is warm and dry.   Neurological:      Mental Status: She is alert. She is disoriented.      Motor: No weakness.   Psychiatric:         Attention and Perception: Attention normal.         Mood and Affect: Mood is not anxious or depressed.         Speech: Speech is not rapid and pressured or slurred.         Behavior: Behavior is cooperative.         Cognition and Memory: Cognition is impaired. Memory is impaired.      Comments: Resident knows her name.  She is unable to  "state date, month or time.  Resident believes she is at work.  She cannot remember where she works or any previous names of her jobs.  States \"they keep me in this f...... bed all the time\".             Current Outpatient Medications:     acetaminophen (Tylenol) 500 mg tablet, Take 1 tablet (500 mg) by mouth every 6 hours if needed., Disp: , Rfl:     aspirin 81 mg chewable tablet, Chew 1 tablet (81 mg)., Disp: , Rfl:     atorvastatin (Lipitor) 20 mg tablet, Take 1 tablet (20 mg) by mouth once daily at bedtime., Disp: , Rfl:     blood sugar diagnostic (Blood Glucose Test) strip, 100 strips 2 times a day., Disp: 100 strip, Rfl: 11    BLOOD SUGAR DIAGNOSTIC MISC, by in vitro route., Disp: , Rfl:     blood sugar diagnostic strip, , Disp: , Rfl:     buPROPion XL (Wellbutrin XL) 300 mg 24 hr tablet, TAKE 1 TABLET BY MOUTH DAILY, Disp: 90 tablet, Rfl: 1    calcium carbonate-vitamin D3 600 mg-10 mcg (400 unit) chewable tablet, Chew 1 tablet once daily., Disp: , Rfl:     cyanocobalamin (Vitamin B-12) 1,000 mcg tablet, TAKE ONE TABLET BY MOUTH DAILY, Disp: 90 tablet, Rfl: 1    desvenlafaxine 100 mg 24 hr tablet, Take 1 tablet (100 mg) by mouth once daily., Disp: 90 tablet, Rfl: 1    esomeprazole (NexIUM) 40 mg DR capsule, Take 1 capsule (40 mg) by mouth once daily in the morning. Take before meals., Disp: 90 capsule, Rfl: 1    fluticasone (Flonase) 50 mcg/actuation nasal spray, Administer 2 sprays into each nostril once daily., Disp: , Rfl:     FreeStyle glucose monitoring kit, Test BID, Disp: 1 each, Rfl: 0    gabapentin (Neurontin) 300 mg capsule, Take 1 capsule (300 mg) by mouth 3 times a day., Disp: 90 capsule, Rfl: 5    glipiZIDE XL (Glucotrol XL) 10 mg 24 hr tablet, Take 1 tablet (10 mg) by mouth once daily. Do not crush, chew, or split., Disp: 30 tablet, Rfl: 11    hydrOXYzine pamoate (Vistaril) 25 mg capsule, Take 1 capsule (25 mg) by mouth 3 times a day as needed for anxiety., Disp: 90 capsule, Rfl: 0    hydrOXYzine " pamoate (Vistaril) 25 mg capsule, Take 1 capsule (25 mg) by mouth 3 times a day as needed for anxiety., Disp: 30 capsule, Rfl: 3    lancets misc, Test twice daily, Disp: 100 each, Rfl: 11    loperamide (Imodium) 2 mg capsule, Take by mouth., Disp: , Rfl:     losartan (Cozaar) 50 mg tablet, Take 1 tablet (50 mg) by mouth once daily., Disp: 30 tablet, Rfl: 11    metoprolol tartrate (Lopressor) 100 mg tablet, Take 1 tablet (100 mg) by mouth 2 times a day., Disp: 60 tablet, Rfl: 5    nitroglycerin (Nitrostat) 0.4 mg SL tablet, Place 1 tablet (0.4 mg) under the tongue every 5 minutes if needed for chest pain., Disp: 30 tablet, Rfl: 1    rosuvastatin (Crestor) 5 mg tablet, TAKE ONE TABLET BY MOUTH DAILY, Disp: 90 tablet, Rfl: 1    traMADol (Ultram) 50 mg tablet, Take 1 tablet (50 mg) by mouth every 6 hours if needed for moderate pain (4 - 6)., Disp: 10 tablet, Rfl: 0    traZODone (Desyrel) 50 mg tablet, TAKE ONE TABLET BY MOUTH EVERY NIGHT AT BEDTIME, Disp: 90 tablet, Rfl: 1     Assessment/Plan   Problem List Items Addressed This Visit          Neuro    Vascular dementia (CMS/HCC) - Primary   Will get labs and UA C&S.   She has history of cystitis.  May need CT/MRI of head if confusion worsens or any new symptoms develop.   Continue to monitor.

## 2024-04-11 NOTE — LETTER
Patient: Silva Corrigan  : 1955    Encounter Date: 2024    Subjective  Patient ID: Silva Corrigan is a 68 y.o. female who presents for No chief complaint on file..  69 yo female at [facility] with history of dm, dementia, anxiety, htn, old MI, GERD, IBS, frequent falls.  Staff requesting evaluation as resident has seemed more confused lately and just not herself.  She has been staying in her room more and not associating with others.  Staff reports resident is making more confused statements.          Review of Systems   Constitutional:  Negative for chills, fatigue and fever.   Respiratory: Negative.     Cardiovascular: Negative.    Gastrointestinal: Negative.    Musculoskeletal:         Resident reports she was having some back pain a couple days ago and into her shoulders however, states she has not had any pain since yesterday.     Neurological:  Negative for dizziness, numbness and headaches.   Psychiatric/Behavioral:  Positive for confusion. The patient is nervous/anxious.        Objective  Physical Exam  Constitutional:       Comments: Resting in bed in room.  Awakens easily.    Cardiovascular:      Rate and Rhythm: Normal rate and regular rhythm.   Pulmonary:      Effort: Pulmonary effort is normal.      Breath sounds: Normal breath sounds. No wheezing, rhonchi or rales.   Abdominal:      General: Bowel sounds are normal.      Comments: States she has a little discomfort when palpating over bladder.     Musculoskeletal:      Right lower leg: No edema.      Left lower leg: No edema.   Skin:     General: Skin is warm and dry.   Neurological:      Mental Status: She is alert. She is disoriented.      Motor: No weakness.   Psychiatric:         Attention and Perception: Attention normal.         Mood and Affect: Mood is not anxious or depressed.         Speech: Speech is not rapid and pressured or slurred.         Behavior: Behavior is cooperative.         Cognition and Memory: Cognition is impaired. Memory is  "impaired.      Comments: Resident knows her name.  She is unable to state date, month or time.  Resident believes she is at work.  She cannot remember where she works or any previous names of her jobs.  States \"they keep me in this f...... bed all the time\".             Current Outpatient Medications:   •  acetaminophen (Tylenol) 500 mg tablet, Take 1 tablet (500 mg) by mouth every 6 hours if needed., Disp: , Rfl:   •  aspirin 81 mg chewable tablet, Chew 1 tablet (81 mg)., Disp: , Rfl:   •  atorvastatin (Lipitor) 20 mg tablet, Take 1 tablet (20 mg) by mouth once daily at bedtime., Disp: , Rfl:   •  blood sugar diagnostic (Blood Glucose Test) strip, 100 strips 2 times a day., Disp: 100 strip, Rfl: 11  •  BLOOD SUGAR DIAGNOSTIC MISC, by in vitro route., Disp: , Rfl:   •  blood sugar diagnostic strip, , Disp: , Rfl:   •  buPROPion XL (Wellbutrin XL) 300 mg 24 hr tablet, TAKE 1 TABLET BY MOUTH DAILY, Disp: 90 tablet, Rfl: 1  •  calcium carbonate-vitamin D3 600 mg-10 mcg (400 unit) chewable tablet, Chew 1 tablet once daily., Disp: , Rfl:   •  cyanocobalamin (Vitamin B-12) 1,000 mcg tablet, TAKE ONE TABLET BY MOUTH DAILY, Disp: 90 tablet, Rfl: 1  •  desvenlafaxine 100 mg 24 hr tablet, Take 1 tablet (100 mg) by mouth once daily., Disp: 90 tablet, Rfl: 1  •  esomeprazole (NexIUM) 40 mg DR capsule, Take 1 capsule (40 mg) by mouth once daily in the morning. Take before meals., Disp: 90 capsule, Rfl: 1  •  fluticasone (Flonase) 50 mcg/actuation nasal spray, Administer 2 sprays into each nostril once daily., Disp: , Rfl:   •  FreeStyle glucose monitoring kit, Test BID, Disp: 1 each, Rfl: 0  •  gabapentin (Neurontin) 300 mg capsule, Take 1 capsule (300 mg) by mouth 3 times a day., Disp: 90 capsule, Rfl: 5  •  glipiZIDE XL (Glucotrol XL) 10 mg 24 hr tablet, Take 1 tablet (10 mg) by mouth once daily. Do not crush, chew, or split., Disp: 30 tablet, Rfl: 11  •  hydrOXYzine pamoate (Vistaril) 25 mg capsule, Take 1 capsule (25 mg) by " mouth 3 times a day as needed for anxiety., Disp: 90 capsule, Rfl: 0  •  hydrOXYzine pamoate (Vistaril) 25 mg capsule, Take 1 capsule (25 mg) by mouth 3 times a day as needed for anxiety., Disp: 30 capsule, Rfl: 3  •  lancets misc, Test twice daily, Disp: 100 each, Rfl: 11  •  loperamide (Imodium) 2 mg capsule, Take by mouth., Disp: , Rfl:   •  losartan (Cozaar) 50 mg tablet, Take 1 tablet (50 mg) by mouth once daily., Disp: 30 tablet, Rfl: 11  •  metoprolol tartrate (Lopressor) 100 mg tablet, Take 1 tablet (100 mg) by mouth 2 times a day., Disp: 60 tablet, Rfl: 5  •  nitroglycerin (Nitrostat) 0.4 mg SL tablet, Place 1 tablet (0.4 mg) under the tongue every 5 minutes if needed for chest pain., Disp: 30 tablet, Rfl: 1  •  rosuvastatin (Crestor) 5 mg tablet, TAKE ONE TABLET BY MOUTH DAILY, Disp: 90 tablet, Rfl: 1  •  traMADol (Ultram) 50 mg tablet, Take 1 tablet (50 mg) by mouth every 6 hours if needed for moderate pain (4 - 6)., Disp: 10 tablet, Rfl: 0  •  traZODone (Desyrel) 50 mg tablet, TAKE ONE TABLET BY MOUTH EVERY NIGHT AT BEDTIME, Disp: 90 tablet, Rfl: 1     Assessment/Plan  Problem List Items Addressed This Visit          Neuro    Vascular dementia (CMS/HCC) - Primary   Will get labs and UA C&S.   She has history of cystitis.  May need CT/MRI of head if confusion worsens or any new symptoms develop.   Continue to monitor.           Electronically Signed By: ROWENA Barahona   4/11/24  9:48 AM

## 2024-04-23 ENCOUNTER — NURSING HOME VISIT (OUTPATIENT)
Dept: POST ACUTE CARE | Facility: EXTERNAL LOCATION | Age: 69
End: 2024-04-23
Payer: MEDICARE

## 2024-04-23 DIAGNOSIS — E11.9 TYPE 2 DIABETES MELLITUS WITHOUT COMPLICATION, WITHOUT LONG-TERM CURRENT USE OF INSULIN (MULTI): Primary | ICD-10-CM

## 2024-04-23 PROCEDURE — 99308 SBSQ NF CARE LOW MDM 20: CPT | Performed by: INTERNAL MEDICINE

## 2024-04-23 NOTE — LETTER
Patient: Silva Corrigan  : 1955    Encounter Date: 2024    Pt was seen in the NH.  Pt is in usual state , no complaint  General appearance: Comfortable, no distress  ROS: No SOB  Medications reviewed  Head: Normal  Neck: Soft  Heart: Regular  Lungs: Clear  Abdomen: soft    Plan:   1)clinically doing fine  2) To continue jardiance 10 mg dialy glipizide 10 mg daily    Problem List Items Addressed This Visit       Diabetes mellitus, type 2 (Multi) - Primary          Electronically Signed By: Chevy Ramachandran MD   24  7:10 PM

## 2024-04-23 NOTE — PROGRESS NOTES
Pt was seen in the NH.  Pt is in usual state , no complaint  General appearance: Comfortable, no distress  ROS: No SOB  Medications reviewed  Head: Normal  Neck: Soft  Heart: Regular  Lungs: Clear  Abdomen: soft    Plan:   1)clinically doing fine  2) To continue jardiance 10 mg dialy glipizide 10 mg daily    Problem List Items Addressed This Visit       Diabetes mellitus, type 2 (Multi) - Primary

## 2024-05-20 ENCOUNTER — LAB REQUISITION (OUTPATIENT)
Dept: LAB | Facility: HOSPITAL | Age: 69
End: 2024-05-20
Payer: MEDICARE

## 2024-05-20 DIAGNOSIS — R41.82 ALTERED MENTAL STATUS, UNSPECIFIED: ICD-10-CM

## 2024-05-20 LAB
APPEARANCE UR: ABNORMAL
BACTERIA #/AREA URNS AUTO: ABNORMAL /HPF
BILIRUB UR STRIP.AUTO-MCNC: NEGATIVE MG/DL
COLOR UR: YELLOW
GLUCOSE UR STRIP.AUTO-MCNC: ABNORMAL MG/DL
KETONES UR STRIP.AUTO-MCNC: NEGATIVE MG/DL
LEUKOCYTE ESTERASE UR QL STRIP.AUTO: ABNORMAL
MUCOUS THREADS #/AREA URNS AUTO: ABNORMAL /LPF
NITRITE UR QL STRIP.AUTO: NEGATIVE
PH UR STRIP.AUTO: 6 [PH]
PROT UR STRIP.AUTO-MCNC: ABNORMAL MG/DL
RBC # UR STRIP.AUTO: NEGATIVE /UL
RBC #/AREA URNS AUTO: >20 /HPF
SP GR UR STRIP.AUTO: 1.03
SQUAMOUS #/AREA URNS AUTO: ABNORMAL /HPF
UROBILINOGEN UR STRIP.AUTO-MCNC: 2 MG/DL
WBC #/AREA URNS AUTO: >50 /HPF
WBC CLUMPS #/AREA URNS AUTO: ABNORMAL /HPF
YEAST BUDDING #/AREA UR COMP ASSIST: PRESENT /HPF

## 2024-05-20 PROCEDURE — 87086 URINE CULTURE/COLONY COUNT: CPT | Mod: OUT,SAMLAB | Performed by: NURSE PRACTITIONER

## 2024-05-20 PROCEDURE — 81001 URINALYSIS AUTO W/SCOPE: CPT | Mod: OUT | Performed by: NURSE PRACTITIONER

## 2024-05-21 LAB — BACTERIA UR CULT: NORMAL

## 2024-05-28 ENCOUNTER — NURSING HOME VISIT (OUTPATIENT)
Dept: POST ACUTE CARE | Facility: EXTERNAL LOCATION | Age: 69
End: 2024-05-28
Payer: MEDICARE

## 2024-05-28 DIAGNOSIS — R60.9 PERIPHERAL EDEMA: Primary | ICD-10-CM

## 2024-05-28 PROCEDURE — 99309 SBSQ NF CARE MODERATE MDM 30: CPT | Performed by: NURSE PRACTITIONER

## 2024-05-28 NOTE — LETTER
Patient: Silva Corrigan  : 1955    Encounter Date: 2024    Subjective  Patient ID: Silva Corrigan is a 68 y.o. female who presents for No chief complaint on file..  69 yo female at [facility] with history of dementia, DMII, HTN, polyneuropathy.  Staff reports resident has developed edema in bilateral lower extremities.  Resident admits her legs are swollen and feels tight and painful to walk at times.          Review of Systems   Constitutional:  Negative for chills and fever.   Respiratory: Negative.     Cardiovascular:  Positive for leg swelling. Negative for chest pain and palpitations.   Gastrointestinal: Negative.    Skin:  Negative for color change, rash and wound.   Psychiatric/Behavioral:  Positive for confusion.        Objective  Physical Exam  Constitutional:       General: She is not in acute distress.  Cardiovascular:      Rate and Rhythm: Normal rate and regular rhythm.   Pulmonary:      Effort: Pulmonary effort is normal.      Breath sounds: Normal breath sounds.   Abdominal:      Tenderness: There is no abdominal tenderness.   Musculoskeletal:      Right lower leg: Edema present.      Left lower leg: Edema present.   Skin:     General: Skin is warm and dry.      Findings: No erythema.   Neurological:      Mental Status: She is alert.           Current Outpatient Medications:   •  acetaminophen (Tylenol) 500 mg tablet, Take 1 tablet (500 mg) by mouth every 6 hours if needed., Disp: , Rfl:   •  aspirin 81 mg chewable tablet, Chew 1 tablet (81 mg)., Disp: , Rfl:   •  atorvastatin (Lipitor) 20 mg tablet, Take 1 tablet (20 mg) by mouth once daily at bedtime., Disp: , Rfl:   •  blood sugar diagnostic (Blood Glucose Test) strip, 100 strips 2 times a day., Disp: 100 strip, Rfl: 11  •  BLOOD SUGAR DIAGNOSTIC MISC, by in vitro route., Disp: , Rfl:   •  blood sugar diagnostic strip, , Disp: , Rfl:   •  buPROPion XL (Wellbutrin XL) 300 mg 24 hr tablet, TAKE 1 TABLET BY MOUTH DAILY, Disp: 90 tablet, Rfl: 1  •   calcium carbonate-vitamin D3 600 mg-10 mcg (400 unit) chewable tablet, Chew 1 tablet once daily., Disp: , Rfl:   •  cyanocobalamin (Vitamin B-12) 1,000 mcg tablet, TAKE ONE TABLET BY MOUTH DAILY, Disp: 90 tablet, Rfl: 1  •  desvenlafaxine 100 mg 24 hr tablet, Take 1 tablet (100 mg) by mouth once daily., Disp: 90 tablet, Rfl: 1  •  esomeprazole (NexIUM) 40 mg DR capsule, Take 1 capsule (40 mg) by mouth once daily in the morning. Take before meals., Disp: 90 capsule, Rfl: 1  •  fluticasone (Flonase) 50 mcg/actuation nasal spray, Administer 2 sprays into each nostril once daily., Disp: , Rfl:   •  FreeStyle glucose monitoring kit, Test BID, Disp: 1 each, Rfl: 0  •  gabapentin (Neurontin) 300 mg capsule, Take 1 capsule (300 mg) by mouth 3 times a day., Disp: 90 capsule, Rfl: 5  •  glipiZIDE XL (Glucotrol XL) 10 mg 24 hr tablet, Take 1 tablet (10 mg) by mouth once daily. Do not crush, chew, or split., Disp: 30 tablet, Rfl: 11  •  hydrOXYzine pamoate (Vistaril) 25 mg capsule, Take 1 capsule (25 mg) by mouth 3 times a day as needed for anxiety., Disp: 90 capsule, Rfl: 0  •  hydrOXYzine pamoate (Vistaril) 25 mg capsule, Take 1 capsule (25 mg) by mouth 3 times a day as needed for anxiety., Disp: 30 capsule, Rfl: 3  •  lancets misc, Test twice daily, Disp: 100 each, Rfl: 11  •  loperamide (Imodium) 2 mg capsule, Take by mouth., Disp: , Rfl:   •  losartan (Cozaar) 50 mg tablet, Take 1 tablet (50 mg) by mouth once daily., Disp: 30 tablet, Rfl: 11  •  metoprolol tartrate (Lopressor) 100 mg tablet, Take 1 tablet (100 mg) by mouth 2 times a day., Disp: 60 tablet, Rfl: 5  •  nitroglycerin (Nitrostat) 0.4 mg SL tablet, Place 1 tablet (0.4 mg) under the tongue every 5 minutes if needed for chest pain., Disp: 30 tablet, Rfl: 1  •  rosuvastatin (Crestor) 5 mg tablet, TAKE ONE TABLET BY MOUTH DAILY, Disp: 90 tablet, Rfl: 1  •  traMADol (Ultram) 50 mg tablet, Take 1 tablet (50 mg) by mouth every 6 hours if needed for moderate pain (4 -  6)., Disp: 10 tablet, Rfl: 0  •  traZODone (Desyrel) 50 mg tablet, TAKE ONE TABLET BY MOUTH EVERY NIGHT AT BEDTIME, Disp: 90 tablet, Rfl: 1     Assessment/Plan  Problem List Items Addressed This Visit    None  Visit Diagnoses       Peripheral edema    -  Primary        Chart and labs reviewed.  Start on Lasix 20mg daily and get BMP in 1 wk.  Continue to monitor.             Electronically Signed By: ROWENA Barahona   5/30/24  8:00 PM

## 2024-05-30 ENCOUNTER — NURSING HOME VISIT (OUTPATIENT)
Dept: POST ACUTE CARE | Facility: EXTERNAL LOCATION | Age: 69
End: 2024-05-30
Payer: MEDICARE

## 2024-05-30 DIAGNOSIS — I10 PRIMARY HYPERTENSION: Primary | ICD-10-CM

## 2024-05-30 PROCEDURE — 99308 SBSQ NF CARE LOW MDM 20: CPT | Performed by: INTERNAL MEDICINE

## 2024-05-30 ASSESSMENT — ENCOUNTER SYMPTOMS
COLOR CHANGE: 0
CONFUSION: 1
CHILLS: 0
FEVER: 0
PALPITATIONS: 0
RESPIRATORY NEGATIVE: 1
GASTROINTESTINAL NEGATIVE: 1
WOUND: 0

## 2024-05-30 NOTE — PROGRESS NOTES
Pt was seen in the NH.  Pt is in usual state , no complaint  General appearance: Comfortable, no distress  ROS: No SOB  Medications reviewed  Head: Normal  Neck: Soft  Heart: Regular  Lungs: Clear  Abdomen: soft    Plan:   1)clinically doing fine  2) To continue Losartan 50 mg daily    Problem List Items Addressed This Visit       Primary hypertension - Primary

## 2024-05-30 NOTE — PROGRESS NOTES
Subjective   Patient ID: Silva Corrigan is a 68 y.o. female who presents for No chief complaint on file..  69 yo female at \[facility]\"" with history of dementia, DMII, HTN, polyneuropathy.  Staff reports resident has developed edema in bilateral lower extremities.  Resident admits her legs are swollen and feels tight and painful to walk at times.          Review of Systems   Constitutional:  Negative for chills and fever.   Respiratory: Negative.     Cardiovascular:  Positive for leg swelling. Negative for chest pain and palpitations.   Gastrointestinal: Negative.    Skin:  Negative for color change, rash and wound.   Psychiatric/Behavioral:  Positive for confusion.        Objective   Physical Exam  Constitutional:       General: She is not in acute distress.  Cardiovascular:      Rate and Rhythm: Normal rate and regular rhythm.   Pulmonary:      Effort: Pulmonary effort is normal.      Breath sounds: Normal breath sounds.   Abdominal:      Tenderness: There is no abdominal tenderness.   Musculoskeletal:      Right lower leg: Edema present.      Left lower leg: Edema present.   Skin:     General: Skin is warm and dry.      Findings: No erythema.   Neurological:      Mental Status: She is alert.           Current Outpatient Medications:     acetaminophen (Tylenol) 500 mg tablet, Take 1 tablet (500 mg) by mouth every 6 hours if needed., Disp: , Rfl:     aspirin 81 mg chewable tablet, Chew 1 tablet (81 mg)., Disp: , Rfl:     atorvastatin (Lipitor) 20 mg tablet, Take 1 tablet (20 mg) by mouth once daily at bedtime., Disp: , Rfl:     blood sugar diagnostic (Blood Glucose Test) strip, 100 strips 2 times a day., Disp: 100 strip, Rfl: 11    BLOOD SUGAR DIAGNOSTIC MISC, by in vitro route., Disp: , Rfl:     blood sugar diagnostic strip, , Disp: , Rfl:     buPROPion XL (Wellbutrin XL) 300 mg 24 hr tablet, TAKE 1 TABLET BY MOUTH DAILY, Disp: 90 tablet, Rfl: 1    calcium carbonate-vitamin D3 600 mg-10 mcg (400 unit) chewable tablet, Chew 1  tablet once daily., Disp: , Rfl:     cyanocobalamin (Vitamin B-12) 1,000 mcg tablet, TAKE ONE TABLET BY MOUTH DAILY, Disp: 90 tablet, Rfl: 1    desvenlafaxine 100 mg 24 hr tablet, Take 1 tablet (100 mg) by mouth once daily., Disp: 90 tablet, Rfl: 1    esomeprazole (NexIUM) 40 mg DR capsule, Take 1 capsule (40 mg) by mouth once daily in the morning. Take before meals., Disp: 90 capsule, Rfl: 1    fluticasone (Flonase) 50 mcg/actuation nasal spray, Administer 2 sprays into each nostril once daily., Disp: , Rfl:     FreeStyle glucose monitoring kit, Test BID, Disp: 1 each, Rfl: 0    gabapentin (Neurontin) 300 mg capsule, Take 1 capsule (300 mg) by mouth 3 times a day., Disp: 90 capsule, Rfl: 5    glipiZIDE XL (Glucotrol XL) 10 mg 24 hr tablet, Take 1 tablet (10 mg) by mouth once daily. Do not crush, chew, or split., Disp: 30 tablet, Rfl: 11    hydrOXYzine pamoate (Vistaril) 25 mg capsule, Take 1 capsule (25 mg) by mouth 3 times a day as needed for anxiety., Disp: 90 capsule, Rfl: 0    hydrOXYzine pamoate (Vistaril) 25 mg capsule, Take 1 capsule (25 mg) by mouth 3 times a day as needed for anxiety., Disp: 30 capsule, Rfl: 3    lancets misc, Test twice daily, Disp: 100 each, Rfl: 11    loperamide (Imodium) 2 mg capsule, Take by mouth., Disp: , Rfl:     losartan (Cozaar) 50 mg tablet, Take 1 tablet (50 mg) by mouth once daily., Disp: 30 tablet, Rfl: 11    metoprolol tartrate (Lopressor) 100 mg tablet, Take 1 tablet (100 mg) by mouth 2 times a day., Disp: 60 tablet, Rfl: 5    nitroglycerin (Nitrostat) 0.4 mg SL tablet, Place 1 tablet (0.4 mg) under the tongue every 5 minutes if needed for chest pain., Disp: 30 tablet, Rfl: 1    rosuvastatin (Crestor) 5 mg tablet, TAKE ONE TABLET BY MOUTH DAILY, Disp: 90 tablet, Rfl: 1    traMADol (Ultram) 50 mg tablet, Take 1 tablet (50 mg) by mouth every 6 hours if needed for moderate pain (4 - 6)., Disp: 10 tablet, Rfl: 0    traZODone (Desyrel) 50 mg tablet, TAKE ONE TABLET BY MOUTH  EVERY NIGHT AT BEDTIME, Disp: 90 tablet, Rfl: 1     Assessment/Plan   Problem List Items Addressed This Visit    None  Visit Diagnoses       Peripheral edema    -  Primary        Chart and labs reviewed.  Start on Lasix 20mg daily and get BMP in 1 wk.  Continue to monitor.

## 2024-05-30 NOTE — LETTER
Patient: Silva Corrigan  : 1955    Encounter Date: 2024    Pt was seen in the [facility].  Pt is in usual state , no complaint  General appearance: Comfortable, no distress  ROS: No SOB  Medications reviewed  Head: Normal  Neck: Soft  Heart: Regular  Lungs: Clear  Abdomen: soft    Plan:   1)clinically doing fine  2) To continue Losartan 50 mg daily    Problem List Items Addressed This Visit       Primary hypertension - Primary          Electronically Signed By: Chevy Ramachandran MD   24  5:41 PM

## 2024-06-14 ENCOUNTER — NURSING HOME VISIT (OUTPATIENT)
Dept: POST ACUTE CARE | Facility: EXTERNAL LOCATION | Age: 69
End: 2024-06-14
Payer: MEDICARE

## 2024-06-14 DIAGNOSIS — R60.9 PERIPHERAL EDEMA: Primary | ICD-10-CM

## 2024-06-14 PROCEDURE — 99308 SBSQ NF CARE LOW MDM 20: CPT | Performed by: NURSE PRACTITIONER

## 2024-06-14 NOTE — LETTER
Patient: Silva Corrigan  : 1955    Encounter Date: 2024    Subjective  Patient ID: Silva Corrigan is a 68 y.o. female who presents for No chief complaint on file..  Follow up on peripheral edema.  Resident states she is feeling better and legs are not as swollen.          Review of Systems   Constitutional:  Negative for chills and fever.   Respiratory: Negative.     Cardiovascular: Negative.    Gastrointestinal: Negative.    Psychiatric/Behavioral:  Positive for confusion.        Objective  Physical Exam  Constitutional:       General: She is not in acute distress.     Appearance: She is not ill-appearing.   Cardiovascular:      Rate and Rhythm: Normal rate and regular rhythm.   Pulmonary:      Effort: Pulmonary effort is normal.      Breath sounds: Normal breath sounds.   Musculoskeletal:      Right lower leg: No edema.      Left lower leg: No edema.      Comments: Edema in bilateral lower extremities are resolved.     Neurological:      Mental Status: She is alert.           Current Outpatient Medications:   •  acetaminophen (Tylenol) 500 mg tablet, Take 1 tablet (500 mg) by mouth every 6 hours if needed., Disp: , Rfl:   •  aspirin 81 mg chewable tablet, Chew 1 tablet (81 mg)., Disp: , Rfl:   •  atorvastatin (Lipitor) 20 mg tablet, Take 1 tablet (20 mg) by mouth once daily at bedtime., Disp: , Rfl:   •  blood sugar diagnostic (Blood Glucose Test) strip, 100 strips 2 times a day., Disp: 100 strip, Rfl: 11  •  BLOOD SUGAR DIAGNOSTIC MISC, by in vitro route., Disp: , Rfl:   •  blood sugar diagnostic strip, , Disp: , Rfl:   •  buPROPion XL (Wellbutrin XL) 300 mg 24 hr tablet, TAKE 1 TABLET BY MOUTH DAILY, Disp: 90 tablet, Rfl: 1  •  calcium carbonate-vitamin D3 600 mg-10 mcg (400 unit) chewable tablet, Chew 1 tablet once daily., Disp: , Rfl:   •  cyanocobalamin (Vitamin B-12) 1,000 mcg tablet, TAKE ONE TABLET BY MOUTH DAILY, Disp: 90 tablet, Rfl: 1  •  desvenlafaxine 100 mg 24 hr tablet, Take 1 tablet (100  mg) by mouth once daily., Disp: 90 tablet, Rfl: 1  •  esomeprazole (NexIUM) 40 mg DR capsule, Take 1 capsule (40 mg) by mouth once daily in the morning. Take before meals., Disp: 90 capsule, Rfl: 1  •  fluticasone (Flonase) 50 mcg/actuation nasal spray, Administer 2 sprays into each nostril once daily., Disp: , Rfl:   •  FreeStyle glucose monitoring kit, Test BID, Disp: 1 each, Rfl: 0  •  gabapentin (Neurontin) 300 mg capsule, Take 1 capsule (300 mg) by mouth 3 times a day., Disp: 90 capsule, Rfl: 5  •  glipiZIDE XL (Glucotrol XL) 10 mg 24 hr tablet, Take 1 tablet (10 mg) by mouth once daily. Do not crush, chew, or split., Disp: 30 tablet, Rfl: 11  •  hydrOXYzine pamoate (Vistaril) 25 mg capsule, Take 1 capsule (25 mg) by mouth 3 times a day as needed for anxiety., Disp: 90 capsule, Rfl: 0  •  hydrOXYzine pamoate (Vistaril) 25 mg capsule, Take 1 capsule (25 mg) by mouth 3 times a day as needed for anxiety., Disp: 30 capsule, Rfl: 3  •  lancets misc, Test twice daily, Disp: 100 each, Rfl: 11  •  loperamide (Imodium) 2 mg capsule, Take by mouth., Disp: , Rfl:   •  losartan (Cozaar) 50 mg tablet, Take 1 tablet (50 mg) by mouth once daily., Disp: 30 tablet, Rfl: 11  •  metoprolol tartrate (Lopressor) 100 mg tablet, Take 1 tablet (100 mg) by mouth 2 times a day., Disp: 60 tablet, Rfl: 5  •  nitroglycerin (Nitrostat) 0.4 mg SL tablet, Place 1 tablet (0.4 mg) under the tongue every 5 minutes if needed for chest pain., Disp: 30 tablet, Rfl: 1  •  rosuvastatin (Crestor) 5 mg tablet, TAKE ONE TABLET BY MOUTH DAILY, Disp: 90 tablet, Rfl: 1  •  traMADol (Ultram) 50 mg tablet, Take 1 tablet (50 mg) by mouth every 6 hours if needed for moderate pain (4 - 6)., Disp: 10 tablet, Rfl: 0  •  traZODone (Desyrel) 50 mg tablet, TAKE ONE TABLET BY MOUTH EVERY NIGHT AT BEDTIME, Disp: 90 tablet, Rfl: 1     Assessment/Plan  Problem List Items Addressed This Visit          Symptoms and Signs    Peripheral edema - Primary   Resolved.  Will get  labs tomorrow and continue to monitor.             Electronically Signed By: ROWENA Barahona   6/17/24 11:31 AM

## 2024-06-17 PROBLEM — R60.0 PERIPHERAL EDEMA: Status: ACTIVE | Noted: 2024-06-17

## 2024-06-17 PROBLEM — R60.9 PERIPHERAL EDEMA: Status: ACTIVE | Noted: 2024-06-17

## 2024-06-17 ASSESSMENT — ENCOUNTER SYMPTOMS
RESPIRATORY NEGATIVE: 1
FEVER: 0
CONFUSION: 1
GASTROINTESTINAL NEGATIVE: 1
CHILLS: 0
CARDIOVASCULAR NEGATIVE: 1

## 2024-06-17 NOTE — PROGRESS NOTES
Subjective   Patient ID: Silva Corrigan is a 68 y.o. female who presents for No chief complaint on file..  Follow up on peripheral edema.  Resident states she is feeling better and legs are not as swollen.          Review of Systems   Constitutional:  Negative for chills and fever.   Respiratory: Negative.     Cardiovascular: Negative.    Gastrointestinal: Negative.    Psychiatric/Behavioral:  Positive for confusion.        Objective   Physical Exam  Constitutional:       General: She is not in acute distress.     Appearance: She is not ill-appearing.   Cardiovascular:      Rate and Rhythm: Normal rate and regular rhythm.   Pulmonary:      Effort: Pulmonary effort is normal.      Breath sounds: Normal breath sounds.   Musculoskeletal:      Right lower leg: No edema.      Left lower leg: No edema.      Comments: Edema in bilateral lower extremities are resolved.     Neurological:      Mental Status: She is alert.           Current Outpatient Medications:     acetaminophen (Tylenol) 500 mg tablet, Take 1 tablet (500 mg) by mouth every 6 hours if needed., Disp: , Rfl:     aspirin 81 mg chewable tablet, Chew 1 tablet (81 mg)., Disp: , Rfl:     atorvastatin (Lipitor) 20 mg tablet, Take 1 tablet (20 mg) by mouth once daily at bedtime., Disp: , Rfl:     blood sugar diagnostic (Blood Glucose Test) strip, 100 strips 2 times a day., Disp: 100 strip, Rfl: 11    BLOOD SUGAR DIAGNOSTIC MISC, by in vitro route., Disp: , Rfl:     blood sugar diagnostic strip, , Disp: , Rfl:     buPROPion XL (Wellbutrin XL) 300 mg 24 hr tablet, TAKE 1 TABLET BY MOUTH DAILY, Disp: 90 tablet, Rfl: 1    calcium carbonate-vitamin D3 600 mg-10 mcg (400 unit) chewable tablet, Chew 1 tablet once daily., Disp: , Rfl:     cyanocobalamin (Vitamin B-12) 1,000 mcg tablet, TAKE ONE TABLET BY MOUTH DAILY, Disp: 90 tablet, Rfl: 1    desvenlafaxine 100 mg 24 hr tablet, Take 1 tablet (100 mg) by mouth once daily., Disp: 90 tablet, Rfl: 1    esomeprazole (NexIUM) 40  mg DR capsule, Take 1 capsule (40 mg) by mouth once daily in the morning. Take before meals., Disp: 90 capsule, Rfl: 1    fluticasone (Flonase) 50 mcg/actuation nasal spray, Administer 2 sprays into each nostril once daily., Disp: , Rfl:     FreeStyle glucose monitoring kit, Test BID, Disp: 1 each, Rfl: 0    gabapentin (Neurontin) 300 mg capsule, Take 1 capsule (300 mg) by mouth 3 times a day., Disp: 90 capsule, Rfl: 5    glipiZIDE XL (Glucotrol XL) 10 mg 24 hr tablet, Take 1 tablet (10 mg) by mouth once daily. Do not crush, chew, or split., Disp: 30 tablet, Rfl: 11    hydrOXYzine pamoate (Vistaril) 25 mg capsule, Take 1 capsule (25 mg) by mouth 3 times a day as needed for anxiety., Disp: 90 capsule, Rfl: 0    hydrOXYzine pamoate (Vistaril) 25 mg capsule, Take 1 capsule (25 mg) by mouth 3 times a day as needed for anxiety., Disp: 30 capsule, Rfl: 3    lancets misc, Test twice daily, Disp: 100 each, Rfl: 11    loperamide (Imodium) 2 mg capsule, Take by mouth., Disp: , Rfl:     losartan (Cozaar) 50 mg tablet, Take 1 tablet (50 mg) by mouth once daily., Disp: 30 tablet, Rfl: 11    metoprolol tartrate (Lopressor) 100 mg tablet, Take 1 tablet (100 mg) by mouth 2 times a day., Disp: 60 tablet, Rfl: 5    nitroglycerin (Nitrostat) 0.4 mg SL tablet, Place 1 tablet (0.4 mg) under the tongue every 5 minutes if needed for chest pain., Disp: 30 tablet, Rfl: 1    rosuvastatin (Crestor) 5 mg tablet, TAKE ONE TABLET BY MOUTH DAILY, Disp: 90 tablet, Rfl: 1    traMADol (Ultram) 50 mg tablet, Take 1 tablet (50 mg) by mouth every 6 hours if needed for moderate pain (4 - 6)., Disp: 10 tablet, Rfl: 0    traZODone (Desyrel) 50 mg tablet, TAKE ONE TABLET BY MOUTH EVERY NIGHT AT BEDTIME, Disp: 90 tablet, Rfl: 1     Assessment/Plan   Problem List Items Addressed This Visit          Symptoms and Signs    Peripheral edema - Primary   Resolved.  Will get labs tomorrow and continue to monitor.

## 2024-06-20 ENCOUNTER — NURSING HOME VISIT (OUTPATIENT)
Dept: POST ACUTE CARE | Facility: EXTERNAL LOCATION | Age: 69
End: 2024-06-20
Payer: MEDICARE

## 2024-06-20 DIAGNOSIS — M54.50 LOW BACK PAIN WITHOUT SCIATICA, UNSPECIFIED BACK PAIN LATERALITY, UNSPECIFIED CHRONICITY: Primary | ICD-10-CM

## 2024-06-20 DIAGNOSIS — M25.511 BILATERAL SHOULDER PAIN, UNSPECIFIED CHRONICITY: ICD-10-CM

## 2024-06-20 DIAGNOSIS — M25.512 BILATERAL SHOULDER PAIN, UNSPECIFIED CHRONICITY: ICD-10-CM

## 2024-06-20 PROCEDURE — 99308 SBSQ NF CARE LOW MDM 20: CPT | Performed by: INTERNAL MEDICINE

## 2024-06-20 NOTE — PROGRESS NOTES
Pt was seen in the NH.  Pt co lBP and b/l shoulder pain  General appearance: Comfortable, no distress  ROS: No SOB  Medications reviewed  Head: Normal  Neck: Soft  Heart: Regular  Lungs: Clear  Abdomen: soft  Back mild lower tenderness  Ext normal ROM no tenderness    Plan:   1)CBC ESR LS spine XR shoulder XR  2) Tylenol    Problem List Items Addressed This Visit    None  Visit Diagnoses       Low back pain without sciatica, unspecified back pain laterality, unspecified chronicity    -  Primary    Bilateral shoulder pain, unspecified chronicity

## 2024-06-20 NOTE — LETTER
Patient: Silva Corrigan  : 1955    Encounter Date: 2024    Pt was seen in the NH.  Pt co lBP and b/l shoulder pain  General appearance: Comfortable, no distress  ROS: No SOB  Medications reviewed  Head: Normal  Neck: Soft  Heart: Regular  Lungs: Clear  Abdomen: soft    Plan:   1)CBC ESR LS spine XR shoulder XR  2) Tylenol    Problem List Items Addressed This Visit    None  Visit Diagnoses       Low back pain without sciatica, unspecified back pain laterality, unspecified chronicity    -  Primary    Bilateral shoulder pain, unspecified chronicity                   Electronically Signed By: Chevy Ramachandran MD   24  4:34 PM

## 2024-06-24 ENCOUNTER — NURSING HOME VISIT (OUTPATIENT)
Dept: POST ACUTE CARE | Facility: EXTERNAL LOCATION | Age: 69
End: 2024-06-24
Payer: MEDICARE

## 2024-06-24 DIAGNOSIS — M54.2 CERVICALGIA: Primary | ICD-10-CM

## 2024-06-24 DIAGNOSIS — M19.011 OSTEOARTHRITIS OF BOTH SHOULDERS, UNSPECIFIED OSTEOARTHRITIS TYPE: ICD-10-CM

## 2024-06-24 DIAGNOSIS — M19.012 OSTEOARTHRITIS OF BOTH SHOULDERS, UNSPECIFIED OSTEOARTHRITIS TYPE: ICD-10-CM

## 2024-06-24 PROCEDURE — 99308 SBSQ NF CARE LOW MDM 20: CPT | Performed by: NURSE PRACTITIONER

## 2024-06-24 NOTE — LETTER
Patient: Silva Corrigan  : 1955    Encounter Date: 2024    Subjective  Patient ID: Silva Corrigan is a 68 y.o. female who presents for No chief complaint on file..  67 yo female at \[facility]\"" complains of bilateral shoulder pain and neck pain.  States neck feels stiff.  Shoulder x-rays and LS spine x-ray performed and shows no acute abnormalities.  States she has difficulty with rom of arms and neck.  Denies numbness or tingling of arms or legs.          Review of Systems   Constitutional:  Negative for chills and fever.   Respiratory: Negative.     Cardiovascular: Negative.    Musculoskeletal:  Positive for arthralgias, back pain, gait problem, neck pain and neck stiffness. Negative for joint swelling.   Neurological:  Negative for dizziness, tremors, weakness, numbness and headaches.       Objective  Physical Exam  Constitutional:       General: She is not in acute distress.  Cardiovascular:      Rate and Rhythm: Normal rate and regular rhythm.   Pulmonary:      Effort: Pulmonary effort is normal.      Breath sounds: Normal breath sounds.   Musculoskeletal:         General: No swelling or tenderness.      Comments: Full ROM of neck.  States stiffer when turning head to right.  No pain on ROM.  Strength 4/5 upper extremities.     Neurological:      Mental Status: She is alert.           Current Outpatient Medications:   •  acetaminophen (Tylenol) 500 mg tablet, Take 1 tablet (500 mg) by mouth every 6 hours if needed., Disp: , Rfl:   •  aspirin 81 mg chewable tablet, Chew 1 tablet (81 mg)., Disp: , Rfl:   •  atorvastatin (Lipitor) 20 mg tablet, Take 1 tablet (20 mg) by mouth once daily at bedtime., Disp: , Rfl:   •  blood sugar diagnostic (Blood Glucose Test) strip, 100 strips 2 times a day., Disp: 100 strip, Rfl: 11  •  BLOOD SUGAR DIAGNOSTIC MISC, by in vitro route., Disp: , Rfl:   •  blood sugar diagnostic strip, , Disp: , Rfl:   •  buPROPion XL (Wellbutrin XL) 300 mg 24 hr tablet, TAKE 1 TABLET BY MOUTH DAILY,  Disp: 90 tablet, Rfl: 1  •  calcium carbonate-vitamin D3 600 mg-10 mcg (400 unit) chewable tablet, Chew 1 tablet once daily., Disp: , Rfl:   •  cyanocobalamin (Vitamin B-12) 1,000 mcg tablet, TAKE ONE TABLET BY MOUTH DAILY, Disp: 90 tablet, Rfl: 1  •  desvenlafaxine 100 mg 24 hr tablet, Take 1 tablet (100 mg) by mouth once daily., Disp: 90 tablet, Rfl: 1  •  esomeprazole (NexIUM) 40 mg DR capsule, Take 1 capsule (40 mg) by mouth once daily in the morning. Take before meals., Disp: 90 capsule, Rfl: 1  •  fluticasone (Flonase) 50 mcg/actuation nasal spray, Administer 2 sprays into each nostril once daily., Disp: , Rfl:   •  FreeStyle glucose monitoring kit, Test BID, Disp: 1 each, Rfl: 0  •  gabapentin (Neurontin) 300 mg capsule, Take 1 capsule (300 mg) by mouth 3 times a day., Disp: 90 capsule, Rfl: 5  •  glipiZIDE XL (Glucotrol XL) 10 mg 24 hr tablet, Take 1 tablet (10 mg) by mouth once daily. Do not crush, chew, or split., Disp: 30 tablet, Rfl: 11  •  hydrOXYzine pamoate (Vistaril) 25 mg capsule, Take 1 capsule (25 mg) by mouth 3 times a day as needed for anxiety., Disp: 90 capsule, Rfl: 0  •  hydrOXYzine pamoate (Vistaril) 25 mg capsule, Take 1 capsule (25 mg) by mouth 3 times a day as needed for anxiety., Disp: 30 capsule, Rfl: 3  •  lancets misc, Test twice daily, Disp: 100 each, Rfl: 11  •  loperamide (Imodium) 2 mg capsule, Take by mouth., Disp: , Rfl:   •  losartan (Cozaar) 50 mg tablet, Take 1 tablet (50 mg) by mouth once daily., Disp: 30 tablet, Rfl: 11  •  metoprolol tartrate (Lopressor) 100 mg tablet, Take 1 tablet (100 mg) by mouth 2 times a day., Disp: 60 tablet, Rfl: 5  •  nitroglycerin (Nitrostat) 0.4 mg SL tablet, Place 1 tablet (0.4 mg) under the tongue every 5 minutes if needed for chest pain., Disp: 30 tablet, Rfl: 1  •  rosuvastatin (Crestor) 5 mg tablet, TAKE ONE TABLET BY MOUTH DAILY, Disp: 90 tablet, Rfl: 1  •  traMADol (Ultram) 50 mg tablet, Take 1 tablet (50 mg) by mouth every 6 hours if  needed for moderate pain (4 - 6)., Disp: 10 tablet, Rfl: 0  •  traZODone (Desyrel) 50 mg tablet, TAKE ONE TABLET BY MOUTH EVERY NIGHT AT BEDTIME, Disp: 90 tablet, Rfl: 1     Assessment/Plan  Problem List Items Addressed This Visit    None  Visit Diagnoses       Cervicalgia    -  Primary    Osteoarthritis of both shoulders, unspecified osteoarthritis type            Get cervical x-ray.  PT to work with shoulders and neck.  Use prn pain meds as directed.  Can use heat or ice prn.  May need Biofreeze or Voltaren Gel prn.           Electronically Signed By: LISA Barahona-CNP   6/28/24  2:54 PM

## 2024-06-28 ASSESSMENT — ENCOUNTER SYMPTOMS
HEADACHES: 0
WEAKNESS: 0
NECK STIFFNESS: 1
CARDIOVASCULAR NEGATIVE: 1
TREMORS: 0
RESPIRATORY NEGATIVE: 1
NUMBNESS: 0
CHILLS: 0
DIZZINESS: 0
ARTHRALGIAS: 1
NECK PAIN: 1
JOINT SWELLING: 0
BACK PAIN: 1
FEVER: 0

## 2024-06-28 NOTE — PROGRESS NOTES
Subjective   Patient ID: Silva Corrigan is a 68 y.o. female who presents for No chief complaint on file..  69 yo female at [facility] complains of bilateral shoulder pain and neck pain.  States neck feels stiff.  Shoulder x-rays and LS spine x-ray performed and shows no acute abnormalities.  States she has difficulty with rom of arms and neck.  Denies numbness or tingling of arms or legs.          Review of Systems   Constitutional:  Negative for chills and fever.   Respiratory: Negative.     Cardiovascular: Negative.    Musculoskeletal:  Positive for arthralgias, back pain, gait problem, neck pain and neck stiffness. Negative for joint swelling.   Neurological:  Negative for dizziness, tremors, weakness, numbness and headaches.       Objective   Physical Exam  Constitutional:       General: She is not in acute distress.  Cardiovascular:      Rate and Rhythm: Normal rate and regular rhythm.   Pulmonary:      Effort: Pulmonary effort is normal.      Breath sounds: Normal breath sounds.   Musculoskeletal:         General: No swelling or tenderness.      Comments: Full ROM of neck.  States stiffer when turning head to right.  No pain on ROM.  Strength 4/5 upper extremities.     Neurological:      Mental Status: She is alert.           Current Outpatient Medications:     acetaminophen (Tylenol) 500 mg tablet, Take 1 tablet (500 mg) by mouth every 6 hours if needed., Disp: , Rfl:     aspirin 81 mg chewable tablet, Chew 1 tablet (81 mg)., Disp: , Rfl:     atorvastatin (Lipitor) 20 mg tablet, Take 1 tablet (20 mg) by mouth once daily at bedtime., Disp: , Rfl:     blood sugar diagnostic (Blood Glucose Test) strip, 100 strips 2 times a day., Disp: 100 strip, Rfl: 11    BLOOD SUGAR DIAGNOSTIC MISC, by in vitro route., Disp: , Rfl:     blood sugar diagnostic strip, , Disp: , Rfl:     buPROPion XL (Wellbutrin XL) 300 mg 24 hr tablet, TAKE 1 TABLET BY MOUTH DAILY, Disp: 90 tablet, Rfl: 1    calcium carbonate-vitamin D3 600 mg-10 mcg (400  unit) chewable tablet, Chew 1 tablet once daily., Disp: , Rfl:     cyanocobalamin (Vitamin B-12) 1,000 mcg tablet, TAKE ONE TABLET BY MOUTH DAILY, Disp: 90 tablet, Rfl: 1    desvenlafaxine 100 mg 24 hr tablet, Take 1 tablet (100 mg) by mouth once daily., Disp: 90 tablet, Rfl: 1    esomeprazole (NexIUM) 40 mg DR capsule, Take 1 capsule (40 mg) by mouth once daily in the morning. Take before meals., Disp: 90 capsule, Rfl: 1    fluticasone (Flonase) 50 mcg/actuation nasal spray, Administer 2 sprays into each nostril once daily., Disp: , Rfl:     FreeStyle glucose monitoring kit, Test BID, Disp: 1 each, Rfl: 0    gabapentin (Neurontin) 300 mg capsule, Take 1 capsule (300 mg) by mouth 3 times a day., Disp: 90 capsule, Rfl: 5    glipiZIDE XL (Glucotrol XL) 10 mg 24 hr tablet, Take 1 tablet (10 mg) by mouth once daily. Do not crush, chew, or split., Disp: 30 tablet, Rfl: 11    hydrOXYzine pamoate (Vistaril) 25 mg capsule, Take 1 capsule (25 mg) by mouth 3 times a day as needed for anxiety., Disp: 90 capsule, Rfl: 0    hydrOXYzine pamoate (Vistaril) 25 mg capsule, Take 1 capsule (25 mg) by mouth 3 times a day as needed for anxiety., Disp: 30 capsule, Rfl: 3    lancets misc, Test twice daily, Disp: 100 each, Rfl: 11    loperamide (Imodium) 2 mg capsule, Take by mouth., Disp: , Rfl:     losartan (Cozaar) 50 mg tablet, Take 1 tablet (50 mg) by mouth once daily., Disp: 30 tablet, Rfl: 11    metoprolol tartrate (Lopressor) 100 mg tablet, Take 1 tablet (100 mg) by mouth 2 times a day., Disp: 60 tablet, Rfl: 5    nitroglycerin (Nitrostat) 0.4 mg SL tablet, Place 1 tablet (0.4 mg) under the tongue every 5 minutes if needed for chest pain., Disp: 30 tablet, Rfl: 1    rosuvastatin (Crestor) 5 mg tablet, TAKE ONE TABLET BY MOUTH DAILY, Disp: 90 tablet, Rfl: 1    traMADol (Ultram) 50 mg tablet, Take 1 tablet (50 mg) by mouth every 6 hours if needed for moderate pain (4 - 6)., Disp: 10 tablet, Rfl: 0    traZODone (Desyrel) 50 mg tablet,  TAKE ONE TABLET BY MOUTH EVERY NIGHT AT BEDTIME, Disp: 90 tablet, Rfl: 1     Assessment/Plan   Problem List Items Addressed This Visit    None  Visit Diagnoses       Cervicalgia    -  Primary    Osteoarthritis of both shoulders, unspecified osteoarthritis type            Get cervical x-ray.  PT to work with shoulders and neck.  Use prn pain meds as directed.  Can use heat or ice prn.  May need Biofreeze or Voltaren Gel prn.

## 2024-07-24 ENCOUNTER — NURSING HOME VISIT (OUTPATIENT)
Dept: POST ACUTE CARE | Facility: EXTERNAL LOCATION | Age: 69
End: 2024-07-24
Payer: MEDICARE

## 2024-07-24 DIAGNOSIS — I10 PRIMARY HYPERTENSION: Primary | ICD-10-CM

## 2024-07-24 NOTE — LETTER
Patient: Silva Corrigan  : 1955    Encounter Date: 2024    Pt was seen in the NH.  Pt is in usual state , no complaint  General appearance: Comfortable, no distress  ROS: No SOB  Medications reviewed  Head: Normal  Neck: Soft  Heart: Regular  Lungs: Clear  Abdomen: soft    Plan:   1)clinically doing fine  2) To continue losartan 50 mg daily    Problem List Items Addressed This Visit       Primary hypertension - Primary            Electronically Signed By: Chevy Ramachandran MD   24  4:40 PM

## 2024-08-28 ENCOUNTER — NURSING HOME VISIT (OUTPATIENT)
Dept: POST ACUTE CARE | Facility: EXTERNAL LOCATION | Age: 69
End: 2024-08-28
Payer: MEDICARE

## 2024-08-28 DIAGNOSIS — E11.9 TYPE 2 DIABETES MELLITUS WITHOUT COMPLICATION, WITHOUT LONG-TERM CURRENT USE OF INSULIN (MULTI): Primary | ICD-10-CM

## 2024-08-28 PROCEDURE — 99308 SBSQ NF CARE LOW MDM 20: CPT | Performed by: INTERNAL MEDICINE

## 2024-08-28 NOTE — LETTER
Patient: Silva Corrigan  : 1955    Encounter Date: 2024    Pt was seen in the NH.  Pt is in usual state , no complaint  General appearance: Comfortable, no distress  ROS: No SOB  Medications reviewed  Head: Normal  Neck: Soft  Heart: Regular  Lungs: Clear  Abdomen: soft    Plan:   1)clinically doing fine  2) To continue jardiance 10 mg daily    Problem List Items Addressed This Visit       Diabetes mellitus, type 2 (Multi) - Primary          Electronically Signed By: Chevy Ramachandran MD   24  8:01 PM

## 2024-08-29 NOTE — PROGRESS NOTES
Pt was seen in the NH.  Pt is in usual state , no complaint  General appearance: Comfortable, no distress  ROS: No SOB  Medications reviewed  Head: Normal  Neck: Soft  Heart: Regular  Lungs: Clear  Abdomen: soft    Plan:   1)clinically doing fine  2) To continue jardiance 10 mg daily    Problem List Items Addressed This Visit       Diabetes mellitus, type 2 (Multi) - Primary

## 2024-09-14 ENCOUNTER — LAB REQUISITION (OUTPATIENT)
Dept: LAB | Facility: HOSPITAL | Age: 69
End: 2024-09-14
Payer: MEDICARE

## 2024-09-14 DIAGNOSIS — U07.1 COVID-19: ICD-10-CM

## 2024-09-14 LAB
BASOPHILS # BLD AUTO: 0.02 X10*3/UL (ref 0–0.1)
BASOPHILS NFR BLD AUTO: 0.3 %
EOSINOPHIL # BLD AUTO: 0 X10*3/UL (ref 0–0.7)
EOSINOPHIL NFR BLD AUTO: 0 %
ERYTHROCYTE [DISTWIDTH] IN BLOOD BY AUTOMATED COUNT: 15 % (ref 11.5–14.5)
HCT VFR BLD AUTO: 42.4 % (ref 36–46)
HGB BLD-MCNC: 13.5 G/DL (ref 12–16)
IMM GRANULOCYTES # BLD AUTO: 0.02 X10*3/UL (ref 0–0.7)
IMM GRANULOCYTES NFR BLD AUTO: 0.3 % (ref 0–0.9)
LYMPHOCYTES # BLD AUTO: 0.53 X10*3/UL (ref 1.2–4.8)
LYMPHOCYTES NFR BLD AUTO: 6.9 %
MCH RBC QN AUTO: 28.6 PG (ref 26–34)
MCHC RBC AUTO-ENTMCNC: 31.8 G/DL (ref 32–36)
MCV RBC AUTO: 90 FL (ref 80–100)
MONOCYTES # BLD AUTO: 0.68 X10*3/UL (ref 0.1–1)
MONOCYTES NFR BLD AUTO: 8.9 %
NEUTROPHILS # BLD AUTO: 6.42 X10*3/UL (ref 1.2–7.7)
NEUTROPHILS NFR BLD AUTO: 83.6 %
NRBC BLD-RTO: 0 /100 WBCS (ref 0–0)
PLATELET # BLD AUTO: 230 X10*3/UL (ref 150–450)
RBC # BLD AUTO: 4.72 X10*6/UL (ref 4–5.2)
WBC # BLD AUTO: 7.7 X10*3/UL (ref 4.4–11.3)

## 2024-09-14 PROCEDURE — 85025 COMPLETE CBC W/AUTO DIFF WBC: CPT | Mod: OUT | Performed by: INTERNAL MEDICINE

## 2024-09-14 PROCEDURE — 80053 COMPREHEN METABOLIC PANEL: CPT | Mod: OUT | Performed by: INTERNAL MEDICINE

## 2024-09-15 LAB
ALBUMIN SERPL BCP-MCNC: 3.9 G/DL (ref 3.4–5)
ALP SERPL-CCNC: 59 U/L (ref 33–136)
ALT SERPL W P-5'-P-CCNC: 16 U/L (ref 7–45)
ANION GAP SERPL CALC-SCNC: 12 MMOL/L (ref 10–20)
AST SERPL W P-5'-P-CCNC: 21 U/L (ref 9–39)
BILIRUB SERPL-MCNC: 0.7 MG/DL (ref 0–1.2)
BUN SERPL-MCNC: 12 MG/DL (ref 6–23)
CALCIUM SERPL-MCNC: 9.4 MG/DL (ref 8.6–10.3)
CHLORIDE SERPL-SCNC: 101 MMOL/L (ref 98–107)
CO2 SERPL-SCNC: 28 MMOL/L (ref 21–32)
CREAT SERPL-MCNC: 0.93 MG/DL (ref 0.5–1.05)
EGFRCR SERPLBLD CKD-EPI 2021: 67 ML/MIN/1.73M*2
GLUCOSE SERPL-MCNC: 66 MG/DL (ref 74–99)
POTASSIUM SERPL-SCNC: 3.7 MMOL/L (ref 3.5–5.3)
PROT SERPL-MCNC: 6.5 G/DL (ref 6.4–8.2)
SODIUM SERPL-SCNC: 137 MMOL/L (ref 136–145)

## 2024-09-16 ENCOUNTER — NURSING HOME VISIT (OUTPATIENT)
Dept: POST ACUTE CARE | Facility: EXTERNAL LOCATION | Age: 69
End: 2024-09-16
Payer: MEDICARE

## 2024-09-16 DIAGNOSIS — U07.1 COVID-19: Primary | ICD-10-CM

## 2024-09-16 PROCEDURE — 99309 SBSQ NF CARE MODERATE MDM 30: CPT | Performed by: NURSE PRACTITIONER

## 2024-09-16 NOTE — LETTER
Patient: Silva Corrigan  : 1955    Encounter Date: 2024    Subjective  Patient ID: Silva Corrigan is a 69 y.o. female who presents for No chief complaint on file..  70 yo female resident at [facility] with covid positive.  Staff reports residents pulse ox has been running around 88% and 02 has been applied.          Review of Systems   Constitutional:  Positive for chills. Negative for fever.   HENT:  Positive for congestion. Negative for sore throat.    Respiratory:  Positive for cough and wheezing. Negative for shortness of breath.    Cardiovascular:  Negative for chest pain.   Gastrointestinal:  Negative for nausea and vomiting.   Neurological:  Negative for dizziness and headaches.       Objective  Physical Exam  Constitutional:       General: She is not in acute distress.     Appearance: She is not ill-appearing.   HENT:      Nose: Congestion present.   Cardiovascular:      Rate and Rhythm: Normal rate and regular rhythm.   Pulmonary:      Breath sounds: Wheezing present.      Comments: Wheezing heard kelle posterior lobes on expiration.  Neurological:      Mental Status: She is alert.           Current Outpatient Medications:   •  acetaminophen (Tylenol) 500 mg tablet, Take 1 tablet (500 mg) by mouth every 6 hours if needed., Disp: , Rfl:   •  aspirin 81 mg chewable tablet, Chew 1 tablet (81 mg)., Disp: , Rfl:   •  atorvastatin (Lipitor) 20 mg tablet, Take 1 tablet (20 mg) by mouth once daily at bedtime., Disp: , Rfl:   •  blood sugar diagnostic (Blood Glucose Test) strip, 100 strips 2 times a day., Disp: 100 strip, Rfl: 11  •  BLOOD SUGAR DIAGNOSTIC MISC, by in vitro route., Disp: , Rfl:   •  blood sugar diagnostic strip, , Disp: , Rfl:   •  buPROPion XL (Wellbutrin XL) 300 mg 24 hr tablet, TAKE 1 TABLET BY MOUTH DAILY, Disp: 90 tablet, Rfl: 1  •  calcium carbonate-vitamin D3 600 mg-10 mcg (400 unit) chewable tablet, Chew 1 tablet once daily., Disp: , Rfl:   •  cyanocobalamin (Vitamin B-12) 1,000 mcg tablet,  TAKE ONE TABLET BY MOUTH DAILY, Disp: 90 tablet, Rfl: 1  •  desvenlafaxine 100 mg 24 hr tablet, Take 1 tablet (100 mg) by mouth once daily., Disp: 90 tablet, Rfl: 1  •  esomeprazole (NexIUM) 40 mg DR capsule, Take 1 capsule (40 mg) by mouth once daily in the morning. Take before meals., Disp: 90 capsule, Rfl: 1  •  fluticasone (Flonase) 50 mcg/actuation nasal spray, Administer 2 sprays into each nostril once daily., Disp: , Rfl:   •  FreeStyle glucose monitoring kit, Test BID, Disp: 1 each, Rfl: 0  •  gabapentin (Neurontin) 300 mg capsule, Take 1 capsule (300 mg) by mouth 3 times a day., Disp: 90 capsule, Rfl: 5  •  glipiZIDE XL (Glucotrol XL) 10 mg 24 hr tablet, Take 1 tablet (10 mg) by mouth once daily. Do not crush, chew, or split., Disp: 30 tablet, Rfl: 11  •  hydrOXYzine pamoate (Vistaril) 25 mg capsule, Take 1 capsule (25 mg) by mouth 3 times a day as needed for anxiety., Disp: 90 capsule, Rfl: 0  •  hydrOXYzine pamoate (Vistaril) 25 mg capsule, Take 1 capsule (25 mg) by mouth 3 times a day as needed for anxiety., Disp: 30 capsule, Rfl: 3  •  lancets misc, Test twice daily, Disp: 100 each, Rfl: 11  •  loperamide (Imodium) 2 mg capsule, Take by mouth., Disp: , Rfl:   •  losartan (Cozaar) 50 mg tablet, Take 1 tablet (50 mg) by mouth once daily., Disp: 30 tablet, Rfl: 11  •  metoprolol tartrate (Lopressor) 100 mg tablet, Take 1 tablet (100 mg) by mouth 2 times a day., Disp: 60 tablet, Rfl: 5  •  nitroglycerin (Nitrostat) 0.4 mg SL tablet, Place 1 tablet (0.4 mg) under the tongue every 5 minutes if needed for chest pain., Disp: 30 tablet, Rfl: 1  •  rosuvastatin (Crestor) 5 mg tablet, TAKE ONE TABLET BY MOUTH DAILY, Disp: 90 tablet, Rfl: 1  •  traMADol (Ultram) 50 mg tablet, Take 1 tablet (50 mg) by mouth every 6 hours if needed for moderate pain (4 - 6)., Disp: 10 tablet, Rfl: 0  •  traZODone (Desyrel) 50 mg tablet, TAKE ONE TABLET BY MOUTH EVERY NIGHT AT BEDTIME, Disp: 90 tablet, Rfl: 1      Assessment/Plan  Problem List Items Addressed This Visit    None  Visit Diagnoses       COVID-19    -  Primary        Will start covid protocols.  Start on Duoneb tx q6hrs x 7 days.  Keep 02 on 2L to keep oxygen above 90%.  Continue to monitor.             Electronically Signed By: ROWENA Barahona   9/19/24  7:17 AM

## 2024-09-19 ASSESSMENT — ENCOUNTER SYMPTOMS
CHILLS: 1
COUGH: 1
NAUSEA: 0
VOMITING: 0
SORE THROAT: 0
FEVER: 0
SHORTNESS OF BREATH: 0
WHEEZING: 1
HEADACHES: 0
DIZZINESS: 0

## 2024-09-19 NOTE — PROGRESS NOTES
Subjective   Patient ID: Silva Corrigan is a 69 y.o. female who presents for No chief complaint on file..  70 yo female resident at [facility] with covid positive.  Staff reports residents pulse ox has been running around 88% and 02 has been applied.          Review of Systems   Constitutional:  Positive for chills. Negative for fever.   HENT:  Positive for congestion. Negative for sore throat.    Respiratory:  Positive for cough and wheezing. Negative for shortness of breath.    Cardiovascular:  Negative for chest pain.   Gastrointestinal:  Negative for nausea and vomiting.   Neurological:  Negative for dizziness and headaches.       Objective   Physical Exam  Constitutional:       General: She is not in acute distress.     Appearance: She is not ill-appearing.   HENT:      Nose: Congestion present.   Cardiovascular:      Rate and Rhythm: Normal rate and regular rhythm.   Pulmonary:      Breath sounds: Wheezing present.      Comments: Wheezing heard kelle posterior lobes on expiration.  Neurological:      Mental Status: She is alert.           Current Outpatient Medications:     acetaminophen (Tylenol) 500 mg tablet, Take 1 tablet (500 mg) by mouth every 6 hours if needed., Disp: , Rfl:     aspirin 81 mg chewable tablet, Chew 1 tablet (81 mg)., Disp: , Rfl:     atorvastatin (Lipitor) 20 mg tablet, Take 1 tablet (20 mg) by mouth once daily at bedtime., Disp: , Rfl:     blood sugar diagnostic (Blood Glucose Test) strip, 100 strips 2 times a day., Disp: 100 strip, Rfl: 11    BLOOD SUGAR DIAGNOSTIC MISC, by in vitro route., Disp: , Rfl:     blood sugar diagnostic strip, , Disp: , Rfl:     buPROPion XL (Wellbutrin XL) 300 mg 24 hr tablet, TAKE 1 TABLET BY MOUTH DAILY, Disp: 90 tablet, Rfl: 1    calcium carbonate-vitamin D3 600 mg-10 mcg (400 unit) chewable tablet, Chew 1 tablet once daily., Disp: , Rfl:     cyanocobalamin (Vitamin B-12) 1,000 mcg tablet, TAKE ONE TABLET BY MOUTH DAILY, Disp: 90 tablet, Rfl: 1    desvenlafaxine 100  mg 24 hr tablet, Take 1 tablet (100 mg) by mouth once daily., Disp: 90 tablet, Rfl: 1    esomeprazole (NexIUM) 40 mg DR capsule, Take 1 capsule (40 mg) by mouth once daily in the morning. Take before meals., Disp: 90 capsule, Rfl: 1    fluticasone (Flonase) 50 mcg/actuation nasal spray, Administer 2 sprays into each nostril once daily., Disp: , Rfl:     FreeStyle glucose monitoring kit, Test BID, Disp: 1 each, Rfl: 0    gabapentin (Neurontin) 300 mg capsule, Take 1 capsule (300 mg) by mouth 3 times a day., Disp: 90 capsule, Rfl: 5    glipiZIDE XL (Glucotrol XL) 10 mg 24 hr tablet, Take 1 tablet (10 mg) by mouth once daily. Do not crush, chew, or split., Disp: 30 tablet, Rfl: 11    hydrOXYzine pamoate (Vistaril) 25 mg capsule, Take 1 capsule (25 mg) by mouth 3 times a day as needed for anxiety., Disp: 90 capsule, Rfl: 0    hydrOXYzine pamoate (Vistaril) 25 mg capsule, Take 1 capsule (25 mg) by mouth 3 times a day as needed for anxiety., Disp: 30 capsule, Rfl: 3    lancets misc, Test twice daily, Disp: 100 each, Rfl: 11    loperamide (Imodium) 2 mg capsule, Take by mouth., Disp: , Rfl:     losartan (Cozaar) 50 mg tablet, Take 1 tablet (50 mg) by mouth once daily., Disp: 30 tablet, Rfl: 11    metoprolol tartrate (Lopressor) 100 mg tablet, Take 1 tablet (100 mg) by mouth 2 times a day., Disp: 60 tablet, Rfl: 5    nitroglycerin (Nitrostat) 0.4 mg SL tablet, Place 1 tablet (0.4 mg) under the tongue every 5 minutes if needed for chest pain., Disp: 30 tablet, Rfl: 1    rosuvastatin (Crestor) 5 mg tablet, TAKE ONE TABLET BY MOUTH DAILY, Disp: 90 tablet, Rfl: 1    traMADol (Ultram) 50 mg tablet, Take 1 tablet (50 mg) by mouth every 6 hours if needed for moderate pain (4 - 6)., Disp: 10 tablet, Rfl: 0    traZODone (Desyrel) 50 mg tablet, TAKE ONE TABLET BY MOUTH EVERY NIGHT AT BEDTIME, Disp: 90 tablet, Rfl: 1     Assessment/Plan   Problem List Items Addressed This Visit    None  Visit Diagnoses       COVID-19    -  Primary         Will start covid protocols.  Start on Duoneb tx q6hrs x 7 days.  Keep 02 on 2L to keep oxygen above 90%.  Continue to monitor.

## 2024-09-23 ENCOUNTER — NURSING HOME VISIT (OUTPATIENT)
Dept: POST ACUTE CARE | Facility: EXTERNAL LOCATION | Age: 69
End: 2024-09-23
Payer: MEDICARE

## 2024-09-23 DIAGNOSIS — U07.1 COVID-19: Primary | ICD-10-CM

## 2024-09-23 PROCEDURE — 99308 SBSQ NF CARE LOW MDM 20: CPT | Performed by: INTERNAL MEDICINE

## 2024-09-23 NOTE — LETTER
Patient: Silva Corrigan  : 1955    Encounter Date: 2024    Pt was seen in the NH.  Pt is diagnosed with covid, still has body ache  General appearance: Comfortable, no distress  ROS: No SOB  Medications reviewed  Head: Normal  Neck: Soft  Heart: Regular  Lungs: Clear  Abdomen: soft    Plan:   1)Mild case  2) To continue supportive care with increase fluid and tylenol    Problem List Items Addressed This Visit    None  Visit Diagnoses       COVID-19    -  Primary               Electronically Signed By: Chevy Ramachandran MD   24  8:55 PM

## 2024-09-24 NOTE — PROGRESS NOTES
Pt was seen in the NH.  Pt is diagnosed with covid, still has body ache  General appearance: Comfortable, no distress  ROS: No SOB  Medications reviewed  Head: Normal  Neck: Soft  Heart: Regular  Lungs: Clear  Abdomen: soft    Plan:   1)Mild case  2) To continue supportive care with increase fluid and tylenol    Problem List Items Addressed This Visit    None  Visit Diagnoses       COVID-19    -  Primary

## 2024-10-16 ENCOUNTER — NURSING HOME VISIT (OUTPATIENT)
Dept: POST ACUTE CARE | Facility: EXTERNAL LOCATION | Age: 69
End: 2024-10-16
Payer: MEDICARE

## 2024-10-16 DIAGNOSIS — J30.2 SEASONAL ALLERGIES: Primary | ICD-10-CM

## 2024-10-16 DIAGNOSIS — K21.9 GASTROESOPHAGEAL REFLUX DISEASE WITHOUT ESOPHAGITIS: ICD-10-CM

## 2024-10-16 PROCEDURE — 99308 SBSQ NF CARE LOW MDM 20: CPT | Performed by: NURSE PRACTITIONER

## 2024-10-16 NOTE — LETTER
Patient: Silva Corrigan  : 1955    Encounter Date: 10/16/2024    Subjective  Patient ID: Silva Corrigan is a 69 y.o. female who presents for No chief complaint on file..  68 yo female resident at [facility] with history of dementia requesting Nexium for history of GERD.  States she used to take Nexium daily for several years.  Denies having any current pain, heartburn, or n/v.  She is also requesting zyrtec for seasonal allergies and this time of year she has congestion in her nose.          Review of Systems   Constitutional:  Negative for chills, fatigue and fever.   HENT:  Positive for rhinorrhea. Negative for congestion, sinus pressure, sinus pain and sore throat.    Eyes:  Negative for itching.   Respiratory: Negative.     Cardiovascular: Negative.    Gastrointestinal: Negative.    Psychiatric/Behavioral:  Positive for confusion.        Objective  Physical Exam  Constitutional:       General: She is not in acute distress.  HENT:      Right Ear: External ear normal.      Left Ear: External ear normal.      Nose: No congestion or rhinorrhea.      Mouth/Throat:      Mouth: Mucous membranes are moist.   Cardiovascular:      Rate and Rhythm: Normal rate and regular rhythm.   Pulmonary:      Effort: Pulmonary effort is normal.      Breath sounds: Normal breath sounds.   Abdominal:      General: Bowel sounds are normal. There is no distension.      Tenderness: There is no abdominal tenderness. There is no guarding.   Skin:     General: Skin is warm and dry.   Neurological:      Mental Status: She is alert.           Current Outpatient Medications:   •  acetaminophen (Tylenol) 500 mg tablet, Take 1 tablet (500 mg) by mouth every 6 hours if needed., Disp: , Rfl:   •  aspirin 81 mg chewable tablet, Chew 1 tablet (81 mg)., Disp: , Rfl:   •  atorvastatin (Lipitor) 20 mg tablet, Take 1 tablet (20 mg) by mouth once daily at bedtime., Disp: , Rfl:   •  blood sugar diagnostic (Blood Glucose Test) strip, 100 strips 2 times a day.,  Disp: 100 strip, Rfl: 11  •  BLOOD SUGAR DIAGNOSTIC MISC, by in vitro route., Disp: , Rfl:   •  blood sugar diagnostic strip, , Disp: , Rfl:   •  buPROPion XL (Wellbutrin XL) 300 mg 24 hr tablet, TAKE 1 TABLET BY MOUTH DAILY, Disp: 90 tablet, Rfl: 1  •  calcium carbonate-vitamin D3 600 mg-10 mcg (400 unit) chewable tablet, Chew 1 tablet once daily., Disp: , Rfl:   •  cyanocobalamin (Vitamin B-12) 1,000 mcg tablet, TAKE ONE TABLET BY MOUTH DAILY, Disp: 90 tablet, Rfl: 1  •  desvenlafaxine 100 mg 24 hr tablet, Take 1 tablet (100 mg) by mouth once daily., Disp: 90 tablet, Rfl: 1  •  esomeprazole (NexIUM) 40 mg DR capsule, Take 1 capsule (40 mg) by mouth once daily in the morning. Take before meals., Disp: 90 capsule, Rfl: 1  •  fluticasone (Flonase) 50 mcg/actuation nasal spray, Administer 2 sprays into each nostril once daily., Disp: , Rfl:   •  FreeStyle glucose monitoring kit, Test BID, Disp: 1 each, Rfl: 0  •  gabapentin (Neurontin) 300 mg capsule, Take 1 capsule (300 mg) by mouth 3 times a day., Disp: 90 capsule, Rfl: 5  •  glipiZIDE XL (Glucotrol XL) 10 mg 24 hr tablet, Take 1 tablet (10 mg) by mouth once daily. Do not crush, chew, or split., Disp: 30 tablet, Rfl: 11  •  hydrOXYzine pamoate (Vistaril) 25 mg capsule, Take 1 capsule (25 mg) by mouth 3 times a day as needed for anxiety., Disp: 90 capsule, Rfl: 0  •  hydrOXYzine pamoate (Vistaril) 25 mg capsule, Take 1 capsule (25 mg) by mouth 3 times a day as needed for anxiety., Disp: 30 capsule, Rfl: 3  •  lancets misc, Test twice daily, Disp: 100 each, Rfl: 11  •  loperamide (Imodium) 2 mg capsule, Take by mouth., Disp: , Rfl:   •  losartan (Cozaar) 50 mg tablet, Take 1 tablet (50 mg) by mouth once daily., Disp: 30 tablet, Rfl: 11  •  metoprolol tartrate (Lopressor) 100 mg tablet, Take 1 tablet (100 mg) by mouth 2 times a day., Disp: 60 tablet, Rfl: 5  •  nitroglycerin (Nitrostat) 0.4 mg SL tablet, Place 1 tablet (0.4 mg) under the tongue every 5 minutes if  needed for chest pain., Disp: 30 tablet, Rfl: 1  •  rosuvastatin (Crestor) 5 mg tablet, TAKE ONE TABLET BY MOUTH DAILY, Disp: 90 tablet, Rfl: 1  •  traMADol (Ultram) 50 mg tablet, Take 1 tablet (50 mg) by mouth every 6 hours if needed for moderate pain (4 - 6)., Disp: 10 tablet, Rfl: 0  •  traZODone (Desyrel) 50 mg tablet, TAKE ONE TABLET BY MOUTH EVERY NIGHT AT BEDTIME, Disp: 90 tablet, Rfl: 1     Assessment/Plan  Problem List Items Addressed This Visit       GERD (gastroesophageal reflux disease)    Seasonal allergies - Primary   She does have a previous history of GERD.  Will have her take Nexium 20mg daily prn symptoms.    Start Zyrtec 10mg daily for seasonal allergy symptoms.  Continue to monitor.            Electronically Signed By: ROWENA Barahona   10/20/24  5:58 PM

## 2024-10-20 ASSESSMENT — ENCOUNTER SYMPTOMS
CHILLS: 0
RHINORRHEA: 1
SINUS PRESSURE: 0
CONFUSION: 1
FATIGUE: 0
EYE ITCHING: 0
SINUS PAIN: 0
GASTROINTESTINAL NEGATIVE: 1
SORE THROAT: 0
CARDIOVASCULAR NEGATIVE: 1
RESPIRATORY NEGATIVE: 1
FEVER: 0

## 2024-10-20 NOTE — PROGRESS NOTES
Subjective   Patient ID: Silva Corrigan is a 69 y.o. female who presents for No chief complaint on file..  68 yo female resident at [facility] with history of dementia requesting Nexium for history of GERD.  States she used to take Nexium daily for several years.  Denies having any current pain, heartburn, or n/v.  She is also requesting zyrtec for seasonal allergies and this time of year she has congestion in her nose.          Review of Systems   Constitutional:  Negative for chills, fatigue and fever.   HENT:  Positive for rhinorrhea. Negative for congestion, sinus pressure, sinus pain and sore throat.    Eyes:  Negative for itching.   Respiratory: Negative.     Cardiovascular: Negative.    Gastrointestinal: Negative.    Psychiatric/Behavioral:  Positive for confusion.        Objective   Physical Exam  Constitutional:       General: She is not in acute distress.  HENT:      Right Ear: External ear normal.      Left Ear: External ear normal.      Nose: No congestion or rhinorrhea.      Mouth/Throat:      Mouth: Mucous membranes are moist.   Cardiovascular:      Rate and Rhythm: Normal rate and regular rhythm.   Pulmonary:      Effort: Pulmonary effort is normal.      Breath sounds: Normal breath sounds.   Abdominal:      General: Bowel sounds are normal. There is no distension.      Tenderness: There is no abdominal tenderness. There is no guarding.   Skin:     General: Skin is warm and dry.   Neurological:      Mental Status: She is alert.           Current Outpatient Medications:     acetaminophen (Tylenol) 500 mg tablet, Take 1 tablet (500 mg) by mouth every 6 hours if needed., Disp: , Rfl:     aspirin 81 mg chewable tablet, Chew 1 tablet (81 mg)., Disp: , Rfl:     atorvastatin (Lipitor) 20 mg tablet, Take 1 tablet (20 mg) by mouth once daily at bedtime., Disp: , Rfl:     blood sugar diagnostic (Blood Glucose Test) strip, 100 strips 2 times a day., Disp: 100 strip, Rfl: 11    BLOOD SUGAR DIAGNOSTIC MISC, by in vitro  route., Disp: , Rfl:     blood sugar diagnostic strip, , Disp: , Rfl:     buPROPion XL (Wellbutrin XL) 300 mg 24 hr tablet, TAKE 1 TABLET BY MOUTH DAILY, Disp: 90 tablet, Rfl: 1    calcium carbonate-vitamin D3 600 mg-10 mcg (400 unit) chewable tablet, Chew 1 tablet once daily., Disp: , Rfl:     cyanocobalamin (Vitamin B-12) 1,000 mcg tablet, TAKE ONE TABLET BY MOUTH DAILY, Disp: 90 tablet, Rfl: 1    desvenlafaxine 100 mg 24 hr tablet, Take 1 tablet (100 mg) by mouth once daily., Disp: 90 tablet, Rfl: 1    esomeprazole (NexIUM) 40 mg DR capsule, Take 1 capsule (40 mg) by mouth once daily in the morning. Take before meals., Disp: 90 capsule, Rfl: 1    fluticasone (Flonase) 50 mcg/actuation nasal spray, Administer 2 sprays into each nostril once daily., Disp: , Rfl:     FreeStyle glucose monitoring kit, Test BID, Disp: 1 each, Rfl: 0    gabapentin (Neurontin) 300 mg capsule, Take 1 capsule (300 mg) by mouth 3 times a day., Disp: 90 capsule, Rfl: 5    glipiZIDE XL (Glucotrol XL) 10 mg 24 hr tablet, Take 1 tablet (10 mg) by mouth once daily. Do not crush, chew, or split., Disp: 30 tablet, Rfl: 11    hydrOXYzine pamoate (Vistaril) 25 mg capsule, Take 1 capsule (25 mg) by mouth 3 times a day as needed for anxiety., Disp: 90 capsule, Rfl: 0    hydrOXYzine pamoate (Vistaril) 25 mg capsule, Take 1 capsule (25 mg) by mouth 3 times a day as needed for anxiety., Disp: 30 capsule, Rfl: 3    lancets misc, Test twice daily, Disp: 100 each, Rfl: 11    loperamide (Imodium) 2 mg capsule, Take by mouth., Disp: , Rfl:     losartan (Cozaar) 50 mg tablet, Take 1 tablet (50 mg) by mouth once daily., Disp: 30 tablet, Rfl: 11    metoprolol tartrate (Lopressor) 100 mg tablet, Take 1 tablet (100 mg) by mouth 2 times a day., Disp: 60 tablet, Rfl: 5    nitroglycerin (Nitrostat) 0.4 mg SL tablet, Place 1 tablet (0.4 mg) under the tongue every 5 minutes if needed for chest pain., Disp: 30 tablet, Rfl: 1    rosuvastatin (Crestor) 5 mg tablet, TAKE  ONE TABLET BY MOUTH DAILY, Disp: 90 tablet, Rfl: 1    traMADol (Ultram) 50 mg tablet, Take 1 tablet (50 mg) by mouth every 6 hours if needed for moderate pain (4 - 6)., Disp: 10 tablet, Rfl: 0    traZODone (Desyrel) 50 mg tablet, TAKE ONE TABLET BY MOUTH EVERY NIGHT AT BEDTIME, Disp: 90 tablet, Rfl: 1     Assessment/Plan   Problem List Items Addressed This Visit       GERD (gastroesophageal reflux disease)    Seasonal allergies - Primary   She does have a previous history of GERD.  Will have her take Nexium 20mg daily prn symptoms.    Start Zyrtec 10mg daily for seasonal allergy symptoms.  Continue to monitor.

## 2024-10-24 ENCOUNTER — NURSING HOME VISIT (OUTPATIENT)
Dept: POST ACUTE CARE | Facility: EXTERNAL LOCATION | Age: 69
End: 2024-10-24
Payer: MEDICARE

## 2024-10-24 DIAGNOSIS — M79.671 RIGHT FOOT PAIN: Primary | ICD-10-CM

## 2024-10-24 PROCEDURE — 99308 SBSQ NF CARE LOW MDM 20: CPT | Performed by: INTERNAL MEDICINE

## 2024-10-24 NOTE — LETTER
Patient: Silva Corrigan  : 1955    Encounter Date: 10/24/2024    Pt was seen in the NH.  Pt co right foot pain after a fall  General appearance: Comfortable, no distress  ROS: No SOB  Medications reviewed  Head: Normal  Neck: Soft  Heart: Regular  Lungs: Clear  Abdomen: soft  Ext tenderness right 5th metatarsal    Plan:   1)clinically doing fine  2) To continue right foot xr    Problem List Items Addressed This Visit    None  Visit Diagnoses       Right foot pain    -  Primary               Electronically Signed By: Chevy Ramachandran MD   10/24/24  5:03 PM

## 2024-10-24 NOTE — PROGRESS NOTES
Pt was seen in the NH.  Pt co right foot pain after a fall  General appearance: Comfortable, no distress  ROS: No SOB  Medications reviewed  Head: Normal  Neck: Soft  Heart: Regular  Lungs: Clear  Abdomen: soft  Ext tenderness right 5th metatarsal    Plan:   1)clinically doing fine  2) To continue right foot xr    Problem List Items Addressed This Visit    None  Visit Diagnoses       Right foot pain    -  Primary

## 2024-11-20 ENCOUNTER — NURSING HOME VISIT (OUTPATIENT)
Dept: POST ACUTE CARE | Facility: EXTERNAL LOCATION | Age: 69
End: 2024-11-20
Payer: MEDICARE

## 2024-11-20 DIAGNOSIS — I10 PRIMARY HYPERTENSION: Primary | ICD-10-CM

## 2024-11-20 PROCEDURE — 99308 SBSQ NF CARE LOW MDM 20: CPT | Performed by: INTERNAL MEDICINE

## 2024-11-20 NOTE — LETTER
Patient: Silva Corrigan  : 1955    Encounter Date: 2024    Pt was seen in the NH.  Pt is in usual state , no complaint  General appearance: Comfortable, no distress  ROS: No SOB  Medications reviewed  Head: Normal  Neck: Soft  Heart: Regular  Lungs: Clear  Abdomen: soft    Plan:   1)clinically doing fine  2) To continue losartan 50 mg daily    Problem List Items Addressed This Visit       Primary hypertension - Primary          Electronically Signed By: Chevy Ramachandran MD   24  7:26 PM

## 2024-12-02 ENCOUNTER — NURSING HOME VISIT (OUTPATIENT)
Dept: POST ACUTE CARE | Facility: EXTERNAL LOCATION | Age: 69
End: 2024-12-02
Payer: MEDICARE

## 2024-12-02 DIAGNOSIS — E11.9 TYPE 2 DIABETES MELLITUS WITHOUT COMPLICATION, WITHOUT LONG-TERM CURRENT USE OF INSULIN (MULTI): Primary | ICD-10-CM

## 2024-12-02 NOTE — LETTER
Patient: Silva Corrigan  : 1955    Encounter Date: 2024    Subjective  Patient ID: Silva Corrigan is a 69 y.o. female who presents for No chief complaint on file..  68 yo female resident at [facility] with history of htn, DMII, dementia.  Resident requesting to see NP to discuss new medications for DM.          Review of Systems   Constitutional:  Positive for appetite change. Negative for fatigue, fever and unexpected weight change.   Respiratory: Negative.     Cardiovascular: Negative.    Gastrointestinal: Negative.    Endocrine: Negative.    Psychiatric/Behavioral:  Positive for confusion.        Objective  Physical Exam  Constitutional:       General: She is not in acute distress.  Cardiovascular:      Rate and Rhythm: Normal rate and regular rhythm.   Pulmonary:      Effort: Pulmonary effort is normal.      Breath sounds: Normal breath sounds.   Neurological:      Mental Status: She is alert.       There were no vitals taken for this visit.      Current Outpatient Medications:   •  acetaminophen (Tylenol) 500 mg tablet, Take 1 tablet (500 mg) by mouth every 6 hours if needed., Disp: , Rfl:   •  aspirin 81 mg chewable tablet, Chew 1 tablet (81 mg)., Disp: , Rfl:   •  atorvastatin (Lipitor) 20 mg tablet, Take 1 tablet (20 mg) by mouth once daily at bedtime., Disp: , Rfl:   •  blood sugar diagnostic (Blood Glucose Test) strip, 100 strips 2 times a day., Disp: 100 strip, Rfl: 11  •  BLOOD SUGAR DIAGNOSTIC MISC, by in vitro route., Disp: , Rfl:   •  blood sugar diagnostic strip, , Disp: , Rfl:   •  buPROPion XL (Wellbutrin XL) 300 mg 24 hr tablet, TAKE 1 TABLET BY MOUTH DAILY, Disp: 90 tablet, Rfl: 1  •  calcium carbonate-vitamin D3 600 mg-10 mcg (400 unit) chewable tablet, Chew 1 tablet once daily., Disp: , Rfl:   •  cyanocobalamin (Vitamin B-12) 1,000 mcg tablet, TAKE ONE TABLET BY MOUTH DAILY, Disp: 90 tablet, Rfl: 1  •  desvenlafaxine 100 mg 24 hr tablet, Take 1 tablet (100 mg) by mouth once daily., Disp: 90  tablet, Rfl: 1  •  esomeprazole (NexIUM) 40 mg DR capsule, Take 1 capsule (40 mg) by mouth once daily in the morning. Take before meals., Disp: 90 capsule, Rfl: 1  •  fluticasone (Flonase) 50 mcg/actuation nasal spray, Administer 2 sprays into each nostril once daily., Disp: , Rfl:   •  gabapentin (Neurontin) 300 mg capsule, Take 1 capsule (300 mg) by mouth 3 times a day., Disp: 90 capsule, Rfl: 5  •  glipiZIDE XL (Glucotrol XL) 10 mg 24 hr tablet, Take 1 tablet (10 mg) by mouth once daily. Do not crush, chew, or split., Disp: 30 tablet, Rfl: 11  •  hydrOXYzine pamoate (Vistaril) 25 mg capsule, Take 1 capsule (25 mg) by mouth 3 times a day as needed for anxiety., Disp: 90 capsule, Rfl: 0  •  hydrOXYzine pamoate (Vistaril) 25 mg capsule, Take 1 capsule (25 mg) by mouth 3 times a day as needed for anxiety., Disp: 30 capsule, Rfl: 3  •  lancets misc, Test twice daily, Disp: 100 each, Rfl: 11  •  loperamide (Imodium) 2 mg capsule, Take by mouth., Disp: , Rfl:   •  losartan (Cozaar) 50 mg tablet, Take 1 tablet (50 mg) by mouth once daily., Disp: 30 tablet, Rfl: 11  •  metoprolol tartrate (Lopressor) 100 mg tablet, Take 1 tablet (100 mg) by mouth 2 times a day., Disp: 60 tablet, Rfl: 5  •  nitroglycerin (Nitrostat) 0.4 mg SL tablet, Place 1 tablet (0.4 mg) under the tongue every 5 minutes if needed for chest pain., Disp: 30 tablet, Rfl: 1  •  rosuvastatin (Crestor) 5 mg tablet, TAKE ONE TABLET BY MOUTH DAILY, Disp: 90 tablet, Rfl: 1  •  traMADol (Ultram) 50 mg tablet, Take 1 tablet (50 mg) by mouth every 6 hours if needed for moderate pain (4 - 6)., Disp: 10 tablet, Rfl: 0  •  traZODone (Desyrel) 50 mg tablet, TAKE ONE TABLET BY MOUTH EVERY NIGHT AT BEDTIME, Disp: 90 tablet, Rfl: 1     Assessment/Plan  Problem List Items Addressed This Visit       Diabetes mellitus, type 2 (Multi) - Primary   HgbA1c up to 8.2.  Started resident on Lantus at .  Resident admits she drinks a can of pop daily and eats whatever she wants to  eat including sweets.  Discussed proper diet and nutrition.  Recommend she meet with dietician.  Will increase her Lantus by 3 units this week and continue to monitor.  Get Hgba1c q3mo.  Continue on Atorvastatin.        Electronically Signed By: ROWENA Barahona   12/6/24  4:54 PM

## 2024-12-05 ENCOUNTER — NURSING HOME VISIT (OUTPATIENT)
Dept: POST ACUTE CARE | Facility: EXTERNAL LOCATION | Age: 69
End: 2024-12-05
Payer: MEDICARE

## 2024-12-05 DIAGNOSIS — K12.0 CANKER SORE: Primary | ICD-10-CM

## 2024-12-05 NOTE — LETTER
Patient: Silva Corrigan  : 1955    Encounter Date: 2024    Subjective  Patient ID: Silva Corrigan is a 69 y.o. female who presents for No chief complaint on file..  68 yo female resident of [facility] with history of DM, HTN and dementia.  Staff reports resident was c/o sore tongue yesterday and they have concerns it maybe thrush.  Resident reports she gargled with salt water and she no longer has pain in her tongue.          Review of Systems   Constitutional:  Negative for chills and fever.   HENT:  Negative for congestion, mouth sores, rhinorrhea and sinus pain.    Respiratory: Negative.     Cardiovascular: Negative.        Objective  Physical Exam  Constitutional:       General: She is not in acute distress.     Appearance: She is not ill-appearing.   HENT:      Nose: No rhinorrhea.      Mouth/Throat:      Mouth: Mucous membranes are moist.      Pharynx: Oropharynx is clear. No oropharyngeal exudate or posterior oropharyngeal erythema.      Comments: No sores or lesions noted on tongue.  No discoloration or coating noted on tongue.    Cardiovascular:      Rate and Rhythm: Normal rate and regular rhythm.   Pulmonary:      Effort: Pulmonary effort is normal.      Breath sounds: Normal breath sounds.   Lymphadenopathy:      Cervical: No cervical adenopathy.   Neurological:      Mental Status: She is alert.       There were no vitals taken for this visit.      Current Outpatient Medications:   •  acetaminophen (Tylenol) 500 mg tablet, Take 1 tablet (500 mg) by mouth every 6 hours if needed., Disp: , Rfl:   •  aspirin 81 mg chewable tablet, Chew 1 tablet (81 mg)., Disp: , Rfl:   •  atorvastatin (Lipitor) 20 mg tablet, Take 1 tablet (20 mg) by mouth once daily at bedtime., Disp: , Rfl:   •  blood sugar diagnostic (Blood Glucose Test) strip, 100 strips 2 times a day., Disp: 100 strip, Rfl: 11  •  BLOOD SUGAR DIAGNOSTIC MISC, by in vitro route., Disp: , Rfl:   •  blood sugar diagnostic strip, , Disp: , Rfl:   •   buPROPion XL (Wellbutrin XL) 300 mg 24 hr tablet, TAKE 1 TABLET BY MOUTH DAILY, Disp: 90 tablet, Rfl: 1  •  calcium carbonate-vitamin D3 600 mg-10 mcg (400 unit) chewable tablet, Chew 1 tablet once daily., Disp: , Rfl:   •  cyanocobalamin (Vitamin B-12) 1,000 mcg tablet, TAKE ONE TABLET BY MOUTH DAILY, Disp: 90 tablet, Rfl: 1  •  desvenlafaxine 100 mg 24 hr tablet, Take 1 tablet (100 mg) by mouth once daily., Disp: 90 tablet, Rfl: 1  •  esomeprazole (NexIUM) 40 mg DR capsule, Take 1 capsule (40 mg) by mouth once daily in the morning. Take before meals., Disp: 90 capsule, Rfl: 1  •  fluticasone (Flonase) 50 mcg/actuation nasal spray, Administer 2 sprays into each nostril once daily., Disp: , Rfl:   •  gabapentin (Neurontin) 300 mg capsule, Take 1 capsule (300 mg) by mouth 3 times a day., Disp: 90 capsule, Rfl: 5  •  glipiZIDE XL (Glucotrol XL) 10 mg 24 hr tablet, Take 1 tablet (10 mg) by mouth once daily. Do not crush, chew, or split., Disp: 30 tablet, Rfl: 11  •  hydrOXYzine pamoate (Vistaril) 25 mg capsule, Take 1 capsule (25 mg) by mouth 3 times a day as needed for anxiety., Disp: 90 capsule, Rfl: 0  •  hydrOXYzine pamoate (Vistaril) 25 mg capsule, Take 1 capsule (25 mg) by mouth 3 times a day as needed for anxiety., Disp: 30 capsule, Rfl: 3  •  lancets misc, Test twice daily, Disp: 100 each, Rfl: 11  •  loperamide (Imodium) 2 mg capsule, Take by mouth., Disp: , Rfl:   •  losartan (Cozaar) 50 mg tablet, Take 1 tablet (50 mg) by mouth once daily., Disp: 30 tablet, Rfl: 11  •  metoprolol tartrate (Lopressor) 100 mg tablet, Take 1 tablet (100 mg) by mouth 2 times a day., Disp: 60 tablet, Rfl: 5  •  nitroglycerin (Nitrostat) 0.4 mg SL tablet, Place 1 tablet (0.4 mg) under the tongue every 5 minutes if needed for chest pain., Disp: 30 tablet, Rfl: 1  •  rosuvastatin (Crestor) 5 mg tablet, TAKE ONE TABLET BY MOUTH DAILY, Disp: 90 tablet, Rfl: 1  •  traMADol (Ultram) 50 mg tablet, Take 1 tablet (50 mg) by mouth every 6  hours if needed for moderate pain (4 - 6)., Disp: 10 tablet, Rfl: 0  •  traZODone (Desyrel) 50 mg tablet, TAKE ONE TABLET BY MOUTH EVERY NIGHT AT BEDTIME, Disp: 90 tablet, Rfl: 1     Assessment/Plan  Problem List Items Addressed This Visit       Canker sore - Primary   Resolved.  Cont to monitor.        Electronically Signed By: ROWENA Barahona   12/6/24  4:58 PM

## 2024-12-06 PROBLEM — K12.0 CANKER SORE: Status: ACTIVE | Noted: 2024-12-06

## 2024-12-06 ASSESSMENT — ENCOUNTER SYMPTOMS
CARDIOVASCULAR NEGATIVE: 1
UNEXPECTED WEIGHT CHANGE: 0
CARDIOVASCULAR NEGATIVE: 1
FATIGUE: 0
RESPIRATORY NEGATIVE: 1
RHINORRHEA: 0
APPETITE CHANGE: 1
GASTROINTESTINAL NEGATIVE: 1
ENDOCRINE NEGATIVE: 1
RESPIRATORY NEGATIVE: 1
CONFUSION: 1
FEVER: 0
CHILLS: 0
SINUS PAIN: 0
FEVER: 0

## 2024-12-06 NOTE — PROGRESS NOTES
Subjective   Patient ID: Silva Corrigan is a 69 y.o. female who presents for No chief complaint on file..  68 [facility] female resident of [facility] with history of DM, HTN and dementia.  Staff reports resident was c/o sore tongue yesterday and they have concerns it maybe thrush.  Resident reports she gargled with salt water and she no longer has pain in her tongue.          Review of Systems   Constitutional:  Negative for chills and fever.   HENT:  Negative for congestion, mouth sores, rhinorrhea and sinus pain.    Respiratory: Negative.     Cardiovascular: Negative.        Objective   Physical Exam  Constitutional:       General: She is not in acute distress.     Appearance: She is not ill-appearing.   HENT:      Nose: No rhinorrhea.      Mouth/Throat:      Mouth: Mucous membranes are moist.      Pharynx: Oropharynx is clear. No oropharyngeal exudate or posterior oropharyngeal erythema.      Comments: No sores or lesions noted on tongue.  No discoloration or coating noted on tongue.    Cardiovascular:      Rate and Rhythm: Normal rate and regular rhythm.   Pulmonary:      Effort: Pulmonary effort is normal.      Breath sounds: Normal breath sounds.   Lymphadenopathy:      Cervical: No cervical adenopathy.   Neurological:      Mental Status: She is alert.       There were no vitals taken for this visit.      Current Outpatient Medications:     acetaminophen (Tylenol) 500 mg tablet, Take 1 tablet (500 mg) by mouth every 6 hours if needed., Disp: , Rfl:     aspirin 81 mg chewable tablet, Chew 1 tablet (81 mg)., Disp: , Rfl:     atorvastatin (Lipitor) 20 mg tablet, Take 1 tablet (20 mg) by mouth once daily at bedtime., Disp: , Rfl:     blood sugar diagnostic (Blood Glucose Test) strip, 100 strips 2 times a day., Disp: 100 strip, Rfl: 11    BLOOD SUGAR DIAGNOSTIC MISC, by in vitro route., Disp: , Rfl:     blood sugar diagnostic strip, , Disp: , Rfl:     buPROPion XL (Wellbutrin XL) 300 mg 24 hr tablet, TAKE 1 TABLET BY MOUTH DAILY,  Disp: 90 tablet, Rfl: 1    calcium carbonate-vitamin D3 600 mg-10 mcg (400 unit) chewable tablet, Chew 1 tablet once daily., Disp: , Rfl:     cyanocobalamin (Vitamin B-12) 1,000 mcg tablet, TAKE ONE TABLET BY MOUTH DAILY, Disp: 90 tablet, Rfl: 1    desvenlafaxine 100 mg 24 hr tablet, Take 1 tablet (100 mg) by mouth once daily., Disp: 90 tablet, Rfl: 1    esomeprazole (NexIUM) 40 mg DR capsule, Take 1 capsule (40 mg) by mouth once daily in the morning. Take before meals., Disp: 90 capsule, Rfl: 1    fluticasone (Flonase) 50 mcg/actuation nasal spray, Administer 2 sprays into each nostril once daily., Disp: , Rfl:     gabapentin (Neurontin) 300 mg capsule, Take 1 capsule (300 mg) by mouth 3 times a day., Disp: 90 capsule, Rfl: 5    glipiZIDE XL (Glucotrol XL) 10 mg 24 hr tablet, Take 1 tablet (10 mg) by mouth once daily. Do not crush, chew, or split., Disp: 30 tablet, Rfl: 11    hydrOXYzine pamoate (Vistaril) 25 mg capsule, Take 1 capsule (25 mg) by mouth 3 times a day as needed for anxiety., Disp: 90 capsule, Rfl: 0    hydrOXYzine pamoate (Vistaril) 25 mg capsule, Take 1 capsule (25 mg) by mouth 3 times a day as needed for anxiety., Disp: 30 capsule, Rfl: 3    lancets misc, Test twice daily, Disp: 100 each, Rfl: 11    loperamide (Imodium) 2 mg capsule, Take by mouth., Disp: , Rfl:     losartan (Cozaar) 50 mg tablet, Take 1 tablet (50 mg) by mouth once daily., Disp: 30 tablet, Rfl: 11    metoprolol tartrate (Lopressor) 100 mg tablet, Take 1 tablet (100 mg) by mouth 2 times a day., Disp: 60 tablet, Rfl: 5    nitroglycerin (Nitrostat) 0.4 mg SL tablet, Place 1 tablet (0.4 mg) under the tongue every 5 minutes if needed for chest pain., Disp: 30 tablet, Rfl: 1    rosuvastatin (Crestor) 5 mg tablet, TAKE ONE TABLET BY MOUTH DAILY, Disp: 90 tablet, Rfl: 1    traMADol (Ultram) 50 mg tablet, Take 1 tablet (50 mg) by mouth every 6 hours if needed for moderate pain (4 - 6)., Disp: 10 tablet, Rfl: 0    traZODone (Desyrel) 50 mg  tablet, TAKE ONE TABLET BY MOUTH EVERY NIGHT AT BEDTIME, Disp: 90 tablet, Rfl: 1     Assessment/Plan   Problem List Items Addressed This Visit       Canker sore - Primary   Resolved.  Cont to monitor.

## 2024-12-06 NOTE — PROGRESS NOTES
Subjective   Patient ID: Silva Corrigan is a 69 y.o. female who presents for No chief complaint on file..  68 yo female resident at [facility] with history of htn, DMII, dementia.  Resident requesting to see NP to discuss new medications for DM.          Review of Systems   Constitutional:  Positive for appetite change. Negative for fatigue, fever and unexpected weight change.   Respiratory: Negative.     Cardiovascular: Negative.    Gastrointestinal: Negative.    Endocrine: Negative.    Psychiatric/Behavioral:  Positive for confusion.        Objective   Physical Exam  Constitutional:       General: She is not in acute distress.  Cardiovascular:      Rate and Rhythm: Normal rate and regular rhythm.   Pulmonary:      Effort: Pulmonary effort is normal.      Breath sounds: Normal breath sounds.   Neurological:      Mental Status: She is alert.       There were no vitals taken for this visit.      Current Outpatient Medications:     acetaminophen (Tylenol) 500 mg tablet, Take 1 tablet (500 mg) by mouth every 6 hours if needed., Disp: , Rfl:     aspirin 81 mg chewable tablet, Chew 1 tablet (81 mg)., Disp: , Rfl:     atorvastatin (Lipitor) 20 mg tablet, Take 1 tablet (20 mg) by mouth once daily at bedtime., Disp: , Rfl:     blood sugar diagnostic (Blood Glucose Test) strip, 100 strips 2 times a day., Disp: 100 strip, Rfl: 11    BLOOD SUGAR DIAGNOSTIC MISC, by in vitro route., Disp: , Rfl:     blood sugar diagnostic strip, , Disp: , Rfl:     buPROPion XL (Wellbutrin XL) 300 mg 24 hr tablet, TAKE 1 TABLET BY MOUTH DAILY, Disp: 90 tablet, Rfl: 1    calcium carbonate-vitamin D3 600 mg-10 mcg (400 unit) chewable tablet, Chew 1 tablet once daily., Disp: , Rfl:     cyanocobalamin (Vitamin B-12) 1,000 mcg tablet, TAKE ONE TABLET BY MOUTH DAILY, Disp: 90 tablet, Rfl: 1    desvenlafaxine 100 mg 24 hr tablet, Take 1 tablet (100 mg) by mouth once daily., Disp: 90 tablet, Rfl: 1    esomeprazole (NexIUM) 40 mg DR capsule, Take 1 capsule (40  mg) by mouth once daily in the morning. Take before meals., Disp: 90 capsule, Rfl: 1    fluticasone (Flonase) 50 mcg/actuation nasal spray, Administer 2 sprays into each nostril once daily., Disp: , Rfl:     gabapentin (Neurontin) 300 mg capsule, Take 1 capsule (300 mg) by mouth 3 times a day., Disp: 90 capsule, Rfl: 5    glipiZIDE XL (Glucotrol XL) 10 mg 24 hr tablet, Take 1 tablet (10 mg) by mouth once daily. Do not crush, chew, or split., Disp: 30 tablet, Rfl: 11    hydrOXYzine pamoate (Vistaril) 25 mg capsule, Take 1 capsule (25 mg) by mouth 3 times a day as needed for anxiety., Disp: 90 capsule, Rfl: 0    hydrOXYzine pamoate (Vistaril) 25 mg capsule, Take 1 capsule (25 mg) by mouth 3 times a day as needed for anxiety., Disp: 30 capsule, Rfl: 3    lancets misc, Test twice daily, Disp: 100 each, Rfl: 11    loperamide (Imodium) 2 mg capsule, Take by mouth., Disp: , Rfl:     losartan (Cozaar) 50 mg tablet, Take 1 tablet (50 mg) by mouth once daily., Disp: 30 tablet, Rfl: 11    metoprolol tartrate (Lopressor) 100 mg tablet, Take 1 tablet (100 mg) by mouth 2 times a day., Disp: 60 tablet, Rfl: 5    nitroglycerin (Nitrostat) 0.4 mg SL tablet, Place 1 tablet (0.4 mg) under the tongue every 5 minutes if needed for chest pain., Disp: 30 tablet, Rfl: 1    rosuvastatin (Crestor) 5 mg tablet, TAKE ONE TABLET BY MOUTH DAILY, Disp: 90 tablet, Rfl: 1    traMADol (Ultram) 50 mg tablet, Take 1 tablet (50 mg) by mouth every 6 hours if needed for moderate pain (4 - 6)., Disp: 10 tablet, Rfl: 0    traZODone (Desyrel) 50 mg tablet, TAKE ONE TABLET BY MOUTH EVERY NIGHT AT BEDTIME, Disp: 90 tablet, Rfl: 1     Assessment/Plan   Problem List Items Addressed This Visit       Diabetes mellitus, type 2 (Multi) - Primary   HgbA1c up to 8.2.  Started resident on Lantus at .  Resident admits she drinks a can of pop daily and eats whatever she wants to eat including sweets.  Discussed proper diet and nutrition.  Recommend she meet with  dietician.  Will increase her Lantus by 3 units this week and continue to monitor.  Get Hgba1c q3mo.  Continue on Atorvastatin.

## 2024-12-08 ENCOUNTER — NURSING HOME VISIT (OUTPATIENT)
Dept: POST ACUTE CARE | Facility: EXTERNAL LOCATION | Age: 69
End: 2024-12-08
Payer: MEDICARE

## 2024-12-08 DIAGNOSIS — I10 PRIMARY HYPERTENSION: Primary | ICD-10-CM

## 2024-12-08 PROCEDURE — 99308 SBSQ NF CARE LOW MDM 20: CPT | Performed by: INTERNAL MEDICINE

## 2024-12-08 NOTE — LETTER
Patient: Silva Corrigan  : 1955    Encounter Date: 2024    Pt was seen in the [facility].  Pt is in usual state , no complaint  General appearance: Comfortable, no distress  ROS: No SOB  Medications reviewed  Head: Normal  Neck: Soft  Heart: Regular  Lungs: Clear  Abdomen: soft    Plan:   1)clinically doing fine  2) To continue losartan 50 mg daily    Problem List Items Addressed This Visit       Primary hypertension - Primary          Electronically Signed By: Chevy Ramachandran MD   24  4:48 PM

## 2025-01-18 ENCOUNTER — LAB REQUISITION (OUTPATIENT)
Dept: LAB | Facility: HOSPITAL | Age: 70
End: 2025-01-18
Payer: MEDICARE

## 2025-01-18 DIAGNOSIS — T21.07XA: ICD-10-CM

## 2025-01-18 PROCEDURE — 87185 SC STD ENZYME DETCJ PER NZM: CPT | Mod: OUT,SAMLAB | Performed by: NURSE PRACTITIONER

## 2025-01-23 ENCOUNTER — NURSING HOME VISIT (OUTPATIENT)
Dept: POST ACUTE CARE | Facility: EXTERNAL LOCATION | Age: 70
End: 2025-01-23
Payer: MEDICARE

## 2025-01-23 DIAGNOSIS — K21.9 GASTROESOPHAGEAL REFLUX DISEASE, UNSPECIFIED WHETHER ESOPHAGITIS PRESENT: Primary | ICD-10-CM

## 2025-01-23 LAB
B-LACTAMASE ORGANISM ISLT: NEGATIVE
BACTERIA SPEC CULT: ABNORMAL
GRAM STN SPEC: ABNORMAL
GRAM STN SPEC: ABNORMAL

## 2025-01-23 PROCEDURE — 99308 SBSQ NF CARE LOW MDM 20: CPT | Performed by: INTERNAL MEDICINE

## 2025-01-23 NOTE — LETTER
Patient: Silva Corrigan  : 1955    Encounter Date: 2025    Pt was seen in the NH.  Pt c/o frequent heart burm  General appearance: Comfortable, no distress  ROS: No SOB  Medications reviewed  Head: Normal  Neck: Soft  Heart: Regular  Lungs: Clear  Abdomen: soft    Plan:   1)EGD  2) To increase pantoprazole 40 mg daily    Problem List Items Addressed This Visit       GERD (gastroesophageal reflux disease) - Primary    Relevant Orders    Esophagogastroduodenoscopy (EGD)          Electronically Signed By: Chevy Ramachandran MD   25  4:56 PM

## 2025-01-23 NOTE — PROGRESS NOTES
Pt was seen in the NH.  Pt c/o frequent heart burm  General appearance: Comfortable, no distress  ROS: No SOB  Medications reviewed  Head: Normal  Neck: Soft  Heart: Regular  Lungs: Clear  Abdomen: soft    Plan:   1)EGD  2) To increase pantoprazole 40 mg daily    Problem List Items Addressed This Visit       GERD (gastroesophageal reflux disease) - Primary    Relevant Orders    Esophagogastroduodenoscopy (EGD)

## 2025-01-24 ENCOUNTER — NURSING HOME VISIT (OUTPATIENT)
Dept: POST ACUTE CARE | Facility: EXTERNAL LOCATION | Age: 70
End: 2025-01-24
Payer: MEDICARE

## 2025-01-24 DIAGNOSIS — L03.211 CELLULITIS OF FACE: Primary | ICD-10-CM

## 2025-01-24 PROCEDURE — 99308 SBSQ NF CARE LOW MDM 20: CPT | Performed by: INTERNAL MEDICINE

## 2025-01-24 NOTE — PROGRESS NOTES
Pt was seen in the NH.  Pt c/o pain swelling right side of face  General appearance: Comfortable, no distress  ROS: No SOB  Medications reviewed  Head: Normal  Neck: Soft  Face tenderness induration right side of face  Heart: Regular  Lungs: Clear  Abdomen: soft    Plan:   1) Levaquin 500 mg daily x 10 days  2) Dentist referral    Problem List Items Addressed This Visit    None  Visit Diagnoses       Cellulitis of face    -  Primary

## 2025-01-24 NOTE — LETTER
Patient: Silva Corrigan  : 1955    Encounter Date: 2025    Pt was seen in the NH.  Pt c/o pain swelling right side of face  General appearance: Comfortable, no distress  ROS: No SOB  Medications reviewed  Head: Normal  Neck: Soft  Face tenderness induration right side of face  Heart: Regular  Lungs: Clear  Abdomen: soft    Plan:   1) Levaquin 500 mg daily x 10 days  2) Dentist referral    Problem List Items Addressed This Visit    None  Visit Diagnoses       Cellulitis of face    -  Primary               Electronically Signed By: Chevy Ramachandran MD   25  4:23 PM

## 2025-01-27 ENCOUNTER — NURSING HOME VISIT (OUTPATIENT)
Dept: POST ACUTE CARE | Facility: EXTERNAL LOCATION | Age: 70
End: 2025-01-27
Payer: MEDICARE

## 2025-01-27 DIAGNOSIS — L02.01 ABSCESS OF FACE: ICD-10-CM

## 2025-01-27 DIAGNOSIS — R05.1 ACUTE COUGH: Primary | ICD-10-CM

## 2025-01-27 DIAGNOSIS — R09.81 NASAL CONGESTION: ICD-10-CM

## 2025-01-27 PROCEDURE — 99308 SBSQ NF CARE LOW MDM 20: CPT | Performed by: NURSE PRACTITIONER

## 2025-01-27 NOTE — LETTER
Patient: Silva Corrigan  : 1955    Encounter Date: 2025    Subjective  Patient ID: Silva Corrigan is a 69 y.o. female who presents for No chief complaint on file..  68 yo female resident of [facility] with recent history of cellulitis of the face.  She was put on Levaquin.  Staff states resident has developed a cough and nasal congestion for the past couple of days.          Review of Systems   Constitutional:  Negative for fever.   HENT:  Positive for congestion and rhinorrhea.    Respiratory:  Positive for cough. Negative for shortness of breath and wheezing.    Cardiovascular:  Negative for chest pain and palpitations.   Gastrointestinal:  Negative for abdominal distention, abdominal pain, constipation, diarrhea, nausea and vomiting.       Objective  Physical Exam  Vitals and nursing note reviewed.   Constitutional:       General: She is not in acute distress.  HENT:      Head: Normocephalic.      Nose: Congestion present.   Cardiovascular:      Rate and Rhythm: Normal rate and regular rhythm.      Pulses: Normal pulses.      Heart sounds: Normal heart sounds. No murmur heard.     No friction rub. No gallop.   Pulmonary:      Effort: Pulmonary effort is normal.      Breath sounds: Normal breath sounds. No wheezing, rhonchi or rales.   Abdominal:      General: Bowel sounds are normal.   Musculoskeletal:      Right lower leg: No edema.      Left lower leg: No edema.   Skin:     General: Skin is warm and dry.      Comments: Abscess significantly improved from previously.  Minimal erythema noted.     Neurological:      Mental Status: She is alert.   Psychiatric:         Mood and Affect: Mood normal.       There were no vitals taken for this visit.      Current Outpatient Medications:   •  acetaminophen (Tylenol) 500 mg tablet, Take 1 tablet (500 mg) by mouth every 6 hours if needed., Disp: , Rfl:   •  aspirin 81 mg chewable tablet, Chew 1 tablet (81 mg)., Disp: , Rfl:   •  atorvastatin (Lipitor) 20 mg tablet, Take 1  tablet (20 mg) by mouth once daily at bedtime., Disp: , Rfl:   •  blood sugar diagnostic (Blood Glucose Test) strip, 100 strips 2 times a day., Disp: 100 strip, Rfl: 11  •  BLOOD SUGAR DIAGNOSTIC MISC, by in vitro route., Disp: , Rfl:   •  blood sugar diagnostic strip, , Disp: , Rfl:   •  buPROPion XL (Wellbutrin XL) 300 mg 24 hr tablet, TAKE 1 TABLET BY MOUTH DAILY, Disp: 90 tablet, Rfl: 1  •  calcium carbonate-vitamin D3 600 mg-10 mcg (400 unit) chewable tablet, Chew 1 tablet once daily., Disp: , Rfl:   •  cyanocobalamin (Vitamin B-12) 1,000 mcg tablet, TAKE ONE TABLET BY MOUTH DAILY, Disp: 90 tablet, Rfl: 1  •  desvenlafaxine 100 mg 24 hr tablet, Take 1 tablet (100 mg) by mouth once daily., Disp: 90 tablet, Rfl: 1  •  esomeprazole (NexIUM) 40 mg DR capsule, Take 1 capsule (40 mg) by mouth once daily in the morning. Take before meals., Disp: 90 capsule, Rfl: 1  •  fluticasone (Flonase) 50 mcg/actuation nasal spray, Administer 2 sprays into each nostril once daily., Disp: , Rfl:   •  gabapentin (Neurontin) 300 mg capsule, Take 1 capsule (300 mg) by mouth 3 times a day., Disp: 90 capsule, Rfl: 5  •  glipiZIDE XL (Glucotrol XL) 10 mg 24 hr tablet, Take 1 tablet (10 mg) by mouth once daily. Do not crush, chew, or split., Disp: 30 tablet, Rfl: 11  •  hydrOXYzine pamoate (Vistaril) 25 mg capsule, Take 1 capsule (25 mg) by mouth 3 times a day as needed for anxiety., Disp: 90 capsule, Rfl: 0  •  hydrOXYzine pamoate (Vistaril) 25 mg capsule, Take 1 capsule (25 mg) by mouth 3 times a day as needed for anxiety., Disp: 30 capsule, Rfl: 3  •  lancets misc, Test twice daily, Disp: 100 each, Rfl: 11  •  loperamide (Imodium) 2 mg capsule, Take by mouth., Disp: , Rfl:   •  losartan (Cozaar) 50 mg tablet, Take 1 tablet (50 mg) by mouth once daily., Disp: 30 tablet, Rfl: 11  •  metoprolol tartrate (Lopressor) 100 mg tablet, Take 1 tablet (100 mg) by mouth 2 times a day., Disp: 60 tablet, Rfl: 5  •  nitroglycerin (Nitrostat) 0.4 mg  SL tablet, Place 1 tablet (0.4 mg) under the tongue every 5 minutes if needed for chest pain., Disp: 30 tablet, Rfl: 1  •  rosuvastatin (Crestor) 5 mg tablet, TAKE ONE TABLET BY MOUTH DAILY, Disp: 90 tablet, Rfl: 1  •  traMADol (Ultram) 50 mg tablet, Take 1 tablet (50 mg) by mouth every 6 hours if needed for moderate pain (4 - 6)., Disp: 10 tablet, Rfl: 0  •  traZODone (Desyrel) 50 mg tablet, TAKE ONE TABLET BY MOUTH EVERY NIGHT AT BEDTIME, Disp: 90 tablet, Rfl: 1   Past Medical History:   Diagnosis Date   • Hyperlipidemia, unspecified 07/14/2022    Hyperlipidemia LDL goal <70   • Hypertension    • MI (myocardial infarction) (Multi) 2014      Past Surgical History:   Procedure Laterality Date   • OTHER SURGICAL HISTORY  06/02/2022    Knee surgery   • OTHER SURGICAL HISTORY  06/02/2022    Mastectomy   • OTHER SURGICAL HISTORY  06/02/2022    Knee replacement   • OTHER SURGICAL HISTORY  06/02/2022    Carpal tunnel surgery        Family History   Problem Relation Name Age of Onset   • No Known Problems Mother     • No Known Problems Father          Assessment/Plan  Problem List Items Addressed This Visit       Acute cough - Primary    Nasal congestion    Abscess of face   Continue on Levaquin for her abscess.  Get covid 19, Influenza A&B and RSV to eval for cough and congestion.  She is also currently on Metronidazole vag gel for BV.   Cont to monitor.        Electronically Signed By: LISA Barahona-CNP   1/29/25 12:37 PM

## 2025-01-28 ENCOUNTER — LAB REQUISITION (OUTPATIENT)
Dept: LAB | Facility: HOSPITAL | Age: 70
End: 2025-01-28
Payer: MEDICARE

## 2025-01-28 DIAGNOSIS — J02.9 ACUTE PHARYNGITIS, UNSPECIFIED: ICD-10-CM

## 2025-01-28 LAB
FLUAV RNA RESP QL NAA+PROBE: NOT DETECTED
FLUBV RNA RESP QL NAA+PROBE: NOT DETECTED
RSV RNA RESP QL NAA+PROBE: NOT DETECTED
SARS-COV-2 RNA RESP QL NAA+PROBE: NOT DETECTED

## 2025-01-28 PROCEDURE — 87634 RSV DNA/RNA AMP PROBE: CPT | Mod: OUT | Performed by: NURSE PRACTITIONER

## 2025-01-29 PROBLEM — L02.01 ABSCESS OF FACE: Status: ACTIVE | Noted: 2025-01-29

## 2025-01-29 PROBLEM — R09.81 NASAL CONGESTION: Status: ACTIVE | Noted: 2025-01-29

## 2025-01-29 PROBLEM — R05.1 ACUTE COUGH: Status: ACTIVE | Noted: 2025-01-29

## 2025-01-29 ASSESSMENT — ENCOUNTER SYMPTOMS
RHINORRHEA: 1
PALPITATIONS: 0
FEVER: 0
CONSTIPATION: 0
DIARRHEA: 0
WHEEZING: 0
NAUSEA: 0
SHORTNESS OF BREATH: 0
ABDOMINAL PAIN: 0
VOMITING: 0
ABDOMINAL DISTENTION: 0
COUGH: 1

## 2025-01-29 NOTE — PROGRESS NOTES
Subjective   Patient ID: Silva Corrigan is a 69 y.o. female who presents for No chief complaint on file..  68 yo female resident of [facility] with recent history of cellulitis of the face.  She was put on Levaquin.  Staff states resident has developed a cough and nasal congestion for the past couple of days.          Review of Systems   Constitutional:  Negative for fever.   HENT:  Positive for congestion and rhinorrhea.    Respiratory:  Positive for cough. Negative for shortness of breath and wheezing.    Cardiovascular:  Negative for chest pain and palpitations.   Gastrointestinal:  Negative for abdominal distention, abdominal pain, constipation, diarrhea, nausea and vomiting.       Objective   Physical Exam  Vitals and nursing note reviewed.   Constitutional:       General: She is not in acute distress.  HENT:      Head: Normocephalic.      Nose: Congestion present.   Cardiovascular:      Rate and Rhythm: Normal rate and regular rhythm.      Pulses: Normal pulses.      Heart sounds: Normal heart sounds. No murmur heard.     No friction rub. No gallop.   Pulmonary:      Effort: Pulmonary effort is normal.      Breath sounds: Normal breath sounds. No wheezing, rhonchi or rales.   Abdominal:      General: Bowel sounds are normal.   Musculoskeletal:      Right lower leg: No edema.      Left lower leg: No edema.   Skin:     General: Skin is warm and dry.      Comments: Abscess significantly improved from previously.  Minimal erythema noted.     Neurological:      Mental Status: She is alert.   Psychiatric:         Mood and Affect: Mood normal.       There were no vitals taken for this visit.      Current Outpatient Medications:     acetaminophen (Tylenol) 500 mg tablet, Take 1 tablet (500 mg) by mouth every 6 hours if needed., Disp: , Rfl:     aspirin 81 mg chewable tablet, Chew 1 tablet (81 mg)., Disp: , Rfl:     atorvastatin (Lipitor) 20 mg tablet, Take 1 tablet (20 mg) by mouth once daily at bedtime., Disp: , Rfl:      blood sugar diagnostic (Blood Glucose Test) strip, 100 strips 2 times a day., Disp: 100 strip, Rfl: 11    BLOOD SUGAR DIAGNOSTIC MISC, by in vitro route., Disp: , Rfl:     blood sugar diagnostic strip, , Disp: , Rfl:     buPROPion XL (Wellbutrin XL) 300 mg 24 hr tablet, TAKE 1 TABLET BY MOUTH DAILY, Disp: 90 tablet, Rfl: 1    calcium carbonate-vitamin D3 600 mg-10 mcg (400 unit) chewable tablet, Chew 1 tablet once daily., Disp: , Rfl:     cyanocobalamin (Vitamin B-12) 1,000 mcg tablet, TAKE ONE TABLET BY MOUTH DAILY, Disp: 90 tablet, Rfl: 1    desvenlafaxine 100 mg 24 hr tablet, Take 1 tablet (100 mg) by mouth once daily., Disp: 90 tablet, Rfl: 1    esomeprazole (NexIUM) 40 mg DR capsule, Take 1 capsule (40 mg) by mouth once daily in the morning. Take before meals., Disp: 90 capsule, Rfl: 1    fluticasone (Flonase) 50 mcg/actuation nasal spray, Administer 2 sprays into each nostril once daily., Disp: , Rfl:     gabapentin (Neurontin) 300 mg capsule, Take 1 capsule (300 mg) by mouth 3 times a day., Disp: 90 capsule, Rfl: 5    glipiZIDE XL (Glucotrol XL) 10 mg 24 hr tablet, Take 1 tablet (10 mg) by mouth once daily. Do not crush, chew, or split., Disp: 30 tablet, Rfl: 11    hydrOXYzine pamoate (Vistaril) 25 mg capsule, Take 1 capsule (25 mg) by mouth 3 times a day as needed for anxiety., Disp: 90 capsule, Rfl: 0    hydrOXYzine pamoate (Vistaril) 25 mg capsule, Take 1 capsule (25 mg) by mouth 3 times a day as needed for anxiety., Disp: 30 capsule, Rfl: 3    lancets misc, Test twice daily, Disp: 100 each, Rfl: 11    loperamide (Imodium) 2 mg capsule, Take by mouth., Disp: , Rfl:     losartan (Cozaar) 50 mg tablet, Take 1 tablet (50 mg) by mouth once daily., Disp: 30 tablet, Rfl: 11    metoprolol tartrate (Lopressor) 100 mg tablet, Take 1 tablet (100 mg) by mouth 2 times a day., Disp: 60 tablet, Rfl: 5    nitroglycerin (Nitrostat) 0.4 mg SL tablet, Place 1 tablet (0.4 mg) under the tongue every 5 minutes if needed for  chest pain., Disp: 30 tablet, Rfl: 1    rosuvastatin (Crestor) 5 mg tablet, TAKE ONE TABLET BY MOUTH DAILY, Disp: 90 tablet, Rfl: 1    traMADol (Ultram) 50 mg tablet, Take 1 tablet (50 mg) by mouth every 6 hours if needed for moderate pain (4 - 6)., Disp: 10 tablet, Rfl: 0    traZODone (Desyrel) 50 mg tablet, TAKE ONE TABLET BY MOUTH EVERY NIGHT AT BEDTIME, Disp: 90 tablet, Rfl: 1   Past Medical History:   Diagnosis Date    Hyperlipidemia, unspecified 07/14/2022    Hyperlipidemia LDL goal <70    Hypertension     MI (myocardial infarction) (Multi) 2014      Past Surgical History:   Procedure Laterality Date    OTHER SURGICAL HISTORY  06/02/2022    Knee surgery    OTHER SURGICAL HISTORY  06/02/2022    Mastectomy    OTHER SURGICAL HISTORY  06/02/2022    Knee replacement    OTHER SURGICAL HISTORY  06/02/2022    Carpal tunnel surgery        Family History   Problem Relation Name Age of Onset    No Known Problems Mother      No Known Problems Father          Assessment/Plan   Problem List Items Addressed This Visit       Acute cough - Primary    Nasal congestion    Abscess of face   Continue on Levaquin for her abscess.  Get covid 19, Influenza A&B and RSV to eval for cough and congestion.  She is also currently on Metronidazole vag gel for BV.   Cont to monitor.

## 2025-01-30 ENCOUNTER — NURSING HOME VISIT (OUTPATIENT)
Dept: POST ACUTE CARE | Facility: EXTERNAL LOCATION | Age: 70
End: 2025-01-30
Payer: MEDICARE

## 2025-01-30 DIAGNOSIS — B34.9 VIRAL SYNDROME: Primary | ICD-10-CM

## 2025-01-30 PROCEDURE — 99308 SBSQ NF CARE LOW MDM 20: CPT | Performed by: NURSE PRACTITIONER

## 2025-01-30 NOTE — LETTER
Patient: Silva Corrigan  : 1955    Encounter Date: 2025    Subjective  Patient ID: Silva Corrigan is a 69 y.o. female who presents for No chief complaint on file..  70 yo female resident at [facility] with wheezing noted by staff today.  Resident is currently on Levaquin for facial abscess.  Patient denies any sob or cp.          Review of Systems   Constitutional:  Negative for chills and fever.   HENT: Negative.     Respiratory:  Positive for wheezing. Negative for cough and shortness of breath.    Cardiovascular:  Negative for chest pain and palpitations.   Gastrointestinal:  Negative for diarrhea, nausea and vomiting.       Objective  Physical Exam  Vitals and nursing note reviewed.   Constitutional:       General: She is not in acute distress.  HENT:      Head: Normocephalic.   Cardiovascular:      Rate and Rhythm: Normal rate and regular rhythm.      Pulses: Normal pulses.      Heart sounds: Normal heart sounds. No murmur heard.     No friction rub. No gallop.   Pulmonary:      Effort: Pulmonary effort is normal.      Breath sounds: Wheezing present. No rhonchi or rales.      Comments: Wheezing heard lower bases posteriorly on expiration.  Abdominal:      General: Bowel sounds are normal.   Musculoskeletal:      Right lower leg: No edema.      Left lower leg: No edema.   Skin:     General: Skin is warm and dry.   Neurological:      Mental Status: She is alert.   Psychiatric:         Mood and Affect: Mood normal.       There were no vitals taken for this visit.      Current Outpatient Medications:   •  acetaminophen (Tylenol) 500 mg tablet, Take 1 tablet (500 mg) by mouth every 6 hours if needed., Disp: , Rfl:   •  aspirin 81 mg chewable tablet, Chew 1 tablet (81 mg)., Disp: , Rfl:   •  atorvastatin (Lipitor) 20 mg tablet, Take 1 tablet (20 mg) by mouth once daily at bedtime., Disp: , Rfl:   •  blood sugar diagnostic (Blood Glucose Test) strip, 100 strips 2 times a day., Disp: 100 strip, Rfl: 11  •  BLOOD  SUGAR DIAGNOSTIC MISC, by in vitro route., Disp: , Rfl:   •  blood sugar diagnostic strip, , Disp: , Rfl:   •  buPROPion XL (Wellbutrin XL) 300 mg 24 hr tablet, TAKE 1 TABLET BY MOUTH DAILY, Disp: 90 tablet, Rfl: 1  •  calcium carbonate-vitamin D3 600 mg-10 mcg (400 unit) chewable tablet, Chew 1 tablet once daily., Disp: , Rfl:   •  cyanocobalamin (Vitamin B-12) 1,000 mcg tablet, TAKE ONE TABLET BY MOUTH DAILY, Disp: 90 tablet, Rfl: 1  •  desvenlafaxine 100 mg 24 hr tablet, Take 1 tablet (100 mg) by mouth once daily., Disp: 90 tablet, Rfl: 1  •  esomeprazole (NexIUM) 40 mg DR capsule, Take 1 capsule (40 mg) by mouth once daily in the morning. Take before meals., Disp: 90 capsule, Rfl: 1  •  fluticasone (Flonase) 50 mcg/actuation nasal spray, Administer 2 sprays into each nostril once daily., Disp: , Rfl:   •  gabapentin (Neurontin) 300 mg capsule, Take 1 capsule (300 mg) by mouth 3 times a day., Disp: 90 capsule, Rfl: 5  •  glipiZIDE XL (Glucotrol XL) 10 mg 24 hr tablet, Take 1 tablet (10 mg) by mouth once daily. Do not crush, chew, or split., Disp: 30 tablet, Rfl: 11  •  hydrOXYzine pamoate (Vistaril) 25 mg capsule, Take 1 capsule (25 mg) by mouth 3 times a day as needed for anxiety., Disp: 90 capsule, Rfl: 0  •  hydrOXYzine pamoate (Vistaril) 25 mg capsule, Take 1 capsule (25 mg) by mouth 3 times a day as needed for anxiety., Disp: 30 capsule, Rfl: 3  •  lancets misc, Test twice daily, Disp: 100 each, Rfl: 11  •  loperamide (Imodium) 2 mg capsule, Take by mouth., Disp: , Rfl:   •  losartan (Cozaar) 50 mg tablet, Take 1 tablet (50 mg) by mouth once daily., Disp: 30 tablet, Rfl: 11  •  metoprolol tartrate (Lopressor) 100 mg tablet, Take 1 tablet (100 mg) by mouth 2 times a day., Disp: 60 tablet, Rfl: 5  •  nitroglycerin (Nitrostat) 0.4 mg SL tablet, Place 1 tablet (0.4 mg) under the tongue every 5 minutes if needed for chest pain., Disp: 30 tablet, Rfl: 1  •  rosuvastatin (Crestor) 5 mg tablet, TAKE ONE TABLET BY  MOUTH DAILY, Disp: 90 tablet, Rfl: 1  •  traMADol (Ultram) 50 mg tablet, Take 1 tablet (50 mg) by mouth every 6 hours if needed for moderate pain (4 - 6)., Disp: 10 tablet, Rfl: 0  •  traZODone (Desyrel) 50 mg tablet, TAKE ONE TABLET BY MOUTH EVERY NIGHT AT BEDTIME, Disp: 90 tablet, Rfl: 1   Past Medical History:   Diagnosis Date   • Hyperlipidemia, unspecified 07/14/2022    Hyperlipidemia LDL goal <70   • Hypertension    • MI (myocardial infarction) (Multi) 2014      Past Surgical History:   Procedure Laterality Date   • OTHER SURGICAL HISTORY  06/02/2022    Knee surgery   • OTHER SURGICAL HISTORY  06/02/2022    Mastectomy   • OTHER SURGICAL HISTORY  06/02/2022    Knee replacement   • OTHER SURGICAL HISTORY  06/02/2022    Carpal tunnel surgery        Family History   Problem Relation Name Age of Onset   • No Known Problems Mother     • No Known Problems Father          Assessment/Plan  Problem List Items Addressed This Visit    None  Visit Diagnoses       Viral syndrome    -  Primary        Continue on Levaquin as directed; facial abscess almost completely resolved.  Will start her on Duoneb q6hrs x 7 days for her wheezing.  Covid, Influenza A&B and RSV negative.   Continue to monitor.        Electronically Signed By: ROWENA Barahona   2/2/25  5:18 PM

## 2025-02-02 ASSESSMENT — ENCOUNTER SYMPTOMS
FEVER: 0
PALPITATIONS: 0
NAUSEA: 0
WHEEZING: 1
SHORTNESS OF BREATH: 0
CHILLS: 0
COUGH: 0
VOMITING: 0
DIARRHEA: 0

## 2025-02-02 NOTE — PROGRESS NOTES
Subjective   Patient ID: Silva Corrigan is a 69 y.o. female who presents for No chief complaint on file..  68 yo female resident at [facility] with wheezing noted by staff today.  Resident is currently on Levaquin for facial abscess.  Patient denies any sob or cp.          Review of Systems   Constitutional:  Negative for chills and fever.   HENT: Negative.     Respiratory:  Positive for wheezing. Negative for cough and shortness of breath.    Cardiovascular:  Negative for chest pain and palpitations.   Gastrointestinal:  Negative for diarrhea, nausea and vomiting.       Objective   Physical Exam  Vitals and nursing note reviewed.   Constitutional:       General: She is not in acute distress.  HENT:      Head: Normocephalic.   Cardiovascular:      Rate and Rhythm: Normal rate and regular rhythm.      Pulses: Normal pulses.      Heart sounds: Normal heart sounds. No murmur heard.     No friction rub. No gallop.   Pulmonary:      Effort: Pulmonary effort is normal.      Breath sounds: Wheezing present. No rhonchi or rales.      Comments: Wheezing heard lower bases posteriorly on expiration.  Abdominal:      General: Bowel sounds are normal.   Musculoskeletal:      Right lower leg: No edema.      Left lower leg: No edema.   Skin:     General: Skin is warm and dry.   Neurological:      Mental Status: She is alert.   Psychiatric:         Mood and Affect: Mood normal.       There were no vitals taken for this visit.      Current Outpatient Medications:     acetaminophen (Tylenol) 500 mg tablet, Take 1 tablet (500 mg) by mouth every 6 hours if needed., Disp: , Rfl:     aspirin 81 mg chewable tablet, Chew 1 tablet (81 mg)., Disp: , Rfl:     atorvastatin (Lipitor) 20 mg tablet, Take 1 tablet (20 mg) by mouth once daily at bedtime., Disp: , Rfl:     blood sugar diagnostic (Blood Glucose Test) strip, 100 strips 2 times a day., Disp: 100 strip, Rfl: 11    BLOOD SUGAR DIAGNOSTIC MISC, by in vitro route., Disp: , Rfl:     blood sugar  diagnostic strip, , Disp: , Rfl:     buPROPion XL (Wellbutrin XL) 300 mg 24 hr tablet, TAKE 1 TABLET BY MOUTH DAILY, Disp: 90 tablet, Rfl: 1    calcium carbonate-vitamin D3 600 mg-10 mcg (400 unit) chewable tablet, Chew 1 tablet once daily., Disp: , Rfl:     cyanocobalamin (Vitamin B-12) 1,000 mcg tablet, TAKE ONE TABLET BY MOUTH DAILY, Disp: 90 tablet, Rfl: 1    desvenlafaxine 100 mg 24 hr tablet, Take 1 tablet (100 mg) by mouth once daily., Disp: 90 tablet, Rfl: 1    esomeprazole (NexIUM) 40 mg DR capsule, Take 1 capsule (40 mg) by mouth once daily in the morning. Take before meals., Disp: 90 capsule, Rfl: 1    fluticasone (Flonase) 50 mcg/actuation nasal spray, Administer 2 sprays into each nostril once daily., Disp: , Rfl:     gabapentin (Neurontin) 300 mg capsule, Take 1 capsule (300 mg) by mouth 3 times a day., Disp: 90 capsule, Rfl: 5    glipiZIDE XL (Glucotrol XL) 10 mg 24 hr tablet, Take 1 tablet (10 mg) by mouth once daily. Do not crush, chew, or split., Disp: 30 tablet, Rfl: 11    hydrOXYzine pamoate (Vistaril) 25 mg capsule, Take 1 capsule (25 mg) by mouth 3 times a day as needed for anxiety., Disp: 90 capsule, Rfl: 0    hydrOXYzine pamoate (Vistaril) 25 mg capsule, Take 1 capsule (25 mg) by mouth 3 times a day as needed for anxiety., Disp: 30 capsule, Rfl: 3    lancets misc, Test twice daily, Disp: 100 each, Rfl: 11    loperamide (Imodium) 2 mg capsule, Take by mouth., Disp: , Rfl:     losartan (Cozaar) 50 mg tablet, Take 1 tablet (50 mg) by mouth once daily., Disp: 30 tablet, Rfl: 11    metoprolol tartrate (Lopressor) 100 mg tablet, Take 1 tablet (100 mg) by mouth 2 times a day., Disp: 60 tablet, Rfl: 5    nitroglycerin (Nitrostat) 0.4 mg SL tablet, Place 1 tablet (0.4 mg) under the tongue every 5 minutes if needed for chest pain., Disp: 30 tablet, Rfl: 1    rosuvastatin (Crestor) 5 mg tablet, TAKE ONE TABLET BY MOUTH DAILY, Disp: 90 tablet, Rfl: 1    traMADol (Ultram) 50 mg tablet, Take 1 tablet (50  mg) by mouth every 6 hours if needed for moderate pain (4 - 6)., Disp: 10 tablet, Rfl: 0    traZODone (Desyrel) 50 mg tablet, TAKE ONE TABLET BY MOUTH EVERY NIGHT AT BEDTIME, Disp: 90 tablet, Rfl: 1   Past Medical History:   Diagnosis Date    Hyperlipidemia, unspecified 07/14/2022    Hyperlipidemia LDL goal <70    Hypertension     MI (myocardial infarction) (Multi) 2014      Past Surgical History:   Procedure Laterality Date    OTHER SURGICAL HISTORY  06/02/2022    Knee surgery    OTHER SURGICAL HISTORY  06/02/2022    Mastectomy    OTHER SURGICAL HISTORY  06/02/2022    Knee replacement    OTHER SURGICAL HISTORY  06/02/2022    Carpal tunnel surgery        Family History   Problem Relation Name Age of Onset    No Known Problems Mother      No Known Problems Father          Assessment/Plan   Problem List Items Addressed This Visit    None  Visit Diagnoses       Viral syndrome    -  Primary        Continue on Levaquin as directed; facial abscess almost completely resolved.  Will start her on Duoneb q6hrs x 7 days for her wheezing.  Covid, Influenza A&B and RSV negative.   Continue to monitor.

## 2025-02-20 ENCOUNTER — NURSING HOME VISIT (OUTPATIENT)
Dept: POST ACUTE CARE | Facility: EXTERNAL LOCATION | Age: 70
End: 2025-02-20
Payer: MEDICARE

## 2025-02-20 DIAGNOSIS — F01.A0 MILD VASCULAR DEMENTIA WITHOUT BEHAVIORAL DISTURBANCE, PSYCHOTIC DISTURBANCE, MOOD DISTURBANCE, OR ANXIETY: ICD-10-CM

## 2025-02-20 DIAGNOSIS — E66.01 OBESITY, MORBID (MULTI): ICD-10-CM

## 2025-02-20 DIAGNOSIS — Z79.4 TYPE 2 DIABETES MELLITUS WITH DIABETIC POLYNEUROPATHY, WITH LONG-TERM CURRENT USE OF INSULIN: Primary | ICD-10-CM

## 2025-02-20 DIAGNOSIS — E11.42 TYPE 2 DIABETES MELLITUS WITH DIABETIC POLYNEUROPATHY, WITH LONG-TERM CURRENT USE OF INSULIN: Primary | ICD-10-CM

## 2025-02-20 PROCEDURE — 99308 SBSQ NF CARE LOW MDM 20: CPT | Performed by: INTERNAL MEDICINE

## 2025-02-20 NOTE — PROGRESS NOTES
Pt was seen in the NH.  Pt is in usual state , no complaint  General appearance: Comfortable, no distress  ROS: No SOB  Medications reviewed  Head: Normal  Neck: Soft  Heart: Regular  Lungs: Clear  Abdomen: soft    Plan:   1)clinically doing fine, dementia stable  2) To continue gabapentin 400 mg tid, lantus 8 units daily    Problem List Items Addressed This Visit       Vascular dementia    Obesity, morbid (Multi)    Type 2 diabetes mellitus with diabetic polyneuropathy, with long-term current use of insulin - Primary

## 2025-02-20 NOTE — LETTER
Patient: Silva Corrigan  : 1955    Encounter Date: 2025    Pt was seen in the NH.  Pt is in usual state , no complaint  General appearance: Comfortable, no distress  ROS: No SOB  Medications reviewed  Head: Normal  Neck: Soft  Heart: Regular  Lungs: Clear  Abdomen: soft    Plan:   1)clinically doing fine, dementia stable  2) To continue gabapentin 400 mg tid, lantus 8 units daily    Problem List Items Addressed This Visit       Vascular dementia    Obesity, morbid (Multi)    Type 2 diabetes mellitus with diabetic polyneuropathy, with long-term current use of insulin - Primary          Electronically Signed By: Chevy Ramachandran MD   25  5:00 PM

## 2025-03-02 ENCOUNTER — LAB REQUISITION (OUTPATIENT)
Dept: LAB | Facility: HOSPITAL | Age: 70
End: 2025-03-02
Payer: MEDICARE

## 2025-03-02 DIAGNOSIS — N39.0 URINARY TRACT INFECTION, SITE NOT SPECIFIED: ICD-10-CM

## 2025-03-02 DIAGNOSIS — R35.0 FREQUENCY OF MICTURITION: ICD-10-CM

## 2025-03-02 DIAGNOSIS — R41.82 ALTERED MENTAL STATUS, UNSPECIFIED: ICD-10-CM

## 2025-03-02 LAB
AMORPH CRY #/AREA UR COMP ASSIST: ABNORMAL /HPF
APPEARANCE UR: ABNORMAL
BACTERIA #/AREA URNS AUTO: ABNORMAL /HPF
BILIRUB UR STRIP.AUTO-MCNC: NEGATIVE MG/DL
COLOR UR: ABNORMAL
GLUCOSE UR STRIP.AUTO-MCNC: ABNORMAL MG/DL
KETONES UR STRIP.AUTO-MCNC: NEGATIVE MG/DL
LEUKOCYTE ESTERASE UR QL STRIP.AUTO: ABNORMAL
MUCOUS THREADS #/AREA URNS AUTO: ABNORMAL /LPF
NITRITE UR QL STRIP.AUTO: ABNORMAL
PH UR STRIP.AUTO: 5.5 [PH]
PROT UR STRIP.AUTO-MCNC: NEGATIVE MG/DL
RBC # UR STRIP.AUTO: NEGATIVE MG/DL
RBC #/AREA URNS AUTO: ABNORMAL /HPF
SP GR UR STRIP.AUTO: 1.01
SQUAMOUS #/AREA URNS AUTO: ABNORMAL /HPF
UROBILINOGEN UR STRIP.AUTO-MCNC: NORMAL MG/DL
WBC #/AREA URNS AUTO: >50 /HPF
WBC CLUMPS #/AREA URNS AUTO: ABNORMAL /HPF

## 2025-03-02 PROCEDURE — 87086 URINE CULTURE/COLONY COUNT: CPT | Mod: OUT,SAMLAB | Performed by: INTERNAL MEDICINE

## 2025-03-02 PROCEDURE — 81001 URINALYSIS AUTO W/SCOPE: CPT | Mod: OUT | Performed by: INTERNAL MEDICINE

## 2025-03-05 ENCOUNTER — HOSPITAL ENCOUNTER (EMERGENCY)
Facility: HOSPITAL | Age: 70
Discharge: HOME | End: 2025-03-05
Attending: EMERGENCY MEDICINE
Payer: MEDICARE

## 2025-03-05 ENCOUNTER — HOSPITAL ENCOUNTER (OUTPATIENT)
Dept: CARDIOLOGY | Facility: HOSPITAL | Age: 70
Discharge: HOME | End: 2025-03-05
Payer: MEDICARE

## 2025-03-05 ENCOUNTER — APPOINTMENT (OUTPATIENT)
Dept: RADIOLOGY | Facility: HOSPITAL | Age: 70
End: 2025-03-05
Payer: MEDICARE

## 2025-03-05 VITALS
RESPIRATION RATE: 20 BRPM | OXYGEN SATURATION: 94 % | BODY MASS INDEX: 36.61 KG/M2 | DIASTOLIC BLOOD PRESSURE: 53 MMHG | HEART RATE: 80 BPM | SYSTOLIC BLOOD PRESSURE: 125 MMHG | WEIGHT: 220 LBS | TEMPERATURE: 97 F

## 2025-03-05 DIAGNOSIS — S02.2XXA CLOSED FRACTURE OF NASAL BONE, INITIAL ENCOUNTER: ICD-10-CM
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
## 2025-03-05 DIAGNOSIS — S01.511A LIP LACERATION, INITIAL ENCOUNTER: ICD-10-CM

## 2025-03-05 LAB — BACTERIA UR CULT: ABNORMAL

## 2025-03-05 PROCEDURE — 70486 CT MAXILLOFACIAL W/O DYE: CPT | Performed by: RADIOLOGY

## 2025-03-05 PROCEDURE — 70450 CT HEAD/BRAIN W/O DYE: CPT | Performed by: RADIOLOGY

## 2025-03-05 PROCEDURE — 70486 CT MAXILLOFACIAL W/O DYE: CPT

## 2025-03-05 PROCEDURE — 72125 CT NECK SPINE W/O DYE: CPT | Performed by: RADIOLOGY

## 2025-03-05 PROCEDURE — 12051 INTMD RPR FACE/MM 2.5 CM/<: CPT

## 2025-03-05 PROCEDURE — 76377 3D RENDER W/INTRP POSTPROCES: CPT

## 2025-03-05 PROCEDURE — 99284 EMERGENCY DEPT VISIT MOD MDM: CPT | Mod: 25 | Performed by: EMERGENCY MEDICINE

## 2025-03-05 PROCEDURE — 76377 3D RENDER W/INTRP POSTPROCES: CPT | Performed by: RADIOLOGY

## 2025-03-05 PROCEDURE — 72125 CT NECK SPINE W/O DYE: CPT

## 2025-03-05 PROCEDURE — 2500000005 HC RX 250 GENERAL PHARMACY W/O HCPCS: Performed by: EMERGENCY MEDICINE

## 2025-03-05 PROCEDURE — 93005 ELECTROCARDIOGRAM TRACING: CPT | Mod: 59

## 2025-03-05 PROCEDURE — 70450 CT HEAD/BRAIN W/O DYE: CPT

## 2025-03-05 RX ORDER — BACITRACIN ZINC 500 UNIT/G
1 OINTMENT IN PACKET (EA) TOPICAL ONCE
Status: COMPLETED | OUTPATIENT
Start: 2025-03-05 | End: 2025-03-05

## 2025-03-05 RX ORDER — CEPHALEXIN 500 MG/1
500 CAPSULE ORAL 4 TIMES DAILY
Qty: 40 CAPSULE | Refills: 0 | Status: SHIPPED | OUTPATIENT
Start: 2025-03-05 | End: 2025-03-15

## 2025-03-05 RX ADMIN — BACITRACIN ZINC 1 APPLICATION: 500 OINTMENT TOPICAL at 04:38

## 2025-03-05 ASSESSMENT — COLUMBIA-SUICIDE SEVERITY RATING SCALE - C-SSRS
2. HAVE YOU ACTUALLY HAD ANY THOUGHTS OF KILLING YOURSELF?: NO
1. IN THE PAST MONTH, HAVE YOU WISHED YOU WERE DEAD OR WISHED YOU COULD GO TO SLEEP AND NOT WAKE UP?: NO
6. HAVE YOU EVER DONE ANYTHING, STARTED TO DO ANYTHING, OR PREPARED TO DO ANYTHING TO END YOUR LIFE?: NO

## 2025-03-05 ASSESSMENT — PAIN - FUNCTIONAL ASSESSMENT: PAIN_FUNCTIONAL_ASSESSMENT: 0-10

## 2025-03-05 ASSESSMENT — PAIN SCALES - GENERAL: PAINLEVEL_OUTOF10: 8

## 2025-03-05 NOTE — ED PROVIDER NOTES
HPI   Chief Complaint   Patient presents with    Fall     Pt here via EMS from [facility], pt was walking in hallway when she fell striking her face on the ground. Noted nasal deformity and lac to lip, bleeding controlled. Denies blood thinners       69-year-old female presents from local nursing home status post falling hitting her face.  Patient was using a walker when she fell forward stating trauma to the lower lip and nose.  Nose is deformed.  Patient has no other injuries or complaints.  Patient did traumatize her dental plates, both upper and lower.  Patient has a stellate 1 cm laceration involving the lower lip and oral mucosa.  Minimal amount to vermilion border.  This was cleaned and repaired.      History provided by:  Patient and EMS personnel          Patient History   Past Medical History:   Diagnosis Date    Hyperlipidemia, unspecified 07/14/2022    Hyperlipidemia LDL goal <70    Hypertension     MI (myocardial infarction) (Multi) 2014     Past Surgical History:   Procedure Laterality Date    OTHER SURGICAL HISTORY  06/02/2022    Knee surgery    OTHER SURGICAL HISTORY  06/02/2022    Mastectomy    OTHER SURGICAL HISTORY  06/02/2022    Knee replacement    OTHER SURGICAL HISTORY  06/02/2022    Carpal tunnel surgery     Family History   Problem Relation Name Age of Onset    No Known Problems Mother      No Known Problems Father       Social History     Tobacco Use    Smoking status: Some Days     Types: Cigarettes    Smokeless tobacco: Never   Substance Use Topics    Alcohol use: Not Currently    Drug use: Not Currently       Physical Exam   ED Triage Vitals [03/05/25 0346]   Temperature Heart Rate Respirations BP   36.1 °C (97 °F) 77 20 121/79      Pulse Ox Temp src Heart Rate Source Patient Position   95 % -- -- --      BP Location FiO2 (%)     -- --       Physical Exam  Vitals and nursing note reviewed.   Constitutional:       General: She is not in acute distress.     Appearance: She is  well-developed.   HENT:      Head: Normocephalic and atraumatic.      Nose:      Comments: Erythematous and mildly edematous secondary to fall.  Small amount of epistaxis right nostril.     Mouth/Throat:      Mouth: Mucous membranes are moist.      Comments: Stellate laceration involving lower lip and inner mucosa.  This is approximately 1 cm.  Very minimal involvement of vermilion border.  Eyes:      Conjunctiva/sclera: Conjunctivae normal.   Cardiovascular:      Rate and Rhythm: Normal rate and regular rhythm.      Heart sounds: No murmur heard.  Pulmonary:      Effort: Pulmonary effort is normal. No respiratory distress.      Breath sounds: Normal breath sounds.   Abdominal:      Palpations: Abdomen is soft.      Tenderness: There is no abdominal tenderness.   Musculoskeletal:         General: No swelling.      Cervical back: Neck supple.   Skin:     General: Skin is warm and dry.      Capillary Refill: Capillary refill takes less than 2 seconds.   Neurological:      Mental Status: She is alert.   Psychiatric:         Mood and Affect: Mood normal.         CT cervical spine wo IV contrast   Final Result   Right frontal scalp swelling. No underlying depressed calvarial   fracture.        No acute intracranial hemorrhage, mass effect or midline shift.        Nonspecific scattered white matter hypodensities favored to represent   sequela of small vessel ischemia. Comminuted, depressed nasal bone   fractures with overlying laceration. Increased attenuation throughout   the nasal cavity, right greater than left, favored to represent blood   products.        Cervical spondylosis without acute loss of vertebral body height or   traumatic malalignment.             MACRO:   None.        Signed by: Evan Finkelstein 3/5/2025 5:07 AM   Dictation workstation:   [id number]      CT head wo IV contrast   Final Result   Right frontal scalp swelling. No underlying depressed calvarial   fracture.        No acute intracranial  hemorrhage, mass effect or midline shift.        Nonspecific scattered white matter hypodensities favored to represent   sequela of small vessel ischemia. Comminuted, depressed nasal bone   fractures with overlying laceration. Increased attenuation throughout   the nasal cavity, right greater than left, favored to represent blood   products.        Cervical spondylosis without acute loss of vertebral body height or   traumatic malalignment.             MACRO:   None.        Signed by: Evan Finkelstein 3/5/2025 5:07 AM   Dictation workstation:   [id number]      CT maxillofacial bones wo IV contrast   Final Result   Right frontal scalp swelling. No underlying depressed calvarial   fracture.        No acute intracranial hemorrhage, mass effect or midline shift.        Nonspecific scattered white matter hypodensities favored to represent   sequela of small vessel ischemia. Comminuted, depressed nasal bone   fractures with overlying laceration. Increased attenuation throughout   the nasal cavity, right greater than left, favored to represent blood   products.        Cervical spondylosis without acute loss of vertebral body height or   traumatic malalignment.             MACRO:   None.        Signed by: Evan Finkelstein 3/5/2025 5:07 AM   Dictation workstation:   [id number]         ED Course & MDM   ED Course as of 03/05/25 0514   Wed Mar 05, 2025   0413 Twelve-lead EKG interpreted by myself at 0350 1 normal sinus rhythm at 75 2 low voltage 3 nonspecific ST-T wave changes [MS]      ED Course User Index  [MS] Leno BEE Froy,          Diagnoses as of 03/05/25 0514   Fall, initial encounter   Lip laceration, initial encounter   Closed fracture of nasal bone, initial encounter                 No data recorded     Gilda Coma Scale Score: 15 (03/05/25 0353 : Pallavi Coyne RN)                           Medical Decision Making  1 medication as prescribed 2 clean and apply Polysporin twice daily 3 Motrin or Tylenol for  pain 4 neurochecks every 4 hours any changes return to ED 5 follow-up with family medical doctor for suture removal in 5 to 7 days        Procedure  Laceration Repair    Performed by: Leno Mcduffie DO  Authorized by: Leno Mcduffie DO    Consent:     Consent obtained:  Verbal    Consent given by:  Patient    Risks, benefits, and alternatives were discussed: yes      Risks discussed:  Infection and pain  Universal protocol:     Procedure explained and questions answered to patient or proxy's satisfaction: yes      Imaging studies available: yes      Patient identity confirmed:  Verbally with patient  Anesthesia:     Anesthesia method:  Local infiltration    Local anesthetic:  Lidocaine 1% w/o epi  Laceration details:     Location:  Lip    Lip location:  Lower exterior lip    Length (cm):  1    Depth (mm):  3  Pre-procedure details:     Preparation:  Patient was prepped and draped in usual sterile fashion and imaging obtained to evaluate for foreign bodies  Exploration:     Limited defect created (wound extended): no      Imaging outcome: foreign body noted      Wound exploration: entire depth of wound visualized      Contaminated: no    Treatment:     Area cleansed with:  Saline    Amount of cleaning:  Extensive    Irrigation solution:  Sterile water    Visualized foreign bodies/material removed: yes      Debridement:  None    Undermining:  None    Scar revision: no      Layers/structures repaired:  Deep dermal/superficial fascia  Deep dermal/superficial fascia:     Suture size:  5-0    Suture material:  Dexon    Suture technique:  Simple interrupted    Number of sutures:  3  Approximation:     Approximation:  Close    Vermilion border well-aligned: yes    Repair type:     Repair type:  Simple  Post-procedure details:     Dressing:  Antibiotic ointment    Procedure completion:  Tolerated  Comments:      Removed a fractured tooth.       Leno Mcduffie DO  03/05/25 0515

## 2025-03-06 LAB
ATRIAL RATE: 75 BPM
P AXIS: -21 DEGREES
P OFFSET: 211 MS
P ONSET: 159 MS
PR INTERVAL: 130 MS
Q ONSET: 224 MS
QRS COUNT: 12 BEATS
QRS DURATION: 78 MS
QT INTERVAL: 376 MS
QTC CALCULATION(BAZETT): 419 MS
QTC FREDERICIA: 404 MS
R AXIS: 64 DEGREES
T AXIS: -3 DEGREES
T OFFSET: 412 MS
VENTRICULAR RATE: 75 BPM

## 2025-03-13 ENCOUNTER — APPOINTMENT (OUTPATIENT)
Dept: GASTROENTEROLOGY | Facility: CLINIC | Age: 70
End: 2025-03-13
Payer: MEDICARE

## 2025-03-13 ENCOUNTER — APPOINTMENT (OUTPATIENT)
Dept: OPERATING ROOM | Facility: HOSPITAL | Age: 70
End: 2025-03-13
Payer: MEDICARE

## 2025-03-19 ENCOUNTER — NURSING HOME VISIT (OUTPATIENT)
Dept: POST ACUTE CARE | Facility: EXTERNAL LOCATION | Age: 70
End: 2025-03-19
Payer: MEDICARE

## 2025-03-19 DIAGNOSIS — R30.0 DYSURIA: Primary | ICD-10-CM

## 2025-03-19 PROCEDURE — 99308 SBSQ NF CARE LOW MDM 20: CPT | Performed by: INTERNAL MEDICINE

## 2025-03-19 NOTE — PROGRESS NOTES
Pt was seen in the NH.  Pt c/o dysuria  General appearance: Comfortable, no distress  ROS: No SOB  Medications reviewed  Head: Normal  Neck: Soft  Heart: Regular  Lungs: Clear  Abdomen: soft    Plan:   UACS    Problem List Items Addressed This Visit       Dysuria - Primary

## 2025-03-19 NOTE — LETTER
Patient: Silva Corrigan  : 1955    Encounter Date: 2025    Pt was seen in the NH.  Pt c/o dysuria  General appearance: Comfortable, no distress  ROS: No SOB  Medications reviewed  Head: Normal  Neck: Soft  Heart: Regular  Lungs: Clear  Abdomen: soft    Plan:   UACS    Problem List Items Addressed This Visit       Dysuria - Primary          Electronically Signed By: Chevy Ramachandran MD   3/19/25  5:06 PM

## 2025-03-20 ENCOUNTER — LAB REQUISITION (OUTPATIENT)
Dept: LAB | Facility: HOSPITAL | Age: 70
End: 2025-03-20
Payer: MEDICARE

## 2025-03-20 DIAGNOSIS — R30.0 DYSURIA: ICD-10-CM

## 2025-03-20 LAB
APPEARANCE UR: ABNORMAL
BACTERIA #/AREA URNS AUTO: ABNORMAL /HPF
BILIRUB UR STRIP.AUTO-MCNC: NEGATIVE MG/DL
COLOR UR: YELLOW
GLUCOSE UR STRIP.AUTO-MCNC: ABNORMAL MG/DL
KETONES UR STRIP.AUTO-MCNC: NEGATIVE MG/DL
LEUKOCYTE ESTERASE UR QL STRIP.AUTO: ABNORMAL
MUCOUS THREADS #/AREA URNS AUTO: ABNORMAL /LPF
NITRITE UR QL STRIP.AUTO: ABNORMAL
PH UR STRIP.AUTO: 5.5 [PH]
PROT UR STRIP.AUTO-MCNC: ABNORMAL MG/DL
RBC # UR STRIP.AUTO: ABNORMAL MG/DL
RBC #/AREA URNS AUTO: ABNORMAL /HPF
SP GR UR STRIP.AUTO: 1.03
SQUAMOUS #/AREA URNS AUTO: ABNORMAL /HPF
UROBILINOGEN UR STRIP.AUTO-MCNC: NORMAL MG/DL
WBC #/AREA URNS AUTO: >50 /HPF
WBC CLUMPS #/AREA URNS AUTO: ABNORMAL /HPF

## 2025-03-20 PROCEDURE — 87086 URINE CULTURE/COLONY COUNT: CPT | Mod: OUT,SAMLAB | Performed by: NURSE PRACTITIONER

## 2025-03-20 PROCEDURE — 81001 URINALYSIS AUTO W/SCOPE: CPT | Mod: OUT | Performed by: NURSE PRACTITIONER

## 2025-03-22 LAB — BACTERIA UR CULT: ABNORMAL

## 2025-04-03 ENCOUNTER — NURSING HOME VISIT (OUTPATIENT)
Dept: POST ACUTE CARE | Facility: EXTERNAL LOCATION | Age: 70
End: 2025-04-03
Payer: MEDICARE

## 2025-04-03 DIAGNOSIS — R29.6 FREQUENT FALLS: Primary | ICD-10-CM

## 2025-04-03 DIAGNOSIS — R41.0 CONFUSION: ICD-10-CM

## 2025-04-03 PROCEDURE — 99309 SBSQ NF CARE MODERATE MDM 30: CPT | Performed by: NURSE PRACTITIONER

## 2025-04-03 NOTE — LETTER
Patient: Silva Corrigan  : 1955    Encounter Date: 2025    Subjective  Patient ID: Silva Corrigan is a 69 y.o. female who presents for No chief complaint on file..  70 yo female at [facility] with history of dementia, htn, cad, gerd.  Staff reports family would like an evaluation due to her increased dementia.  Chart reviewed with nursing staff.  Vitals stable.  Noted multiple falls in March.  Resident and staff reports she has been working with physical therapy.          Review of Systems   Constitutional:  Negative for appetite change, chills, fatigue and fever.   HENT: Negative.     Respiratory: Negative.     Cardiovascular: Negative.    Gastrointestinal: Negative.    Neurological:  Negative for dizziness, syncope, weakness, light-headedness, numbness and headaches.        Denies feeling dizzy or room spinning prior to falling.  Denies any increased weakness.  States she is aware she has days of confusion.     Psychiatric/Behavioral:  Positive for confusion. Negative for dysphoric mood. The patient is not nervous/anxious.        Objective  Physical Exam  Vitals and nursing note reviewed.   Constitutional:       General: She is not in acute distress.  HENT:      Head: Normocephalic.      Nose: No congestion or rhinorrhea.      Mouth/Throat:      Mouth: Mucous membranes are moist.   Eyes:      Conjunctiva/sclera: Conjunctivae normal.      Pupils: Pupils are equal, round, and reactive to light.   Cardiovascular:      Rate and Rhythm: Normal rate and regular rhythm.      Pulses: Normal pulses.      Heart sounds: Normal heart sounds. No murmur heard.     No friction rub. No gallop.   Pulmonary:      Effort: Pulmonary effort is normal.      Breath sounds: Normal breath sounds. No wheezing, rhonchi or rales.   Abdominal:      General: Bowel sounds are normal.   Musculoskeletal:      Right lower leg: No edema.      Left lower leg: No edema.   Lymphadenopathy:      Cervical: No cervical adenopathy.   Skin:     General:  Skin is warm and dry.   Neurological:      Mental Status: She is alert.      Comments: Strength of upper and lower extremities 5/5.  States she is aware of falling and has tripped over cart in her room.     Psychiatric:         Mood and Affect: Mood normal.      Comments: MMSE 25/30.  Answers most questions appropriately.  She could not remember the day or date however, she knew the month and season.         There were no vitals taken for this visit.      Current Outpatient Medications:   •  acetaminophen (Tylenol) 500 mg tablet, Take 1 tablet (500 mg) by mouth every 6 hours if needed., Disp: , Rfl:   •  aspirin 81 mg chewable tablet, Chew 1 tablet (81 mg)., Disp: , Rfl:   •  atorvastatin (Lipitor) 20 mg tablet, Take 1 tablet (20 mg) by mouth once daily at bedtime., Disp: , Rfl:   •  blood sugar diagnostic (Blood Glucose Test) strip, 100 strips 2 times a day., Disp: 100 strip, Rfl: 11  •  BLOOD SUGAR DIAGNOSTIC MISC, by in vitro route., Disp: , Rfl:   •  blood sugar diagnostic strip, , Disp: , Rfl:   •  buPROPion XL (Wellbutrin XL) 300 mg 24 hr tablet, TAKE 1 TABLET BY MOUTH DAILY, Disp: 90 tablet, Rfl: 1  •  calcium carbonate-vitamin D3 600 mg-10 mcg (400 unit) chewable tablet, Chew 1 tablet once daily., Disp: , Rfl:   •  cyanocobalamin (Vitamin B-12) 1,000 mcg tablet, TAKE ONE TABLET BY MOUTH DAILY, Disp: 90 tablet, Rfl: 1  •  desvenlafaxine 100 mg 24 hr tablet, Take 1 tablet (100 mg) by mouth once daily., Disp: 90 tablet, Rfl: 1  •  esomeprazole (NexIUM) 40 mg DR capsule, Take 1 capsule (40 mg) by mouth once daily in the morning. Take before meals., Disp: 90 capsule, Rfl: 1  •  fluticasone (Flonase) 50 mcg/actuation nasal spray, Administer 2 sprays into each nostril once daily., Disp: , Rfl:   •  gabapentin (Neurontin) 300 mg capsule, Take 1 capsule (300 mg) by mouth 3 times a day., Disp: 90 capsule, Rfl: 5  •  glipiZIDE XL (Glucotrol XL) 10 mg 24 hr tablet, Take 1 tablet (10 mg) by mouth once daily. Do not  crush, chew, or split., Disp: 30 tablet, Rfl: 11  •  hydrOXYzine pamoate (Vistaril) 25 mg capsule, Take 1 capsule (25 mg) by mouth 3 times a day as needed for anxiety., Disp: 90 capsule, Rfl: 0  •  hydrOXYzine pamoate (Vistaril) 25 mg capsule, Take 1 capsule (25 mg) by mouth 3 times a day as needed for anxiety., Disp: 30 capsule, Rfl: 3  •  lancets misc, Test twice daily, Disp: 100 each, Rfl: 11  •  loperamide (Imodium) 2 mg capsule, Take by mouth., Disp: , Rfl:   •  losartan (Cozaar) 50 mg tablet, Take 1 tablet (50 mg) by mouth once daily., Disp: 30 tablet, Rfl: 11  •  metoprolol tartrate (Lopressor) 100 mg tablet, Take 1 tablet (100 mg) by mouth 2 times a day., Disp: 60 tablet, Rfl: 5  •  nitroglycerin (Nitrostat) 0.4 mg SL tablet, Place 1 tablet (0.4 mg) under the tongue every 5 minutes if needed for chest pain., Disp: 30 tablet, Rfl: 1  •  rosuvastatin (Crestor) 5 mg tablet, TAKE ONE TABLET BY MOUTH DAILY, Disp: 90 tablet, Rfl: 1  •  traMADol (Ultram) 50 mg tablet, Take 1 tablet (50 mg) by mouth every 6 hours if needed for moderate pain (4 - 6)., Disp: 10 tablet, Rfl: 0  •  traZODone (Desyrel) 50 mg tablet, TAKE ONE TABLET BY MOUTH EVERY NIGHT AT BEDTIME, Disp: 90 tablet, Rfl: 1   Past Medical History:   Diagnosis Date   • Hyperlipidemia, unspecified 07/14/2022    Hyperlipidemia LDL goal <70   • Hypertension    • MI (myocardial infarction) (Multi) 2014      Past Surgical History:   Procedure Laterality Date   • OTHER SURGICAL HISTORY  06/02/2022    Knee surgery   • OTHER SURGICAL HISTORY  06/02/2022    Mastectomy   • OTHER SURGICAL HISTORY  06/02/2022    Knee replacement   • OTHER SURGICAL HISTORY  06/02/2022    Carpal tunnel surgery        Family History   Problem Relation Name Age of Onset   • No Known Problems Mother     • No Known Problems Father          Assessment/Plan  Problem List Items Addressed This Visit       Frequent falls - Primary    Confusion   Will obtain labs and get CT brain without contrast.   Continue to monitor.      Electronically Signed By: ROWENA Barahona   4/6/25  6:55 PM

## 2025-04-06 PROBLEM — R41.0 CONFUSION: Status: ACTIVE | Noted: 2025-04-06

## 2025-04-06 ASSESSMENT — ENCOUNTER SYMPTOMS
CONFUSION: 1
DIZZINESS: 0
APPETITE CHANGE: 0
LIGHT-HEADEDNESS: 0
HEADACHES: 0
WEAKNESS: 0
RESPIRATORY NEGATIVE: 1
NERVOUS/ANXIOUS: 0
NUMBNESS: 0
DYSPHORIC MOOD: 0
GASTROINTESTINAL NEGATIVE: 1
CARDIOVASCULAR NEGATIVE: 1
FATIGUE: 0
CHILLS: 0
FEVER: 0

## 2025-04-06 NOTE — PROGRESS NOTES
Subjective   Patient ID: Silva Corrigan is a 69 y.o. female who presents for No chief complaint on file..  68 yo female at [facility] with history of dementia, htn, cad, gerd.  Staff reports family would like an evaluation due to her increased dementia.  Chart reviewed with nursing staff.  Vitals stable.  Noted multiple falls in March.  Resident and staff reports she has been working with physical therapy.          Review of Systems   Constitutional:  Negative for appetite change, chills, fatigue and fever.   HENT: Negative.     Respiratory: Negative.     Cardiovascular: Negative.    Gastrointestinal: Negative.    Neurological:  Negative for dizziness, syncope, weakness, light-headedness, numbness and headaches.        Denies feeling dizzy or room spinning prior to falling.  Denies any increased weakness.  States she is aware she has days of confusion.     Psychiatric/Behavioral:  Positive for confusion. Negative for dysphoric mood. The patient is not nervous/anxious.        Objective   Physical Exam  Vitals and nursing note reviewed.   Constitutional:       General: She is not in acute distress.  HENT:      Head: Normocephalic.      Nose: No congestion or rhinorrhea.      Mouth/Throat:      Mouth: Mucous membranes are moist.   Eyes:      Conjunctiva/sclera: Conjunctivae normal.      Pupils: Pupils are equal, round, and reactive to light.   Cardiovascular:      Rate and Rhythm: Normal rate and regular rhythm.      Pulses: Normal pulses.      Heart sounds: Normal heart sounds. No murmur heard.     No friction rub. No gallop.   Pulmonary:      Effort: Pulmonary effort is normal.      Breath sounds: Normal breath sounds. No wheezing, rhonchi or rales.   Abdominal:      General: Bowel sounds are normal.   Musculoskeletal:      Right lower leg: No edema.      Left lower leg: No edema.   Lymphadenopathy:      Cervical: No cervical adenopathy.   Skin:     General: Skin is warm and dry.   Neurological:      Mental Status: She is  alert.      Comments: Strength of upper and lower extremities 5/5.  States she is aware of falling and has tripped over cart in her room.     Psychiatric:         Mood and Affect: Mood normal.      Comments: MMSE 25/30.  Answers most questions appropriately.  She could not remember the day or date however, she knew the month and season.         There were no vitals taken for this visit.      Current Outpatient Medications:     acetaminophen (Tylenol) 500 mg tablet, Take 1 tablet (500 mg) by mouth every 6 hours if needed., Disp: , Rfl:     aspirin 81 mg chewable tablet, Chew 1 tablet (81 mg)., Disp: , Rfl:     atorvastatin (Lipitor) 20 mg tablet, Take 1 tablet (20 mg) by mouth once daily at bedtime., Disp: , Rfl:     blood sugar diagnostic (Blood Glucose Test) strip, 100 strips 2 times a day., Disp: 100 strip, Rfl: 11    BLOOD SUGAR DIAGNOSTIC MISC, by in vitro route., Disp: , Rfl:     blood sugar diagnostic strip, , Disp: , Rfl:     buPROPion XL (Wellbutrin XL) 300 mg 24 hr tablet, TAKE 1 TABLET BY MOUTH DAILY, Disp: 90 tablet, Rfl: 1    calcium carbonate-vitamin D3 600 mg-10 mcg (400 unit) chewable tablet, Chew 1 tablet once daily., Disp: , Rfl:     cyanocobalamin (Vitamin B-12) 1,000 mcg tablet, TAKE ONE TABLET BY MOUTH DAILY, Disp: 90 tablet, Rfl: 1    desvenlafaxine 100 mg 24 hr tablet, Take 1 tablet (100 mg) by mouth once daily., Disp: 90 tablet, Rfl: 1    esomeprazole (NexIUM) 40 mg DR capsule, Take 1 capsule (40 mg) by mouth once daily in the morning. Take before meals., Disp: 90 capsule, Rfl: 1    fluticasone (Flonase) 50 mcg/actuation nasal spray, Administer 2 sprays into each nostril once daily., Disp: , Rfl:     gabapentin (Neurontin) 300 mg capsule, Take 1 capsule (300 mg) by mouth 3 times a day., Disp: 90 capsule, Rfl: 5    glipiZIDE XL (Glucotrol XL) 10 mg 24 hr tablet, Take 1 tablet (10 mg) by mouth once daily. Do not crush, chew, or split., Disp: 30 tablet, Rfl: 11    hydrOXYzine pamoate (Vistaril)  25 mg capsule, Take 1 capsule (25 mg) by mouth 3 times a day as needed for anxiety., Disp: 90 capsule, Rfl: 0    hydrOXYzine pamoate (Vistaril) 25 mg capsule, Take 1 capsule (25 mg) by mouth 3 times a day as needed for anxiety., Disp: 30 capsule, Rfl: 3    lancets misc, Test twice daily, Disp: 100 each, Rfl: 11    loperamide (Imodium) 2 mg capsule, Take by mouth., Disp: , Rfl:     losartan (Cozaar) 50 mg tablet, Take 1 tablet (50 mg) by mouth once daily., Disp: 30 tablet, Rfl: 11    metoprolol tartrate (Lopressor) 100 mg tablet, Take 1 tablet (100 mg) by mouth 2 times a day., Disp: 60 tablet, Rfl: 5    nitroglycerin (Nitrostat) 0.4 mg SL tablet, Place 1 tablet (0.4 mg) under the tongue every 5 minutes if needed for chest pain., Disp: 30 tablet, Rfl: 1    rosuvastatin (Crestor) 5 mg tablet, TAKE ONE TABLET BY MOUTH DAILY, Disp: 90 tablet, Rfl: 1    traMADol (Ultram) 50 mg tablet, Take 1 tablet (50 mg) by mouth every 6 hours if needed for moderate pain (4 - 6)., Disp: 10 tablet, Rfl: 0    traZODone (Desyrel) 50 mg tablet, TAKE ONE TABLET BY MOUTH EVERY NIGHT AT BEDTIME, Disp: 90 tablet, Rfl: 1   Past Medical History:   Diagnosis Date    Hyperlipidemia, unspecified 07/14/2022    Hyperlipidemia LDL goal <70    Hypertension     MI (myocardial infarction) (Multi) 2014      Past Surgical History:   Procedure Laterality Date    OTHER SURGICAL HISTORY  06/02/2022    Knee surgery    OTHER SURGICAL HISTORY  06/02/2022    Mastectomy    OTHER SURGICAL HISTORY  06/02/2022    Knee replacement    OTHER SURGICAL HISTORY  06/02/2022    Carpal tunnel surgery        Family History   Problem Relation Name Age of Onset    No Known Problems Mother      No Known Problems Father          Assessment/Plan   Problem List Items Addressed This Visit       Frequent falls - Primary    Confusion   Will obtain labs and get CT brain without contrast.  Continue to monitor.

## 2025-04-23 ENCOUNTER — NURSING HOME VISIT (OUTPATIENT)
Dept: POST ACUTE CARE | Facility: EXTERNAL LOCATION | Age: 70
End: 2025-04-23
Payer: MEDICARE

## 2025-04-23 DIAGNOSIS — R60.0 BILATERAL LOWER EXTREMITY EDEMA: Primary | ICD-10-CM

## 2025-04-23 PROCEDURE — 99308 SBSQ NF CARE LOW MDM 20: CPT | Performed by: INTERNAL MEDICINE

## 2025-04-23 NOTE — PROGRESS NOTES
Pt was seen in the NH.  Pt c/o lower ext edema  General appearance: Comfortable, no distress  ROS: No SOB  Medications reviewed  Head: Normal  Neck: Soft  Heart: Regular  Lungs: Clear  Abdomen: soft  Ext moderate LE edema    Plan:     Increase lasix 20 mg bid, BMP 1 week    Problem List Items Addressed This Visit       Bilateral lower extremity edema - Primary

## 2025-04-23 NOTE — LETTER
Patient: Silva Corrigan  : 1955    Encounter Date: 2025    Pt was seen in the NH.  Pt c/o lower ext edema  General appearance: Comfortable, no distress  ROS: No SOB  Medications reviewed  Head: Normal  Neck: Soft  Heart: Regular  Lungs: Clear  Abdomen: soft  Ext moderate LE edema    Plan:     Increase lasix 20 mg bid, BMP 1 week    Problem List Items Addressed This Visit       Bilateral lower extremity edema - Primary          Electronically Signed By: Chevy Ramachandran MD   25  5:28 PM

## 2025-05-06 ENCOUNTER — NURSING HOME VISIT (OUTPATIENT)
Dept: POST ACUTE CARE | Facility: EXTERNAL LOCATION | Age: 70
End: 2025-05-06
Payer: MEDICAID

## 2025-05-06 DIAGNOSIS — R41.0 CONFUSION: Primary | ICD-10-CM

## 2025-05-06 PROCEDURE — 99309 SBSQ NF CARE MODERATE MDM 30: CPT | Performed by: NURSE PRACTITIONER

## 2025-05-06 ASSESSMENT — ENCOUNTER SYMPTOMS
HEADACHES: 0
FATIGUE: 0
FREQUENCY: 0
CONFUSION: 1
HEMATURIA: 0
WHEEZING: 0
BACK PAIN: 1
FLANK PAIN: 0
LIGHT-HEADEDNESS: 0
COUGH: 0
VOMITING: 0
SHORTNESS OF BREATH: 0
DIARRHEA: 0
CONSTIPATION: 0
APPETITE CHANGE: 0
NAUSEA: 0
PALPITATIONS: 0

## 2025-05-06 NOTE — PROGRESS NOTES
Subjective   Patient ID: Silva Corrigan is a 69 y.o. female who presents for No chief complaint on file..  68 yo female resident at [facility] with history of dementia.  Staff reports resident has become more confused since her last fall.  She has also had some episodes of agitation.  CT of brain on 3-5-25 showed no hemorrhages, masses or fractures of the skull.  Resident in room getting ready to eat breakfast.  Resident states she would just like to return to normal.  States her legs are weak since her last fall.  She has worked with PT without much improvement.          Review of Systems   Constitutional:  Negative for appetite change and fatigue.   HENT: Negative.     Respiratory:  Negative for cough, shortness of breath and wheezing.    Cardiovascular:  Negative for chest pain, palpitations and leg swelling.   Gastrointestinal:  Negative for constipation, diarrhea, nausea and vomiting.   Genitourinary:  Negative for flank pain, frequency and hematuria.   Musculoskeletal:  Positive for back pain and gait problem.   Neurological:  Negative for syncope, light-headedness and headaches.   Psychiatric/Behavioral:  Positive for confusion.        Objective   Physical Exam  Vitals and nursing note reviewed.   Constitutional:       General: She is not in acute distress.     Appearance: She is not ill-appearing.   HENT:      Head: Normocephalic.   Cardiovascular:      Rate and Rhythm: Normal rate and regular rhythm.      Pulses: Normal pulses.      Heart sounds: Normal heart sounds. No murmur heard.     No friction rub. No gallop.   Pulmonary:      Effort: Pulmonary effort is normal.      Breath sounds: Normal breath sounds. No wheezing, rhonchi or rales.   Abdominal:      General: Bowel sounds are normal.   Musculoskeletal:      Right lower leg: Edema present.      Left lower leg: Edema present.      Comments: Trace edema bilateral lower extremities.    Skin:     General: Skin is warm and dry.      Capillary Refill: Capillary  refill takes 2 to 3 seconds.   Neurological:      Mental Status: She is alert.      Comments: She is oriented person and place.  Strength equal 4/5 upper and lower.     Psychiatric:         Mood and Affect: Mood normal.       There were no vitals taken for this visit.    Current Medications[1]   Medical History[2]   Surgical History[3]     Family History[4]     Assessment/Plan   Problem List Items Addressed This Visit       Confusion - Primary   Most recent labs reviewed.  CT reviewed.  She is polypharmacy.  Will decrease her Gabapentin to 400mg TID.   Refer to psych to eval multiple psych meds.  Continue to monitor.           [1]   Current Outpatient Medications:     acetaminophen (Tylenol) 500 mg tablet, Take 1 tablet (500 mg) by mouth every 6 hours if needed., Disp: , Rfl:     aspirin 81 mg chewable tablet, Chew 1 tablet (81 mg)., Disp: , Rfl:     atorvastatin (Lipitor) 20 mg tablet, Take 1 tablet (20 mg) by mouth once daily at bedtime., Disp: , Rfl:     blood sugar diagnostic (Blood Glucose Test) strip, 100 strips 2 times a day., Disp: 100 strip, Rfl: 11    BLOOD SUGAR DIAGNOSTIC MISC, by in vitro route., Disp: , Rfl:     blood sugar diagnostic strip, , Disp: , Rfl:     buPROPion XL (Wellbutrin XL) 300 mg 24 hr tablet, TAKE 1 TABLET BY MOUTH DAILY, Disp: 90 tablet, Rfl: 1    calcium carbonate-vitamin D3 600 mg-10 mcg (400 unit) chewable tablet, Chew 1 tablet once daily., Disp: , Rfl:     cyanocobalamin (Vitamin B-12) 1,000 mcg tablet, TAKE ONE TABLET BY MOUTH DAILY, Disp: 90 tablet, Rfl: 1    desvenlafaxine 100 mg 24 hr tablet, Take 1 tablet (100 mg) by mouth once daily., Disp: 90 tablet, Rfl: 1    esomeprazole (NexIUM) 40 mg DR capsule, Take 1 capsule (40 mg) by mouth once daily in the morning. Take before meals., Disp: 90 capsule, Rfl: 1    fluticasone (Flonase) 50 mcg/actuation nasal spray, Administer 2 sprays into each nostril once daily., Disp: , Rfl:     gabapentin (Neurontin) 300 mg capsule, Take 1  capsule (300 mg) by mouth 3 times a day., Disp: 90 capsule, Rfl: 5    glipiZIDE XL (Glucotrol XL) 10 mg 24 hr tablet, Take 1 tablet (10 mg) by mouth once daily. Do not crush, chew, or split., Disp: 30 tablet, Rfl: 11    hydrOXYzine pamoate (Vistaril) 25 mg capsule, Take 1 capsule (25 mg) by mouth 3 times a day as needed for anxiety., Disp: 90 capsule, Rfl: 0    hydrOXYzine pamoate (Vistaril) 25 mg capsule, Take 1 capsule (25 mg) by mouth 3 times a day as needed for anxiety., Disp: 30 capsule, Rfl: 3    lancets misc, Test twice daily, Disp: 100 each, Rfl: 11    loperamide (Imodium) 2 mg capsule, Take by mouth., Disp: , Rfl:     losartan (Cozaar) 50 mg tablet, Take 1 tablet (50 mg) by mouth once daily., Disp: 30 tablet, Rfl: 11    metoprolol tartrate (Lopressor) 100 mg tablet, Take 1 tablet (100 mg) by mouth 2 times a day., Disp: 60 tablet, Rfl: 5    nitroglycerin (Nitrostat) 0.4 mg SL tablet, Place 1 tablet (0.4 mg) under the tongue every 5 minutes if needed for chest pain., Disp: 30 tablet, Rfl: 1    rosuvastatin (Crestor) 5 mg tablet, TAKE ONE TABLET BY MOUTH DAILY, Disp: 90 tablet, Rfl: 1    traMADol (Ultram) 50 mg tablet, Take 1 tablet (50 mg) by mouth every 6 hours if needed for moderate pain (4 - 6)., Disp: 10 tablet, Rfl: 0    traZODone (Desyrel) 50 mg tablet, TAKE ONE TABLET BY MOUTH EVERY NIGHT AT BEDTIME, Disp: 90 tablet, Rfl: 1  [2]   Past Medical History:  Diagnosis Date    Hyperlipidemia, unspecified 07/14/2022    Hyperlipidemia LDL goal <70    Hypertension     MI (myocardial infarction) (Multi) 2014   [3]   Past Surgical History:  Procedure Laterality Date    OTHER SURGICAL HISTORY  06/02/2022    Knee surgery    OTHER SURGICAL HISTORY  06/02/2022    Mastectomy    OTHER SURGICAL HISTORY  06/02/2022    Knee replacement    OTHER SURGICAL HISTORY  06/02/2022    Carpal tunnel surgery   [4]   Family History  Problem Relation Name Age of Onset    No Known Problems Mother      No Known Problems Father

## 2025-05-06 NOTE — LETTER
Patient: Silva Corrigan  : 1955    Encounter Date: 2025    Subjective  Patient ID: Silva Corrigan is a 69 y.o. female who presents for No chief complaint on file..  70 yo female resident at [facility] with history of dementia.  Staff reports resident has become more confused since her last fall.  She has also had some episodes of agitation.  CT of brain on 3-5-25 showed no hemorrhages, masses or fractures of the skull.  Resident in room getting ready to eat breakfast.  Resident states she would just like to return to normal.  States her legs are weak since her last fall.  She has worked with PT without much improvement.          Review of Systems   Constitutional:  Negative for appetite change and fatigue.   HENT: Negative.     Respiratory:  Negative for cough, shortness of breath and wheezing.    Cardiovascular:  Negative for chest pain, palpitations and leg swelling.   Gastrointestinal:  Negative for constipation, diarrhea, nausea and vomiting.   Genitourinary:  Negative for flank pain, frequency and hematuria.   Musculoskeletal:  Positive for back pain and gait problem.   Neurological:  Negative for syncope, light-headedness and headaches.   Psychiatric/Behavioral:  Positive for confusion.        Objective  Physical Exam  Vitals and nursing note reviewed.   Constitutional:       General: She is not in acute distress.     Appearance: She is not ill-appearing.   HENT:      Head: Normocephalic.   Cardiovascular:      Rate and Rhythm: Normal rate and regular rhythm.      Pulses: Normal pulses.      Heart sounds: Normal heart sounds. No murmur heard.     No friction rub. No gallop.   Pulmonary:      Effort: Pulmonary effort is normal.      Breath sounds: Normal breath sounds. No wheezing, rhonchi or rales.   Abdominal:      General: Bowel sounds are normal.   Musculoskeletal:      Right lower leg: Edema present.      Left lower leg: Edema present.      Comments: Trace edema bilateral lower extremities.    Skin:      General: Skin is warm and dry.      Capillary Refill: Capillary refill takes 2 to 3 seconds.   Neurological:      Mental Status: She is alert.      Comments: She is oriented person and place.  Strength equal 4/5 upper and lower.     Psychiatric:         Mood and Affect: Mood normal.       There were no vitals taken for this visit.    Current Medications[1]   Medical History[2]   Surgical History[3]     Family History[4]     Assessment/Plan  Problem List Items Addressed This Visit       Confusion - Primary   Most recent labs reviewed.  CT reviewed.  She is polypharmacy.  Will decrease her Gabapentin to 400mg TID.   Refer to psych to eval multiple psych meds.  Continue to monitor.          Electronically Signed By: Almaz Patel, LISA-CNP   5/6/25  5:50 PM       [1]    Current Outpatient Medications:   •  acetaminophen (Tylenol) 500 mg tablet, Take 1 tablet (500 mg) by mouth every 6 hours if needed., Disp: , Rfl:   •  aspirin 81 mg chewable tablet, Chew 1 tablet (81 mg)., Disp: , Rfl:   •  atorvastatin (Lipitor) 20 mg tablet, Take 1 tablet (20 mg) by mouth once daily at bedtime., Disp: , Rfl:   •  blood sugar diagnostic (Blood Glucose Test) strip, 100 strips 2 times a day., Disp: 100 strip, Rfl: 11  •  BLOOD SUGAR DIAGNOSTIC MISC, by in vitro route., Disp: , Rfl:   •  blood sugar diagnostic strip, , Disp: , Rfl:   •  buPROPion XL (Wellbutrin XL) 300 mg 24 hr tablet, TAKE 1 TABLET BY MOUTH DAILY, Disp: 90 tablet, Rfl: 1  •  calcium carbonate-vitamin D3 600 mg-10 mcg (400 unit) chewable tablet, Chew 1 tablet once daily., Disp: , Rfl:   •  cyanocobalamin (Vitamin B-12) 1,000 mcg tablet, TAKE ONE TABLET BY MOUTH DAILY, Disp: 90 tablet, Rfl: 1  •  desvenlafaxine 100 mg 24 hr tablet, Take 1 tablet (100 mg) by mouth once daily., Disp: 90 tablet, Rfl: 1  •  esomeprazole (NexIUM) 40 mg DR capsule, Take 1 capsule (40 mg) by mouth once daily in the morning. Take before meals., Disp: 90 capsule, Rfl: 1  •  fluticasone (Flonase)  50 mcg/actuation nasal spray, Administer 2 sprays into each nostril once daily., Disp: , Rfl:   •  gabapentin (Neurontin) 300 mg capsule, Take 1 capsule (300 mg) by mouth 3 times a day., Disp: 90 capsule, Rfl: 5  •  glipiZIDE XL (Glucotrol XL) 10 mg 24 hr tablet, Take 1 tablet (10 mg) by mouth once daily. Do not crush, chew, or split., Disp: 30 tablet, Rfl: 11  •  hydrOXYzine pamoate (Vistaril) 25 mg capsule, Take 1 capsule (25 mg) by mouth 3 times a day as needed for anxiety., Disp: 90 capsule, Rfl: 0  •  hydrOXYzine pamoate (Vistaril) 25 mg capsule, Take 1 capsule (25 mg) by mouth 3 times a day as needed for anxiety., Disp: 30 capsule, Rfl: 3  •  lancets misc, Test twice daily, Disp: 100 each, Rfl: 11  •  loperamide (Imodium) 2 mg capsule, Take by mouth., Disp: , Rfl:   •  losartan (Cozaar) 50 mg tablet, Take 1 tablet (50 mg) by mouth once daily., Disp: 30 tablet, Rfl: 11  •  metoprolol tartrate (Lopressor) 100 mg tablet, Take 1 tablet (100 mg) by mouth 2 times a day., Disp: 60 tablet, Rfl: 5  •  nitroglycerin (Nitrostat) 0.4 mg SL tablet, Place 1 tablet (0.4 mg) under the tongue every 5 minutes if needed for chest pain., Disp: 30 tablet, Rfl: 1  •  rosuvastatin (Crestor) 5 mg tablet, TAKE ONE TABLET BY MOUTH DAILY, Disp: 90 tablet, Rfl: 1  •  traMADol (Ultram) 50 mg tablet, Take 1 tablet (50 mg) by mouth every 6 hours if needed for moderate pain (4 - 6)., Disp: 10 tablet, Rfl: 0  •  traZODone (Desyrel) 50 mg tablet, TAKE ONE TABLET BY MOUTH EVERY NIGHT AT BEDTIME, Disp: 90 tablet, Rfl: 1  [2]  Past Medical History:  Diagnosis Date   • Hyperlipidemia, unspecified 07/14/2022    Hyperlipidemia LDL goal <70   • Hypertension    • MI (myocardial infarction) (Multi) 2014   [3]  Past Surgical History:  Procedure Laterality Date   • OTHER SURGICAL HISTORY  06/02/2022    Knee surgery   • OTHER SURGICAL HISTORY  06/02/2022    Mastectomy   • OTHER SURGICAL HISTORY  06/02/2022    Knee replacement   • OTHER SURGICAL HISTORY   06/02/2022    Carpal tunnel surgery   [4]  Family History  Problem Relation Name Age of Onset   • No Known Problems Mother     • No Known Problems Father

## 2025-05-22 DIAGNOSIS — F01.B0 MODERATE VASCULAR DEMENTIA WITHOUT BEHAVIORAL DISTURBANCE, PSYCHOTIC DISTURBANCE, MOOD DISTURBANCE, OR ANXIETY: Primary | ICD-10-CM

## 2025-05-22 DIAGNOSIS — R29.6 FREQUENT FALLS: ICD-10-CM

## 2025-05-22 NOTE — PROGRESS NOTES
Pt was seen in the NH.  Pt is in usual state , has poor balance, getting PT  General appearance: Comfortable, no distress  ROS: No SOB  Medications reviewed  Head: Normal  Neck: Soft  Heart: Regular  Lungs: Clear  Abdomen: soft    Plan:   1)dc lorazepam, decrease Risperdal 0.5 mg daily, MRI brain  2) To continue Namenda 10 mg bid    Problem List Items Addressed This Visit       Vascular dementia - Primary    Frequent falls

## 2025-06-09 ENCOUNTER — NURSING HOME VISIT (OUTPATIENT)
Dept: POST ACUTE CARE | Facility: EXTERNAL LOCATION | Age: 70
End: 2025-06-09
Payer: COMMERCIAL

## 2025-06-09 DIAGNOSIS — M25.561 CHRONIC PAIN OF BOTH KNEES: Primary | ICD-10-CM

## 2025-06-09 DIAGNOSIS — G89.29 CHRONIC PAIN OF BOTH KNEES: Primary | ICD-10-CM

## 2025-06-09 DIAGNOSIS — M25.562 CHRONIC PAIN OF BOTH KNEES: Primary | ICD-10-CM

## 2025-06-09 PROCEDURE — 99308 SBSQ NF CARE LOW MDM 20: CPT | Performed by: NURSE PRACTITIONER

## 2025-06-09 ASSESSMENT — ENCOUNTER SYMPTOMS
VOMITING: 0
COUGH: 0
CHILLS: 0
JOINT SWELLING: 0
CONSTIPATION: 0
DIARRHEA: 0
SHORTNESS OF BREATH: 0
ABDOMINAL DISTENTION: 0
PALPITATIONS: 0
FEVER: 0
NAUSEA: 0
ARTHRALGIAS: 1
WHEEZING: 0
ABDOMINAL PAIN: 0

## 2025-06-09 NOTE — PROGRESS NOTES
Subjective   Patient ID: Silva Corrigan is a 69 y.o. female who presents for No chief complaint on file..  68 yo female resident at [facility] with history of dementia, GERD, htn.  Resident reports she is having knee pain bilaterally.  States the pain is the same both knees with aching.  States her left knee is worse when standing up.  States she does not get up without assistance.  Feels PT has not helped.  Reports this is a chronic issue as she had knee pain when she was young.  Family would like resident off of Gabapentin.          Review of Systems   Constitutional:  Negative for chills and fever.   Respiratory:  Negative for cough, shortness of breath and wheezing.    Cardiovascular:  Negative for chest pain and palpitations.   Gastrointestinal:  Negative for abdominal distention, abdominal pain, constipation, diarrhea, nausea and vomiting.   Musculoskeletal:  Positive for arthralgias. Negative for joint swelling.       Objective   Physical Exam  Vitals and nursing note reviewed. Exam conducted with a chaperone present.   Constitutional:       General: She is not in acute distress.  HENT:      Head: Normocephalic.   Cardiovascular:      Rate and Rhythm: Normal rate and regular rhythm.      Pulses: Normal pulses.      Heart sounds: Normal heart sounds. No murmur heard.     No friction rub. No gallop.   Pulmonary:      Effort: Pulmonary effort is normal.      Breath sounds: Normal breath sounds. No wheezing, rhonchi or rales.   Abdominal:      General: Bowel sounds are normal.   Musculoskeletal:      Right lower leg: No edema.      Left lower leg: No edema.      Comments: Right knee appears larger than left.  Crepitus noted in right knee on ROM.  No crepitus noted in left knee.     Skin:     General: Skin is warm and dry.   Neurological:      Mental Status: She is alert.   Psychiatric:         Mood and Affect: Mood normal.       There were no vitals taken for this visit.    Current Medications[1]   Medical History[2]    Surgical History[3]     Family History[4]     Assessment/Plan   Problem List Items Addressed This Visit       Chronic pain of both knees - Primary   Will decrease her Gabapentin to 300mg daily.  Continue Norco 5/325mg q12hrs and start Lidocaine pad 4% applied to both knees daily and remove after 12 hours.  If no improvement, may need cortisone injections (pt states they did not help year ago).   Cont to monitor.           [1]   Current Outpatient Medications:     acetaminophen (Tylenol) 500 mg tablet, Take 1 tablet (500 mg) by mouth every 6 hours if needed., Disp: , Rfl:     aspirin 81 mg chewable tablet, Chew 1 tablet (81 mg)., Disp: , Rfl:     atorvastatin (Lipitor) 20 mg tablet, Take 1 tablet (20 mg) by mouth once daily at bedtime., Disp: , Rfl:     blood sugar diagnostic (Blood Glucose Test) strip, 100 strips 2 times a day., Disp: 100 strip, Rfl: 11    BLOOD SUGAR DIAGNOSTIC MISC, by in vitro route., Disp: , Rfl:     blood sugar diagnostic strip, , Disp: , Rfl:     buPROPion XL (Wellbutrin XL) 300 mg 24 hr tablet, TAKE 1 TABLET BY MOUTH DAILY, Disp: 90 tablet, Rfl: 1    calcium carbonate-vitamin D3 600 mg-10 mcg (400 unit) chewable tablet, Chew 1 tablet once daily., Disp: , Rfl:     cyanocobalamin (Vitamin B-12) 1,000 mcg tablet, TAKE ONE TABLET BY MOUTH DAILY, Disp: 90 tablet, Rfl: 1    desvenlafaxine 100 mg 24 hr tablet, Take 1 tablet (100 mg) by mouth once daily., Disp: 90 tablet, Rfl: 1    esomeprazole (NexIUM) 40 mg DR capsule, Take 1 capsule (40 mg) by mouth once daily in the morning. Take before meals., Disp: 90 capsule, Rfl: 1    fluticasone (Flonase) 50 mcg/actuation nasal spray, Administer 2 sprays into each nostril once daily., Disp: , Rfl:     gabapentin (Neurontin) 300 mg capsule, Take 1 capsule (300 mg) by mouth 3 times a day., Disp: 90 capsule, Rfl: 5    glipiZIDE XL (Glucotrol XL) 10 mg 24 hr tablet, Take 1 tablet (10 mg) by mouth once daily. Do not crush, chew, or split., Disp: 30 tablet, Rfl:  11    hydrOXYzine pamoate (Vistaril) 25 mg capsule, Take 1 capsule (25 mg) by mouth 3 times a day as needed for anxiety., Disp: 90 capsule, Rfl: 0    hydrOXYzine pamoate (Vistaril) 25 mg capsule, Take 1 capsule (25 mg) by mouth 3 times a day as needed for anxiety., Disp: 30 capsule, Rfl: 3    lancets misc, Test twice daily, Disp: 100 each, Rfl: 11    loperamide (Imodium) 2 mg capsule, Take by mouth., Disp: , Rfl:     losartan (Cozaar) 50 mg tablet, Take 1 tablet (50 mg) by mouth once daily., Disp: 30 tablet, Rfl: 11    metoprolol tartrate (Lopressor) 100 mg tablet, Take 1 tablet (100 mg) by mouth 2 times a day., Disp: 60 tablet, Rfl: 5    nitroglycerin (Nitrostat) 0.4 mg SL tablet, Place 1 tablet (0.4 mg) under the tongue every 5 minutes if needed for chest pain., Disp: 30 tablet, Rfl: 1    rosuvastatin (Crestor) 5 mg tablet, TAKE ONE TABLET BY MOUTH DAILY, Disp: 90 tablet, Rfl: 1    traMADol (Ultram) 50 mg tablet, Take 1 tablet (50 mg) by mouth every 6 hours if needed for moderate pain (4 - 6)., Disp: 10 tablet, Rfl: 0    traZODone (Desyrel) 50 mg tablet, TAKE ONE TABLET BY MOUTH EVERY NIGHT AT BEDTIME, Disp: 90 tablet, Rfl: 1  [2]   Past Medical History:  Diagnosis Date    Hyperlipidemia, unspecified 07/14/2022    Hyperlipidemia LDL goal <70    Hypertension     MI (myocardial infarction) (Multi) 2014   [3]   Past Surgical History:  Procedure Laterality Date    OTHER SURGICAL HISTORY  06/02/2022    Knee surgery    OTHER SURGICAL HISTORY  06/02/2022    Mastectomy    OTHER SURGICAL HISTORY  06/02/2022    Knee replacement    OTHER SURGICAL HISTORY  06/02/2022    Carpal tunnel surgery   [4]   Family History  Problem Relation Name Age of Onset    No Known Problems Mother      No Known Problems Father

## 2025-06-09 NOTE — LETTER
Patient: Silva Corrigan  : 1955    Encounter Date: 2025    Subjective  Patient ID: Silva Corrigan is a 69 y.o. female who presents for No chief complaint on file..  68 yo female resident at [facility] with history of dementia, GERD, htn.  Resident reports she is having knee pain bilaterally.  States the pain is the same both knees with aching.  States her left knee is worse when standing up.  States she does not get up without assistance.  Feels PT has not helped.  Reports this is a chronic issue as she had knee pain when she was young.  Family would like resident off of Gabapentin.          Review of Systems   Constitutional:  Negative for chills and fever.   Respiratory:  Negative for cough, shortness of breath and wheezing.    Cardiovascular:  Negative for chest pain and palpitations.   Gastrointestinal:  Negative for abdominal distention, abdominal pain, constipation, diarrhea, nausea and vomiting.   Musculoskeletal:  Positive for arthralgias. Negative for joint swelling.       Objective  Physical Exam  Vitals and nursing note reviewed. Exam conducted with a chaperone present.   Constitutional:       General: She is not in acute distress.  HENT:      Head: Normocephalic.   Cardiovascular:      Rate and Rhythm: Normal rate and regular rhythm.      Pulses: Normal pulses.      Heart sounds: Normal heart sounds. No murmur heard.     No friction rub. No gallop.   Pulmonary:      Effort: Pulmonary effort is normal.      Breath sounds: Normal breath sounds. No wheezing, rhonchi or rales.   Abdominal:      General: Bowel sounds are normal.   Musculoskeletal:      Right lower leg: No edema.      Left lower leg: No edema.      Comments: Right knee appears larger than left.  Crepitus noted in right knee on ROM.  No crepitus noted in left knee.     Skin:     General: Skin is warm and dry.   Neurological:      Mental Status: She is alert.   Psychiatric:         Mood and Affect: Mood normal.       There were no vitals taken  for this visit.    Current Medications[1]   Medical History[2]   Surgical History[3]     Family History[4]     Assessment/Plan  Problem List Items Addressed This Visit       Chronic pain of both knees - Primary   Will decrease her Gabapentin to 300mg daily.  Continue Norco 5/325mg q12hrs and start Lidocaine pad 4% applied to both knees daily and remove after 12 hours.  If no improvement, may need cortisone injections (pt states they did not help year ago).   Cont to monitor.          Electronically Signed By: ROWENA Barahona   6/9/25  7:31 PM       [1]    Current Outpatient Medications:   •  acetaminophen (Tylenol) 500 mg tablet, Take 1 tablet (500 mg) by mouth every 6 hours if needed., Disp: , Rfl:   •  aspirin 81 mg chewable tablet, Chew 1 tablet (81 mg)., Disp: , Rfl:   •  atorvastatin (Lipitor) 20 mg tablet, Take 1 tablet (20 mg) by mouth once daily at bedtime., Disp: , Rfl:   •  blood sugar diagnostic (Blood Glucose Test) strip, 100 strips 2 times a day., Disp: 100 strip, Rfl: 11  •  BLOOD SUGAR DIAGNOSTIC MISC, by in vitro route., Disp: , Rfl:   •  blood sugar diagnostic strip, , Disp: , Rfl:   •  buPROPion XL (Wellbutrin XL) 300 mg 24 hr tablet, TAKE 1 TABLET BY MOUTH DAILY, Disp: 90 tablet, Rfl: 1  •  calcium carbonate-vitamin D3 600 mg-10 mcg (400 unit) chewable tablet, Chew 1 tablet once daily., Disp: , Rfl:   •  cyanocobalamin (Vitamin B-12) 1,000 mcg tablet, TAKE ONE TABLET BY MOUTH DAILY, Disp: 90 tablet, Rfl: 1  •  desvenlafaxine 100 mg 24 hr tablet, Take 1 tablet (100 mg) by mouth once daily., Disp: 90 tablet, Rfl: 1  •  esomeprazole (NexIUM) 40 mg DR capsule, Take 1 capsule (40 mg) by mouth once daily in the morning. Take before meals., Disp: 90 capsule, Rfl: 1  •  fluticasone (Flonase) 50 mcg/actuation nasal spray, Administer 2 sprays into each nostril once daily., Disp: , Rfl:   •  gabapentin (Neurontin) 300 mg capsule, Take 1 capsule (300 mg) by mouth 3 times a day., Disp: 90 capsule,  Rfl: 5  •  glipiZIDE XL (Glucotrol XL) 10 mg 24 hr tablet, Take 1 tablet (10 mg) by mouth once daily. Do not crush, chew, or split., Disp: 30 tablet, Rfl: 11  •  hydrOXYzine pamoate (Vistaril) 25 mg capsule, Take 1 capsule (25 mg) by mouth 3 times a day as needed for anxiety., Disp: 90 capsule, Rfl: 0  •  hydrOXYzine pamoate (Vistaril) 25 mg capsule, Take 1 capsule (25 mg) by mouth 3 times a day as needed for anxiety., Disp: 30 capsule, Rfl: 3  •  lancets misc, Test twice daily, Disp: 100 each, Rfl: 11  •  loperamide (Imodium) 2 mg capsule, Take by mouth., Disp: , Rfl:   •  losartan (Cozaar) 50 mg tablet, Take 1 tablet (50 mg) by mouth once daily., Disp: 30 tablet, Rfl: 11  •  metoprolol tartrate (Lopressor) 100 mg tablet, Take 1 tablet (100 mg) by mouth 2 times a day., Disp: 60 tablet, Rfl: 5  •  nitroglycerin (Nitrostat) 0.4 mg SL tablet, Place 1 tablet (0.4 mg) under the tongue every 5 minutes if needed for chest pain., Disp: 30 tablet, Rfl: 1  •  rosuvastatin (Crestor) 5 mg tablet, TAKE ONE TABLET BY MOUTH DAILY, Disp: 90 tablet, Rfl: 1  •  traMADol (Ultram) 50 mg tablet, Take 1 tablet (50 mg) by mouth every 6 hours if needed for moderate pain (4 - 6)., Disp: 10 tablet, Rfl: 0  •  traZODone (Desyrel) 50 mg tablet, TAKE ONE TABLET BY MOUTH EVERY NIGHT AT BEDTIME, Disp: 90 tablet, Rfl: 1  [2]  Past Medical History:  Diagnosis Date   • Hyperlipidemia, unspecified 07/14/2022    Hyperlipidemia LDL goal <70   • Hypertension    • MI (myocardial infarction) (Multi) 2014   [3]  Past Surgical History:  Procedure Laterality Date   • OTHER SURGICAL HISTORY  06/02/2022    Knee surgery   • OTHER SURGICAL HISTORY  06/02/2022    Mastectomy   • OTHER SURGICAL HISTORY  06/02/2022    Knee replacement   • OTHER SURGICAL HISTORY  06/02/2022    Carpal tunnel surgery   [4]  Family History  Problem Relation Name Age of Onset   • No Known Problems Mother     • No Known Problems Father

## 2025-06-13 ENCOUNTER — HOSPITAL ENCOUNTER (OUTPATIENT)
Dept: RADIOLOGY | Facility: HOSPITAL | Age: 70
Discharge: HOME | End: 2025-06-13
Payer: COMMERCIAL

## 2025-06-13 DIAGNOSIS — R26.81 UNSTEADINESS ON FEET: ICD-10-CM

## 2025-06-13 DIAGNOSIS — F01.B2: ICD-10-CM

## 2025-06-13 PROCEDURE — 70553 MRI BRAIN STEM W/O & W/DYE: CPT

## 2025-06-13 PROCEDURE — A9575 INJ GADOTERATE MEGLUMI 0.1ML: HCPCS | Performed by: NURSE PRACTITIONER

## 2025-06-13 PROCEDURE — 2550000001 HC RX 255 CONTRASTS: Performed by: NURSE PRACTITIONER

## 2025-06-13 RX ORDER — GADOTERATE MEGLUMINE 376.9 MG/ML
18 INJECTION INTRAVENOUS
Status: COMPLETED | OUTPATIENT
Start: 2025-06-13 | End: 2025-06-13

## 2025-06-13 RX ADMIN — GADOTERATE MEGLUMINE 18 ML: 376.9 INJECTION INTRAVENOUS at 10:53

## 2025-06-17 ENCOUNTER — NURSING HOME VISIT (OUTPATIENT)
Dept: POST ACUTE CARE | Facility: EXTERNAL LOCATION | Age: 70
End: 2025-06-17
Payer: COMMERCIAL

## 2025-06-17 DIAGNOSIS — M15.9 GENERALIZED OSTEOARTHRITIS: Primary | ICD-10-CM

## 2025-06-17 PROCEDURE — 99308 SBSQ NF CARE LOW MDM 20: CPT | Performed by: INTERNAL MEDICINE

## 2025-06-17 NOTE — LETTER
Patient: Silva Corrigan  : 1955    Encounter Date: 2025    Pt was seen in the NH.  Pt co joint pain all over, recently her gabapentin has been decreased  General appearance: Comfortable, no distress  ROS: No SOB  Medications reviewed  Head: Normal  Neck: Soft  Heart: Regular  Lungs: Clear  Abdomen: soft    Plan:     To increase Gabapentin 300 mg bid    Problem List Items Addressed This Visit    None  Visit Diagnoses         Generalized osteoarthritis    -  Primary               Electronically Signed By: Chevy Ramachandran MD   25  4:08 PM

## 2025-06-17 NOTE — PROGRESS NOTES
Pt was seen in the NH.  Pt co joint pain all over, recently her gabapentin has been decreased  General appearance: Comfortable, no distress  ROS: No SOB  Medications reviewed  Head: Normal  Neck: Soft  Heart: Regular  Lungs: Clear  Abdomen: soft    Plan:     To increase Gabapentin 300 mg bid    Problem List Items Addressed This Visit    None  Visit Diagnoses         Generalized osteoarthritis    -  Primary

## 2025-06-30 ENCOUNTER — NURSING HOME VISIT (OUTPATIENT)
Dept: POST ACUTE CARE | Facility: EXTERNAL LOCATION | Age: 70
End: 2025-06-30
Payer: COMMERCIAL

## 2025-06-30 DIAGNOSIS — K13.79 MOUTH PAIN: Primary | ICD-10-CM

## 2025-06-30 PROCEDURE — 99308 SBSQ NF CARE LOW MDM 20: CPT | Performed by: NURSE PRACTITIONER

## 2025-06-30 ASSESSMENT — ENCOUNTER SYMPTOMS
FEVER: 0
RESPIRATORY NEGATIVE: 1
GASTROINTESTINAL NEGATIVE: 1
CARDIOVASCULAR NEGATIVE: 1
ARTHRALGIAS: 1

## 2025-06-30 NOTE — LETTER
Patient: Silva Corrigan  : 1955    Encounter Date: 2025    Subjective  Patient ID: Silva Corrigan is a 69 y.o. female who presents for No chief complaint on file..  70 yo female resident at [facility] with dementia and OA.  Resident recently had 5 teeth pulled, approximately 3 days ago.  She is currently eating soft foods.  Staff reports resident has been complaining of pain lately.  Resident sitting in room.  States most of her pain is coming from her mouth.  She realizes it will take several days for the pain to be relieved and she is taking Norco scheduled which she admits that helps with her pain.  Also reports her recliner chair is very uncomfortable but, her family paid a lot of money for the chair so she uses it.          Review of Systems   Constitutional:  Negative for fever.   Respiratory: Negative.     Cardiovascular: Negative.    Gastrointestinal: Negative.    Musculoskeletal:  Positive for arthralgias.       Objective  Physical Exam  Vitals and nursing note reviewed.   Constitutional:       General: She is not in acute distress.  HENT:      Head: Normocephalic.   Cardiovascular:      Rate and Rhythm: Normal rate and regular rhythm.      Heart sounds: Normal heart sounds.   Pulmonary:      Breath sounds: Normal breath sounds. No wheezing, rhonchi or rales.   Musculoskeletal:      Right lower leg: No edema.      Left lower leg: No edema.   Skin:     General: Skin is warm and dry.   Neurological:      Mental Status: She is alert.   Psychiatric:         Mood and Affect: Mood normal.       There were no vitals taken for this visit.    Current Medications[1]   Medical History[2]   Surgical History[3]     Family History[4]     Assessment/Plan  Problem List Items Addressed This Visit       Mouth pain - Primary   Resident is currently taking Norco bid and she feels that is working for her pain.  Cont to monitor.          Electronically Signed By: ROWENA Barahona   25  2:24 PM       [1]    Current  Outpatient Medications:   •  acetaminophen (Tylenol) 500 mg tablet, Take 1 tablet (500 mg) by mouth every 6 hours if needed., Disp: , Rfl:   •  aspirin 81 mg chewable tablet, Chew 1 tablet (81 mg)., Disp: , Rfl:   •  atorvastatin (Lipitor) 20 mg tablet, Take 1 tablet (20 mg) by mouth once daily at bedtime., Disp: , Rfl:   •  blood sugar diagnostic (Blood Glucose Test) strip, 100 strips 2 times a day., Disp: 100 strip, Rfl: 11  •  BLOOD SUGAR DIAGNOSTIC MISC, by in vitro route., Disp: , Rfl:   •  blood sugar diagnostic strip, , Disp: , Rfl:   •  buPROPion XL (Wellbutrin XL) 300 mg 24 hr tablet, TAKE 1 TABLET BY MOUTH DAILY, Disp: 90 tablet, Rfl: 1  •  calcium carbonate-vitamin D3 600 mg-10 mcg (400 unit) chewable tablet, Chew 1 tablet once daily., Disp: , Rfl:   •  cyanocobalamin (Vitamin B-12) 1,000 mcg tablet, TAKE ONE TABLET BY MOUTH DAILY, Disp: 90 tablet, Rfl: 1  •  desvenlafaxine 100 mg 24 hr tablet, Take 1 tablet (100 mg) by mouth once daily., Disp: 90 tablet, Rfl: 1  •  esomeprazole (NexIUM) 40 mg DR capsule, Take 1 capsule (40 mg) by mouth once daily in the morning. Take before meals., Disp: 90 capsule, Rfl: 1  •  fluticasone (Flonase) 50 mcg/actuation nasal spray, Administer 2 sprays into each nostril once daily., Disp: , Rfl:   •  gabapentin (Neurontin) 300 mg capsule, Take 1 capsule (300 mg) by mouth 3 times a day., Disp: 90 capsule, Rfl: 5  •  glipiZIDE XL (Glucotrol XL) 10 mg 24 hr tablet, Take 1 tablet (10 mg) by mouth once daily. Do not crush, chew, or split., Disp: 30 tablet, Rfl: 11  •  hydrOXYzine pamoate (Vistaril) 25 mg capsule, Take 1 capsule (25 mg) by mouth 3 times a day as needed for anxiety., Disp: 90 capsule, Rfl: 0  •  hydrOXYzine pamoate (Vistaril) 25 mg capsule, Take 1 capsule (25 mg) by mouth 3 times a day as needed for anxiety., Disp: 30 capsule, Rfl: 3  •  lancets misc, Test twice daily, Disp: 100 each, Rfl: 11  •  loperamide (Imodium) 2 mg capsule, Take by mouth., Disp: , Rfl:   •   losartan (Cozaar) 50 mg tablet, Take 1 tablet (50 mg) by mouth once daily., Disp: 30 tablet, Rfl: 11  •  metoprolol tartrate (Lopressor) 100 mg tablet, Take 1 tablet (100 mg) by mouth 2 times a day., Disp: 60 tablet, Rfl: 5  •  nitroglycerin (Nitrostat) 0.4 mg SL tablet, Place 1 tablet (0.4 mg) under the tongue every 5 minutes if needed for chest pain., Disp: 30 tablet, Rfl: 1  •  rosuvastatin (Crestor) 5 mg tablet, TAKE ONE TABLET BY MOUTH DAILY, Disp: 90 tablet, Rfl: 1  •  traMADol (Ultram) 50 mg tablet, Take 1 tablet (50 mg) by mouth every 6 hours if needed for moderate pain (4 - 6)., Disp: 10 tablet, Rfl: 0  •  traZODone (Desyrel) 50 mg tablet, TAKE ONE TABLET BY MOUTH EVERY NIGHT AT BEDTIME, Disp: 90 tablet, Rfl: 1  [2]  Past Medical History:  Diagnosis Date   • Hyperlipidemia, unspecified 07/14/2022    Hyperlipidemia LDL goal <70   • Hypertension    • MI (myocardial infarction) (Multi) 2014   [3]  Past Surgical History:  Procedure Laterality Date   • OTHER SURGICAL HISTORY  06/02/2022    Knee surgery   • OTHER SURGICAL HISTORY  06/02/2022    Mastectomy   • OTHER SURGICAL HISTORY  06/02/2022    Knee replacement   • OTHER SURGICAL HISTORY  06/02/2022    Carpal tunnel surgery   [4]  Family History  Problem Relation Name Age of Onset   • No Known Problems Mother     • No Known Problems Father

## 2025-06-30 NOTE — PROGRESS NOTES
Subjective   Patient ID: Silva Corrigan is a 69 y.o. female who presents for No chief complaint on file..  70 yo female resident at [facility] with dementia and OA.  Resident recently had 5 teeth pulled, approximately 3 days ago.  She is currently eating soft foods.  Staff reports resident has been complaining of pain lately.  Resident sitting in room.  States most of her pain is coming from her mouth.  She realizes it will take several days for the pain to be relieved and she is taking Norco scheduled which she admits that helps with her pain.  Also reports her recliner chair is very uncomfortable but, her family paid a lot of money for the chair so she uses it.          Review of Systems   Constitutional:  Negative for fever.   Respiratory: Negative.     Cardiovascular: Negative.    Gastrointestinal: Negative.    Musculoskeletal:  Positive for arthralgias.       Objective   Physical Exam  Vitals and nursing note reviewed.   Constitutional:       General: She is not in acute distress.  HENT:      Head: Normocephalic.   Cardiovascular:      Rate and Rhythm: Normal rate and regular rhythm.      Heart sounds: Normal heart sounds.   Pulmonary:      Breath sounds: Normal breath sounds. No wheezing, rhonchi or rales.   Musculoskeletal:      Right lower leg: No edema.      Left lower leg: No edema.   Skin:     General: Skin is warm and dry.   Neurological:      Mental Status: She is alert.   Psychiatric:         Mood and Affect: Mood normal.       There were no vitals taken for this visit.    Current Medications[1]   Medical History[2]   Surgical History[3]     Family History[4]     Assessment/Plan   Problem List Items Addressed This Visit       Mouth pain - Primary   Resident is currently taking Norco bid and she feels that is working for her pain.  Cont to monitor.           [1]   Current Outpatient Medications:     acetaminophen (Tylenol) 500 mg tablet, Take 1 tablet (500 mg) by mouth every 6 hours if needed., Disp: , Rfl:      aspirin 81 mg chewable tablet, Chew 1 tablet (81 mg)., Disp: , Rfl:     atorvastatin (Lipitor) 20 mg tablet, Take 1 tablet (20 mg) by mouth once daily at bedtime., Disp: , Rfl:     blood sugar diagnostic (Blood Glucose Test) strip, 100 strips 2 times a day., Disp: 100 strip, Rfl: 11    BLOOD SUGAR DIAGNOSTIC MISC, by in vitro route., Disp: , Rfl:     blood sugar diagnostic strip, , Disp: , Rfl:     buPROPion XL (Wellbutrin XL) 300 mg 24 hr tablet, TAKE 1 TABLET BY MOUTH DAILY, Disp: 90 tablet, Rfl: 1    calcium carbonate-vitamin D3 600 mg-10 mcg (400 unit) chewable tablet, Chew 1 tablet once daily., Disp: , Rfl:     cyanocobalamin (Vitamin B-12) 1,000 mcg tablet, TAKE ONE TABLET BY MOUTH DAILY, Disp: 90 tablet, Rfl: 1    desvenlafaxine 100 mg 24 hr tablet, Take 1 tablet (100 mg) by mouth once daily., Disp: 90 tablet, Rfl: 1    esomeprazole (NexIUM) 40 mg DR capsule, Take 1 capsule (40 mg) by mouth once daily in the morning. Take before meals., Disp: 90 capsule, Rfl: 1    fluticasone (Flonase) 50 mcg/actuation nasal spray, Administer 2 sprays into each nostril once daily., Disp: , Rfl:     gabapentin (Neurontin) 300 mg capsule, Take 1 capsule (300 mg) by mouth 3 times a day., Disp: 90 capsule, Rfl: 5    glipiZIDE XL (Glucotrol XL) 10 mg 24 hr tablet, Take 1 tablet (10 mg) by mouth once daily. Do not crush, chew, or split., Disp: 30 tablet, Rfl: 11    hydrOXYzine pamoate (Vistaril) 25 mg capsule, Take 1 capsule (25 mg) by mouth 3 times a day as needed for anxiety., Disp: 90 capsule, Rfl: 0    hydrOXYzine pamoate (Vistaril) 25 mg capsule, Take 1 capsule (25 mg) by mouth 3 times a day as needed for anxiety., Disp: 30 capsule, Rfl: 3    lancets misc, Test twice daily, Disp: 100 each, Rfl: 11    loperamide (Imodium) 2 mg capsule, Take by mouth., Disp: , Rfl:     losartan (Cozaar) 50 mg tablet, Take 1 tablet (50 mg) by mouth once daily., Disp: 30 tablet, Rfl: 11    metoprolol tartrate (Lopressor) 100 mg tablet, Take 1  tablet (100 mg) by mouth 2 times a day., Disp: 60 tablet, Rfl: 5    nitroglycerin (Nitrostat) 0.4 mg SL tablet, Place 1 tablet (0.4 mg) under the tongue every 5 minutes if needed for chest pain., Disp: 30 tablet, Rfl: 1    rosuvastatin (Crestor) 5 mg tablet, TAKE ONE TABLET BY MOUTH DAILY, Disp: 90 tablet, Rfl: 1    traMADol (Ultram) 50 mg tablet, Take 1 tablet (50 mg) by mouth every 6 hours if needed for moderate pain (4 - 6)., Disp: 10 tablet, Rfl: 0    traZODone (Desyrel) 50 mg tablet, TAKE ONE TABLET BY MOUTH EVERY NIGHT AT BEDTIME, Disp: 90 tablet, Rfl: 1  [2]   Past Medical History:  Diagnosis Date    Hyperlipidemia, unspecified 07/14/2022    Hyperlipidemia LDL goal <70    Hypertension     MI (myocardial infarction) (Multi) 2014   [3]   Past Surgical History:  Procedure Laterality Date    OTHER SURGICAL HISTORY  06/02/2022    Knee surgery    OTHER SURGICAL HISTORY  06/02/2022    Mastectomy    OTHER SURGICAL HISTORY  06/02/2022    Knee replacement    OTHER SURGICAL HISTORY  06/02/2022    Carpal tunnel surgery   [4]   Family History  Problem Relation Name Age of Onset    No Known Problems Mother      No Known Problems Father

## 2025-07-23 ENCOUNTER — NURSING HOME VISIT (OUTPATIENT)
Dept: POST ACUTE CARE | Facility: EXTERNAL LOCATION | Age: 70
End: 2025-07-23
Payer: COMMERCIAL

## 2025-07-23 DIAGNOSIS — K21.9 GASTROESOPHAGEAL REFLUX DISEASE WITHOUT ESOPHAGITIS: Primary | ICD-10-CM

## 2025-07-23 PROCEDURE — 99308 SBSQ NF CARE LOW MDM 20: CPT | Performed by: INTERNAL MEDICINE

## 2025-07-23 NOTE — LETTER
Patient: Silva Corrigan  : 1955    Encounter Date: 2025    Pt was seen in the NH.  Pt c/o acid reflux reports nexium works very well for her(past) the current med does not work well  General appearance: Comfortable, no distress  ROS: No SOB  Medications reviewed  Head: Normal  Neck: Soft  Heart: Regular  Lungs: Clear  Abdomen: soft    Plan:     Dc pantoprazole start nexium 40 mg daily LUCIO    Problem List Items Addressed This Visit       GERD (gastroesophageal reflux disease) - Primary          Electronically Signed By: Chevy Ramachandran MD   25  7:25 PM

## 2025-07-23 NOTE — PROGRESS NOTES
Pt was seen in the NH.  Pt c/o acid reflux reports nexium works very well for her(past) the current med does not work well  General appearance: Comfortable, no distress  ROS: No SOB  Medications reviewed  Head: Normal  Neck: Soft  Heart: Regular  Lungs: Clear  Abdomen: soft    Plan:     Dc pantoprazole start nexium 40 mg daily LUCIO    Problem List Items Addressed This Visit       GERD (gastroesophageal reflux disease) - Primary

## 2025-08-07 ENCOUNTER — LAB REQUISITION (OUTPATIENT)
Dept: LAB | Facility: HOSPITAL | Age: 70
End: 2025-08-07
Payer: MEDICAID

## 2025-08-07 DIAGNOSIS — R30.0 DYSURIA: ICD-10-CM

## 2025-08-07 LAB
APPEARANCE UR: ABNORMAL
BACTERIA #/AREA URNS AUTO: ABNORMAL /HPF
BILIRUB UR STRIP.AUTO-MCNC: NEGATIVE MG/DL
COLOR UR: YELLOW
GLUCOSE UR STRIP.AUTO-MCNC: ABNORMAL MG/DL
KETONES UR STRIP.AUTO-MCNC: NEGATIVE MG/DL
LEUKOCYTE ESTERASE UR QL STRIP.AUTO: ABNORMAL
NITRITE UR QL STRIP.AUTO: ABNORMAL
PH UR STRIP.AUTO: 5.5 [PH]
PROT UR STRIP.AUTO-MCNC: ABNORMAL MG/DL
RBC # UR STRIP.AUTO: ABNORMAL MG/DL
RBC #/AREA URNS AUTO: ABNORMAL /HPF
SP GR UR STRIP.AUTO: 1.03
SQUAMOUS #/AREA URNS AUTO: ABNORMAL /HPF
UROBILINOGEN UR STRIP.AUTO-MCNC: NORMAL MG/DL
WBC #/AREA URNS AUTO: >50 /HPF

## 2025-08-07 PROCEDURE — 87086 URINE CULTURE/COLONY COUNT: CPT | Mod: OUT,SAMLAB | Performed by: INTERNAL MEDICINE

## 2025-08-07 PROCEDURE — 81001 URINALYSIS AUTO W/SCOPE: CPT | Mod: OUT | Performed by: INTERNAL MEDICINE

## 2025-08-09 LAB — BACTERIA UR CULT: ABNORMAL

## 2025-08-21 ENCOUNTER — NURSING HOME VISIT (OUTPATIENT)
Dept: POST ACUTE CARE | Facility: EXTERNAL LOCATION | Age: 70
End: 2025-08-21
Payer: MEDICAID

## 2025-08-21 DIAGNOSIS — M79.601 RIGHT ARM PAIN: Primary | ICD-10-CM

## 2025-08-21 DIAGNOSIS — M79.651 RIGHT THIGH PAIN: ICD-10-CM

## 2025-08-25 ENCOUNTER — LAB REQUISITION (OUTPATIENT)
Dept: LAB | Facility: HOSPITAL | Age: 70
End: 2025-08-25
Payer: COMMERCIAL

## 2025-08-25 DIAGNOSIS — I10 ESSENTIAL (PRIMARY) HYPERTENSION: ICD-10-CM

## 2025-08-25 DIAGNOSIS — E11.9 TYPE 2 DIABETES MELLITUS WITHOUT COMPLICATIONS: ICD-10-CM

## 2025-08-25 DIAGNOSIS — F41.9 ANXIETY DISORDER, UNSPECIFIED: ICD-10-CM

## 2025-08-25 LAB
BASOPHILS # BLD AUTO: 0.04 X10*3/UL (ref 0–0.1)
BASOPHILS NFR BLD AUTO: 0.7 %
EOSINOPHIL # BLD AUTO: 0.07 X10*3/UL (ref 0–0.7)
EOSINOPHIL NFR BLD AUTO: 1.2 %
ERYTHROCYTE [DISTWIDTH] IN BLOOD BY AUTOMATED COUNT: 14.2 % (ref 11.5–14.5)
ERYTHROCYTE [SEDIMENTATION RATE] IN BLOOD BY WESTERGREN METHOD: 14 MM/H (ref 0–30)
HCT VFR BLD AUTO: 43.5 % (ref 36–46)
HGB BLD-MCNC: 13.8 G/DL (ref 12–16)
IMM GRANULOCYTES # BLD AUTO: 0.01 X10*3/UL (ref 0–0.7)
IMM GRANULOCYTES NFR BLD AUTO: 0.2 % (ref 0–0.9)
LYMPHOCYTES # BLD AUTO: 2.56 X10*3/UL (ref 1.2–4.8)
LYMPHOCYTES NFR BLD AUTO: 45.4 %
MCH RBC QN AUTO: 30.5 PG (ref 26–34)
MCHC RBC AUTO-ENTMCNC: 31.7 G/DL (ref 32–36)
MCV RBC AUTO: 96 FL (ref 80–100)
MONOCYTES # BLD AUTO: 0.42 X10*3/UL (ref 0.1–1)
MONOCYTES NFR BLD AUTO: 7.4 %
NEUTROPHILS # BLD AUTO: 2.54 X10*3/UL (ref 1.2–7.7)
NEUTROPHILS NFR BLD AUTO: 45.1 %
NRBC BLD-RTO: 0 /100 WBCS (ref 0–0)
PLATELET # BLD AUTO: 222 X10*3/UL (ref 150–450)
RBC # BLD AUTO: 4.53 X10*6/UL (ref 4–5.2)
WBC # BLD AUTO: 5.6 X10*3/UL (ref 4.4–11.3)

## 2025-08-25 PROCEDURE — 85025 COMPLETE CBC W/AUTO DIFF WBC: CPT | Mod: OUT | Performed by: INTERNAL MEDICINE

## 2025-08-25 PROCEDURE — 85652 RBC SED RATE AUTOMATED: CPT | Mod: OUT | Performed by: INTERNAL MEDICINE

## 2025-08-25 PROCEDURE — 36415 COLL VENOUS BLD VENIPUNCTURE: CPT | Mod: OUT | Performed by: INTERNAL MEDICINE

## 2025-08-28 ENCOUNTER — LAB REQUISITION (OUTPATIENT)
Dept: LAB | Facility: HOSPITAL | Age: 70
End: 2025-08-28
Payer: COMMERCIAL

## 2025-08-28 DIAGNOSIS — I10 ESSENTIAL (PRIMARY) HYPERTENSION: ICD-10-CM

## 2025-09-04 ENCOUNTER — LAB REQUISITION (OUTPATIENT)
Dept: LAB | Facility: HOSPITAL | Age: 70
End: 2025-09-04
Payer: COMMERCIAL

## 2025-09-04 ENCOUNTER — NURSING HOME VISIT (OUTPATIENT)
Dept: POST ACUTE CARE | Facility: EXTERNAL LOCATION | Age: 70
End: 2025-09-04
Payer: COMMERCIAL

## 2025-09-04 DIAGNOSIS — I10 ESSENTIAL (PRIMARY) HYPERTENSION: ICD-10-CM

## 2025-09-04 DIAGNOSIS — E11.9 TYPE 2 DIABETES MELLITUS WITHOUT COMPLICATIONS: ICD-10-CM

## 2025-09-04 DIAGNOSIS — L60.0 INGROWN TOENAIL WITHOUT INFECTION: Primary | ICD-10-CM

## 2025-09-04 LAB
ALBUMIN SERPL BCP-MCNC: 3.6 G/DL (ref 3.4–5)
ALP SERPL-CCNC: 64 U/L (ref 33–136)
ALT SERPL W P-5'-P-CCNC: 21 U/L (ref 7–45)
ANION GAP SERPL CALC-SCNC: 12 MMOL/L (ref 10–20)
AST SERPL W P-5'-P-CCNC: 18 U/L (ref 9–39)
BASOPHILS # BLD AUTO: 0.06 X10*3/UL (ref 0–0.1)
BASOPHILS NFR BLD AUTO: 1.1 %
BILIRUB SERPL-MCNC: 0.3 MG/DL (ref 0–1.2)
BUN SERPL-MCNC: 10 MG/DL (ref 6–23)
CALCIUM SERPL-MCNC: 9.3 MG/DL (ref 8.6–10.3)
CHLORIDE SERPL-SCNC: 102 MMOL/L (ref 98–107)
CHOLEST SERPL-MCNC: 187 MG/DL (ref 0–199)
CHOLESTEROL/HDL RATIO: 5.7
CO2 SERPL-SCNC: 29 MMOL/L (ref 21–32)
CREAT SERPL-MCNC: 1.04 MG/DL (ref 0.5–1.05)
EGFRCR SERPLBLD CKD-EPI 2021: 58 ML/MIN/1.73M*2
EOSINOPHIL # BLD AUTO: 0.06 X10*3/UL (ref 0–0.7)
EOSINOPHIL NFR BLD AUTO: 1.1 %
ERYTHROCYTE [DISTWIDTH] IN BLOOD BY AUTOMATED COUNT: 13.6 % (ref 11.5–14.5)
EST. AVERAGE GLUCOSE BLD GHB EST-MCNC: 143 MG/DL
GLUCOSE SERPL-MCNC: 140 MG/DL (ref 74–99)
HBA1C MFR BLD: 6.6 % (ref ?–5.7)
HCT VFR BLD AUTO: 45.1 % (ref 36–46)
HDLC SERPL-MCNC: 33 MG/DL
HGB BLD-MCNC: 14.3 G/DL (ref 12–16)
IMM GRANULOCYTES # BLD AUTO: 0.01 X10*3/UL (ref 0–0.7)
IMM GRANULOCYTES NFR BLD AUTO: 0.2 % (ref 0–0.9)
LDLC SERPL CALC-MCNC: 93 MG/DL
LYMPHOCYTES # BLD AUTO: 2.66 X10*3/UL (ref 1.2–4.8)
LYMPHOCYTES NFR BLD AUTO: 47.3 %
MCH RBC QN AUTO: 30.6 PG (ref 26–34)
MCHC RBC AUTO-ENTMCNC: 31.7 G/DL (ref 32–36)
MCV RBC AUTO: 97 FL (ref 80–100)
MONOCYTES # BLD AUTO: 0.38 X10*3/UL (ref 0.1–1)
MONOCYTES NFR BLD AUTO: 6.8 %
NEUTROPHILS # BLD AUTO: 2.45 X10*3/UL (ref 1.2–7.7)
NEUTROPHILS NFR BLD AUTO: 43.5 %
NON HDL CHOLESTEROL: 154 MG/DL (ref 0–149)
NRBC BLD-RTO: 0 /100 WBCS (ref 0–0)
PLATELET # BLD AUTO: 220 X10*3/UL (ref 150–450)
POTASSIUM SERPL-SCNC: 3.4 MMOL/L (ref 3.5–5.3)
PROT SERPL-MCNC: 5.8 G/DL (ref 6.4–8.2)
RBC # BLD AUTO: 4.67 X10*6/UL (ref 4–5.2)
SODIUM SERPL-SCNC: 140 MMOL/L (ref 136–145)
TRIGL SERPL-MCNC: 305 MG/DL (ref 0–149)
VLDL: 61 MG/DL (ref 0–40)
WBC # BLD AUTO: 5.6 X10*3/UL (ref 4.4–11.3)

## 2025-09-04 PROCEDURE — 80061 LIPID PANEL: CPT | Mod: OUT | Performed by: INTERNAL MEDICINE

## 2025-09-04 PROCEDURE — 85025 COMPLETE CBC W/AUTO DIFF WBC: CPT | Mod: OUT | Performed by: INTERNAL MEDICINE

## 2025-09-04 PROCEDURE — 83036 HEMOGLOBIN GLYCOSYLATED A1C: CPT | Mod: OUT,SAMLAB | Performed by: INTERNAL MEDICINE

## 2025-09-04 PROCEDURE — 80053 COMPREHEN METABOLIC PANEL: CPT | Mod: OUT | Performed by: INTERNAL MEDICINE

## 2025-09-04 PROCEDURE — 99308 SBSQ NF CARE LOW MDM 20: CPT | Performed by: NURSE PRACTITIONER

## 2025-09-04 PROCEDURE — 36415 COLL VENOUS BLD VENIPUNCTURE: CPT | Mod: OUT | Performed by: INTERNAL MEDICINE

## 2025-09-04 ASSESSMENT — ENCOUNTER SYMPTOMS
CONSTITUTIONAL NEGATIVE: 1
RESPIRATORY NEGATIVE: 1
JOINT SWELLING: 0
CARDIOVASCULAR NEGATIVE: 1